# Patient Record
Sex: MALE
[De-identification: names, ages, dates, MRNs, and addresses within clinical notes are randomized per-mention and may not be internally consistent; named-entity substitution may affect disease eponyms.]

---

## 2023-03-01 ENCOUNTER — NURSE TRIAGE (OUTPATIENT)
Dept: OTHER | Facility: CLINIC | Age: 58
End: 2023-03-01

## 2023-03-01 NOTE — TELEPHONE ENCOUNTER
Location of patient:[place]    Subjective: Caller states \" that the last day in and a half. Took a whole course of antiobiotcs. Has had redness and swelling a day and a half ago. And it is creeping up his leg\"     Current Symptoms: leg swelling    Onset: 1 day ago; worsening    Associated Symptoms:  redness and pain     Pain Severity: 7/10; burning; constant    Temperature: 100.0 by ear thermometer    What has been tried: unknown     Recommended disposition: Go to ED Now    Care advice provided, patient verbalizes understanding; denies any other questions or concerns; instructed to call back for any new or worsening symptoms. Patient/caller agrees to proceed to nearest Emergency Department    This triage is a result of a call to [address] [place] [address]. Please do not respond to the triage nurse through this encounter. Any subsequent communication should be directly with the patient.     Reason for Disposition   [1] Red area or streak AND [2] fever    Protocols used: Leg Swelling and Edema-ADULT-

## 2023-03-04 ENCOUNTER — TELEPHONE (OUTPATIENT)
Dept: OTHER | Facility: CLINIC | Age: 58
End: 2023-03-04

## 2023-03-07 ENCOUNTER — PATIENT OUTREACH (OUTPATIENT)
Dept: CARE COORDINATION | Facility: CLINIC | Age: 58
End: 2023-03-07
Payer: COMMERCIAL

## 2023-03-17 LAB
ALANINE AMINOTRANSFERASE (SGPT) (U/L) IN SER/PLAS: 22 U/L (ref 10–52)
ALBUMIN (G/DL) IN SER/PLAS: 4.2 G/DL (ref 3.4–5)
ALKALINE PHOSPHATASE (U/L) IN SER/PLAS: 66 U/L (ref 33–120)
ANION GAP IN SER/PLAS: 17 MMOL/L (ref 10–20)
ASPARTATE AMINOTRANSFERASE (SGOT) (U/L) IN SER/PLAS: 14 U/L (ref 9–39)
BILIRUBIN TOTAL (MG/DL) IN SER/PLAS: 0.7 MG/DL (ref 0–1.2)
CALCIUM (MG/DL) IN SER/PLAS: 10.1 MG/DL (ref 8.6–10.6)
CARBON DIOXIDE, TOTAL (MMOL/L) IN SER/PLAS: 28 MMOL/L (ref 21–32)
CHLORIDE (MMOL/L) IN SER/PLAS: 104 MMOL/L (ref 98–107)
CHOLESTEROL (MG/DL) IN SER/PLAS: 137 MG/DL (ref 0–199)
CHOLESTEROL IN HDL (MG/DL) IN SER/PLAS: 52.2 MG/DL
CHOLESTEROL/HDL RATIO: 2.6
CORTISOL (UG/DL) IN SERUM - AM: 18.4 UG/DL (ref 5–20)
CREATININE (MG/DL) IN SER/PLAS: 0.93 MG/DL (ref 0.5–1.3)
DEHYDROEPIANDROSTERONE SULFATE (DHEA-S) (UG/DL) IN SER/: 161 UG/DL (ref 70–310)
ESTIMATED AVERAGE GLUCOSE FOR HBA1C: 192 MG/DL
FOLLITROPIN (IU/L) IN SER/PLAS: 7.1 IU/L
GFR MALE: >90 ML/MIN/1.73M2
GLUCOSE (MG/DL) IN SER/PLAS: 192 MG/DL (ref 74–99)
HEMOGLOBIN A1C/HEMOGLOBIN TOTAL IN BLOOD: 8.3 %
LDL: 60 MG/DL (ref 0–99)
LUTEINIZING HORMONE (IU/ML) IN SER/PLAS: 2.7 IU/L
POTASSIUM (MMOL/L) IN SER/PLAS: 4.7 MMOL/L (ref 3.5–5.3)
PROLACTIN (UG/L) IN SER/PLAS: 4.8 UG/L (ref 2–18)
PROTEIN TOTAL: 7.9 G/DL (ref 6.4–8.2)
SODIUM (MMOL/L) IN SER/PLAS: 144 MMOL/L (ref 136–145)
THYROTROPIN (MIU/L) IN SER/PLAS BY DETECTION LIMIT <= 0.05 MIU/L: 5.04 MIU/L (ref 0.44–3.98)
THYROXINE (T4) FREE (NG/DL) IN SER/PLAS: 1.23 NG/DL (ref 0.78–1.48)
TRIGLYCERIDE (MG/DL) IN SER/PLAS: 123 MG/DL (ref 0–149)
TRIIODOTHYRONINE (T3) (NG/DL) IN SER/PLAS: 125 NG/DL (ref 60–200)
UREA NITROGEN (MG/DL) IN SER/PLAS: 22 MG/DL (ref 6–23)
VLDL: 25 MG/DL (ref 0–40)

## 2023-03-20 LAB — ADRENOCORTICOTROPIC HORMONE: 37.3 PG/ML (ref 7.2–63.3)

## 2023-03-23 LAB
TESTOSTERONE FREE (CHAN): 48.2 PG/ML (ref 35–155)
TESTOSTERONE,TOTAL,LC-MS/MS: 215 NG/DL (ref 250–1100)

## 2023-07-21 LAB
ESTRADIOL (PG/ML) IN SER/PLAS: 88 PG/ML
HEMATOCRIT (%) IN BLOOD BY AUTOMATED COUNT: 53.8 % (ref 41–52)
LUTEINIZING HORMONE (IU/ML) IN SER/PLAS: <0.1 IU/L
PROSTATE SPECIFIC AG (NG/ML) IN SER/PLAS: 0.79 NG/ML (ref 0–4)
TESTOSTERONE (NG/DL) IN SER/PLAS: 733 NG/DL (ref 240–1000)

## 2023-07-22 ENCOUNTER — NURSE TRIAGE (OUTPATIENT)
Dept: OTHER | Facility: CLINIC | Age: 58
End: 2023-07-22

## 2023-07-31 ENCOUNTER — PATIENT OUTREACH (OUTPATIENT)
Dept: CARE COORDINATION | Facility: CLINIC | Age: 58
End: 2023-07-31
Payer: COMMERCIAL

## 2023-07-31 NOTE — PROGRESS NOTES
Admitted to [facility] 7/23/23 to 7/28/23 with osteomyelitis eft 2nd digit; status post amputation of such.   Discharged home on Keflex.     PMH T2DM ( 8.3 3/20/23)  HTN HLD.  Existing appt with endocrinology 9/19.   Follow up with podiatry in 1 week     Outreach to patient for transition of care completion; left voice mail message.  Enrolled in [employer] chat to monitor for 30 day transition period and will outreach if issues arise.    Gricelda VARGAS RN CCM  RN Care Coordinator  [facility]  Phone [phone]

## 2023-08-07 ENCOUNTER — PATIENT OUTREACH (OUTPATIENT)
Dept: CARE COORDINATION | Facility: CLINIC | Age: 58
End: 2023-08-07
Payer: COMMERCIAL

## 2023-08-07 NOTE — PROGRESS NOTES
Hospital provider appointment follow up.   No appointment or post hospital progress notes imaged in chart.   Attempted outreach to patient; left voice mail message. Will continue to monitor for 30 day transition period and outreach if issues arise.    Gricelda VARGAS RN CCM  RN Care Coordinator  [facility]  Phone [phone]

## 2023-09-28 PROBLEM — D22.39 MELANOCYTIC NEVI OF OTHER PARTS OF FACE: Status: ACTIVE | Noted: 2022-08-24

## 2023-09-28 PROBLEM — L72.9 INFECTED CYST OF SKIN: Status: ACTIVE | Noted: 2023-09-28

## 2023-09-28 PROBLEM — R53.83 MALAISE AND FATIGUE: Status: ACTIVE | Noted: 2023-09-28

## 2023-09-28 PROBLEM — D48.5 NEOPLASM OF UNCERTAIN BEHAVIOR OF SKIN: Status: ACTIVE | Noted: 2022-08-24

## 2023-09-28 PROBLEM — M79.89 SWELLING OF TOE OF BOTH FEET: Status: ACTIVE | Noted: 2023-09-28

## 2023-09-28 PROBLEM — E11.9 DIABETES MELLITUS (MULTI): Status: ACTIVE | Noted: 2023-09-28

## 2023-09-28 PROBLEM — D22.30 MELANOCYTIC NEVI OF UNSPECIFIED PART OF FACE: Status: ACTIVE | Noted: 2022-08-24

## 2023-09-28 PROBLEM — D22.5 MELANOCYTIC NEVI OF TRUNK: Status: ACTIVE | Noted: 2022-08-24

## 2023-09-28 PROBLEM — F90.9 ATTENTION-DEFICIT/HYPERACTIVITY DISORDER: Status: ACTIVE | Noted: 2023-09-28

## 2023-09-28 PROBLEM — D22.60 MELANOCYTIC NEVI OF UNSPECIFIED UPPER LIMB, INCLUDING SHOULDER: Status: ACTIVE | Noted: 2022-08-24

## 2023-09-28 PROBLEM — L90.5 SCAR CONDITION AND FIBROSIS OF SKIN: Status: ACTIVE | Noted: 2022-08-24

## 2023-09-28 PROBLEM — R91.8 ABNORMAL CT SCAN, LUNG: Status: ACTIVE | Noted: 2023-09-28

## 2023-09-28 PROBLEM — S91.109A WOUND, OPEN, TOE: Status: ACTIVE | Noted: 2023-09-28

## 2023-09-28 PROBLEM — E11.40 DIABETIC NEUROPATHY (MULTI): Status: ACTIVE | Noted: 2023-09-28

## 2023-09-28 PROBLEM — R07.89 CHEST DISCOMFORT: Status: ACTIVE | Noted: 2023-09-28

## 2023-09-28 PROBLEM — I10 BENIGN ESSENTIAL HYPERTENSION: Status: ACTIVE | Noted: 2023-09-28

## 2023-09-28 PROBLEM — M79.675 PAIN AROUND TOENAIL, LEFT FOOT: Status: ACTIVE | Noted: 2023-09-28

## 2023-09-28 PROBLEM — Z04.9 CONDITION NOT FOUND: Status: ACTIVE | Noted: 2023-09-28

## 2023-09-28 PROBLEM — R63.4 WEIGHT LOSS: Status: ACTIVE | Noted: 2023-09-28

## 2023-09-28 PROBLEM — N28.9 RENAL INSUFFICIENCY: Status: ACTIVE | Noted: 2023-09-28

## 2023-09-28 PROBLEM — L03.116 CELLULITIS OF LEFT LEG: Status: ACTIVE | Noted: 2023-09-28

## 2023-09-28 PROBLEM — D22.71 MELANOCYTIC NEVI OF RIGHT LOWER LIMB, INCLUDING HIP: Status: ACTIVE | Noted: 2022-08-24

## 2023-09-28 PROBLEM — R80.9 PROTEINURIA: Status: ACTIVE | Noted: 2023-09-28

## 2023-09-28 PROBLEM — M17.11 TRICOMPARTMENT OSTEOARTHRITIS OF RIGHT KNEE: Status: ACTIVE | Noted: 2023-09-28

## 2023-09-28 PROBLEM — N20.0 KIDNEY STONE ON RIGHT SIDE: Status: ACTIVE | Noted: 2023-09-28

## 2023-09-28 PROBLEM — R79.9 ABNORMAL BLOOD CHEMISTRY: Status: ACTIVE | Noted: 2023-09-28

## 2023-09-28 PROBLEM — D51.0 ANEMIA, PERNICIOUS: Status: ACTIVE | Noted: 2023-09-28

## 2023-09-28 PROBLEM — L03.90 CELLULITIS: Status: ACTIVE | Noted: 2023-09-28

## 2023-09-28 PROBLEM — D22.4 MELANOCYTIC NEVI OF SCALP AND NECK: Status: ACTIVE | Noted: 2022-08-24

## 2023-09-28 PROBLEM — M79.10 MYALGIA: Status: ACTIVE | Noted: 2023-09-28

## 2023-09-28 PROBLEM — R91.8 PULMONARY NODULES: Status: ACTIVE | Noted: 2023-09-28

## 2023-09-28 PROBLEM — D18.01 HEMANGIOMA OF SKIN AND SUBCUTANEOUS TISSUE: Status: ACTIVE | Noted: 2022-08-24

## 2023-09-28 PROBLEM — K76.9 LIVER LESION: Status: ACTIVE | Noted: 2023-09-28

## 2023-09-28 PROBLEM — E55.9 VITAMIN D DEFICIENCY: Status: ACTIVE | Noted: 2023-09-28

## 2023-09-28 PROBLEM — M54.12 RIGHT CERVICAL RADICULOPATHY: Status: ACTIVE | Noted: 2023-09-28

## 2023-09-28 PROBLEM — N52.9 ERECTILE DYSFUNCTION: Status: ACTIVE | Noted: 2023-09-28

## 2023-09-28 PROBLEM — E78.6 LOW HDL (UNDER 40): Status: ACTIVE | Noted: 2023-09-28

## 2023-09-28 PROBLEM — D64.9 ANEMIA: Status: ACTIVE | Noted: 2023-09-28

## 2023-09-28 PROBLEM — R43.0 ANOSMIA: Status: ACTIVE | Noted: 2023-09-28

## 2023-09-28 PROBLEM — B35.1 ONYCHOMYCOSIS: Status: ACTIVE | Noted: 2023-09-28

## 2023-09-28 PROBLEM — L08.9 INFECTED CYST OF SKIN: Status: ACTIVE | Noted: 2023-09-28

## 2023-09-28 PROBLEM — L08.9 TOE INFECTION: Status: ACTIVE | Noted: 2023-09-28

## 2023-09-28 PROBLEM — R53.81 MALAISE AND FATIGUE: Status: ACTIVE | Noted: 2023-09-28

## 2023-09-28 RX ORDER — METFORMIN HYDROCHLORIDE 1000 MG/1
1000 TABLET ORAL 2 TIMES DAILY
COMMUNITY
End: 2023-10-18 | Stop reason: ALTCHOICE

## 2023-09-28 RX ORDER — SULFAMETHOXAZOLE AND TRIMETHOPRIM 800; 160 MG/1; MG/1
1 TABLET ORAL EVERY 12 HOURS
COMMUNITY
Start: 2022-08-30 | End: 2024-02-16 | Stop reason: ALTCHOICE

## 2023-09-28 RX ORDER — FLASH GLUCOSE SENSOR
KIT MISCELLANEOUS
COMMUNITY
Start: 2022-12-27 | End: 2024-01-16 | Stop reason: SDUPTHER

## 2023-09-28 RX ORDER — PEN NEEDLE, DIABETIC 32GX 5/32"
NEEDLE, DISPOSABLE MISCELLANEOUS 4 TIMES DAILY
COMMUNITY
Start: 2022-12-10 | End: 2024-01-16 | Stop reason: SDUPTHER

## 2023-09-28 RX ORDER — DULAGLUTIDE 4.5 MG/.5ML
4.5 INJECTION, SOLUTION SUBCUTANEOUS
COMMUNITY
Start: 2019-02-21 | End: 2023-10-18 | Stop reason: ALTCHOICE

## 2023-09-28 RX ORDER — EMPAGLIFLOZIN 25 MG/1
TABLET, FILM COATED ORAL
COMMUNITY
Start: 2023-07-25 | End: 2024-01-16 | Stop reason: SDUPTHER

## 2023-09-28 RX ORDER — FENOFIBRATE 48 MG/1
1 TABLET, FILM COATED ORAL DAILY
COMMUNITY
Start: 2019-06-27 | End: 2023-10-17

## 2023-09-28 RX ORDER — CHLORTHALIDONE 25 MG/1
25 TABLET ORAL DAILY
COMMUNITY
Start: 2023-07-28 | End: 2024-02-16 | Stop reason: ALTCHOICE

## 2023-09-28 RX ORDER — FUROSEMIDE 40 MG/1
40 TABLET ORAL DAILY
COMMUNITY
Start: 2023-05-17

## 2023-09-28 RX ORDER — DOXYCYCLINE 100 MG/1
1 CAPSULE ORAL 2 TIMES DAILY
COMMUNITY
Start: 2023-03-03 | End: 2024-02-16 | Stop reason: ALTCHOICE

## 2023-09-28 RX ORDER — SILDENAFIL 50 MG/1
50 TABLET, FILM COATED ORAL AS NEEDED
COMMUNITY
Start: 2019-07-08 | End: 2023-10-03

## 2023-09-28 RX ORDER — INSULIN LISPRO 100 [IU]/ML
INJECTION, SOLUTION INTRAVENOUS; SUBCUTANEOUS
COMMUNITY
Start: 2021-01-15 | End: 2024-01-16 | Stop reason: SDUPTHER

## 2023-09-28 RX ORDER — BLOOD-GLUCOSE METER
EACH MISCELLANEOUS 3 TIMES DAILY
COMMUNITY
End: 2024-05-02 | Stop reason: WASHOUT

## 2023-09-28 RX ORDER — ERGOCALCIFEROL 1.25 MG/1
1 CAPSULE ORAL
COMMUNITY
End: 2023-11-17

## 2023-09-28 RX ORDER — FUROSEMIDE 20 MG/1
20 TABLET ORAL DAILY PRN
COMMUNITY
Start: 2023-03-09

## 2023-09-28 RX ORDER — INSULIN GLARGINE 300 U/ML
12 INJECTION, SOLUTION SUBCUTANEOUS NIGHTLY PRN
COMMUNITY
Start: 2022-07-15 | End: 2023-10-03

## 2023-09-28 RX ORDER — PRAVASTATIN SODIUM 40 MG/1
1 TABLET ORAL DAILY
COMMUNITY
End: 2024-01-16 | Stop reason: WASHOUT

## 2023-09-28 RX ORDER — LOSARTAN POTASSIUM 100 MG/1
1 TABLET ORAL DAILY
COMMUNITY
Start: 2019-06-27 | End: 2023-10-17

## 2023-09-28 RX ORDER — POTASSIUM CHLORIDE 1500 MG/1
20 TABLET, EXTENDED RELEASE ORAL DAILY
COMMUNITY
Start: 2023-03-09

## 2023-09-28 RX ORDER — CYANOCOBALAMIN 1000 UG/ML
2 INJECTION, SOLUTION INTRAMUSCULAR; SUBCUTANEOUS
COMMUNITY
Start: 2022-01-19

## 2023-09-28 RX ORDER — INSULIN ASPART 100 [IU]/ML
INJECTION, SOLUTION INTRAVENOUS; SUBCUTANEOUS
COMMUNITY
Start: 2023-06-04 | End: 2023-11-30

## 2023-09-28 RX ORDER — TESTOSTERONE CYPIONATE 200 MG/ML
0.4 INJECTION, SOLUTION INTRAMUSCULAR
COMMUNITY
Start: 2023-06-28 | End: 2024-02-19

## 2023-09-28 RX ORDER — AMLODIPINE BESYLATE 10 MG/1
1 TABLET ORAL DAILY
COMMUNITY
Start: 2018-10-15 | End: 2023-12-26

## 2023-09-28 RX ORDER — ACETAMINOPHEN 325 MG/1
2 TABLET ORAL EVERY 4 HOURS PRN
COMMUNITY
Start: 2022-11-10

## 2023-09-28 RX ORDER — PRAVASTATIN SODIUM 20 MG/1
1 TABLET ORAL NIGHTLY
COMMUNITY
Start: 2019-06-27 | End: 2023-10-13

## 2023-09-28 RX ORDER — NIRMATRELVIR AND RITONAVIR 300-100 MG
KIT ORAL
COMMUNITY
Start: 2023-04-11 | End: 2024-02-16 | Stop reason: ALTCHOICE

## 2023-09-28 RX ORDER — OXYCODONE HYDROCHLORIDE 5 MG/1
5 TABLET ORAL EVERY 8 HOURS PRN
COMMUNITY
Start: 2023-07-28 | End: 2024-02-16 | Stop reason: ALTCHOICE

## 2023-09-28 RX ORDER — GLIPIZIDE 5 MG/1
10 TABLET, FILM COATED, EXTENDED RELEASE ORAL 2 TIMES DAILY
COMMUNITY
Start: 2021-01-04 | End: 2023-10-18 | Stop reason: ALTCHOICE

## 2023-09-28 RX ORDER — CHOLECALCIFEROL (VITAMIN D3) 25 MCG
1 TABLET ORAL DAILY
COMMUNITY
Start: 2022-07-09

## 2023-10-02 DIAGNOSIS — N52.9 ERECTILE DISORDER: ICD-10-CM

## 2023-10-02 DIAGNOSIS — E11.59 TYPE 2 DIABETES MELLITUS WITH OTHER CIRCULATORY COMPLICATION, UNSPECIFIED WHETHER LONG TERM INSULIN USE (MULTI): Primary | ICD-10-CM

## 2023-10-02 DIAGNOSIS — E11.9 TYPE 2 DIABETES MELLITUS WITHOUT COMPLICATION, WITH LONG-TERM CURRENT USE OF INSULIN (MULTI): Primary | ICD-10-CM

## 2023-10-02 DIAGNOSIS — Z79.4 TYPE 2 DIABETES MELLITUS WITHOUT COMPLICATION, WITH LONG-TERM CURRENT USE OF INSULIN (MULTI): Primary | ICD-10-CM

## 2023-10-03 RX ORDER — SILDENAFIL 50 MG/1
TABLET, FILM COATED ORAL
Qty: 30 TABLET | Refills: 0 | Status: SHIPPED | OUTPATIENT
Start: 2023-10-03 | End: 2023-12-26

## 2023-10-03 RX ORDER — INSULIN GLARGINE 300 U/ML
INJECTION, SOLUTION SUBCUTANEOUS
Qty: 13.5 ML | Refills: 2 | Status: SHIPPED | OUTPATIENT
Start: 2023-10-03 | End: 2024-01-16 | Stop reason: SDUPTHER

## 2023-10-13 RX ORDER — PRAVASTATIN SODIUM 20 MG/1
20 TABLET ORAL NIGHTLY
Qty: 90 TABLET | Refills: 2 | Status: SHIPPED | OUTPATIENT
Start: 2023-10-13 | End: 2024-01-16 | Stop reason: SDUPTHER

## 2023-10-16 ENCOUNTER — TELEPHONE (OUTPATIENT)
Dept: ENDOCRINOLOGY | Facility: CLINIC | Age: 58
End: 2023-10-16
Payer: COMMERCIAL

## 2023-10-16 NOTE — TELEPHONE ENCOUNTER
[facility] faxed a request for renewal of 5mg of moujaro. Records show patient should be titrating to 7.5mg. I spoke with Franck and confirmed he did well with the 5mg. He will be starting the 7.5 this Sunday and already has it on hand. He will call us if there are any issues tolerating the new dosage.     Form returnred to [facility] to let know that he was titrated and 5mg is no longer needed.

## 2023-10-17 DIAGNOSIS — I10 BENIGN ESSENTIAL HYPERTENSION: Primary | ICD-10-CM

## 2023-10-17 RX ORDER — LOSARTAN POTASSIUM 100 MG/1
100 TABLET ORAL DAILY
Qty: 90 TABLET | Refills: 0 | Status: SHIPPED | OUTPATIENT
Start: 2023-10-17 | End: 2024-01-09

## 2023-10-17 RX ORDER — FENOFIBRATE 48 MG/1
48 TABLET, FILM COATED ORAL DAILY
Qty: 90 TABLET | Refills: 0 | Status: SHIPPED | OUTPATIENT
Start: 2023-10-17 | End: 2024-01-09

## 2023-10-18 ENCOUNTER — OFFICE VISIT (OUTPATIENT)
Dept: PRIMARY CARE | Facility: CLINIC | Age: 58
End: 2023-10-18
Payer: COMMERCIAL

## 2023-10-18 VITALS — DIASTOLIC BLOOD PRESSURE: 66 MMHG | BODY MASS INDEX: 38.95 KG/M2 | WEIGHT: 315 LBS | SYSTOLIC BLOOD PRESSURE: 120 MMHG

## 2023-10-18 DIAGNOSIS — I10 HYPERTENSION, UNSPECIFIED TYPE: ICD-10-CM

## 2023-10-18 DIAGNOSIS — E11.69 TYPE 2 DIABETES MELLITUS WITH OTHER SPECIFIED COMPLICATION, WITH LONG-TERM CURRENT USE OF INSULIN (MULTI): Primary | ICD-10-CM

## 2023-10-18 DIAGNOSIS — D51.0 PERNICIOUS ANEMIA: ICD-10-CM

## 2023-10-18 DIAGNOSIS — Z79.4 TYPE 2 DIABETES MELLITUS WITH OTHER SPECIFIED COMPLICATION, WITH LONG-TERM CURRENT USE OF INSULIN (MULTI): Primary | ICD-10-CM

## 2023-10-18 DIAGNOSIS — Z23 FLU VACCINE NEED: ICD-10-CM

## 2023-10-18 DIAGNOSIS — R06.02 SHORTNESS OF BREATH: ICD-10-CM

## 2023-10-18 PROCEDURE — 3078F DIAST BP <80 MM HG: CPT | Performed by: INTERNAL MEDICINE

## 2023-10-18 PROCEDURE — 4010F ACE/ARB THERAPY RXD/TAKEN: CPT | Performed by: INTERNAL MEDICINE

## 2023-10-18 PROCEDURE — 90471 IMMUNIZATION ADMIN: CPT | Performed by: INTERNAL MEDICINE

## 2023-10-18 PROCEDURE — 99214 OFFICE O/P EST MOD 30 MIN: CPT | Performed by: INTERNAL MEDICINE

## 2023-10-18 PROCEDURE — 3074F SYST BP LT 130 MM HG: CPT | Performed by: INTERNAL MEDICINE

## 2023-10-18 PROCEDURE — 90682 RIV4 VACC RECOMBINANT DNA IM: CPT | Performed by: INTERNAL MEDICINE

## 2023-10-18 PROCEDURE — 3052F HG A1C>EQUAL 8.0%<EQUAL 9.0%: CPT | Performed by: INTERNAL MEDICINE

## 2023-10-18 PROCEDURE — 3008F BODY MASS INDEX DOCD: CPT | Performed by: INTERNAL MEDICINE

## 2023-10-18 RX ORDER — CYANOCOBALAMIN 1000 UG/ML
1000 INJECTION, SOLUTION INTRAMUSCULAR; SUBCUTANEOUS ONCE
Status: DISCONTINUED | OUTPATIENT
Start: 2023-10-18 | End: 2024-05-28

## 2023-10-18 RX ORDER — OMEGA-3-ACID ETHYL ESTERS 1 G/1
1000 CAPSULE, LIQUID FILLED ORAL 2 TIMES DAILY
COMMUNITY

## 2023-10-18 RX ORDER — REGADENOSON 0.08 MG/ML
0.4 INJECTION, SOLUTION INTRAVENOUS
Status: CANCELLED | OUTPATIENT
Start: 2023-10-18

## 2023-10-18 ASSESSMENT — PATIENT HEALTH QUESTIONNAIRE - PHQ9
1. LITTLE INTEREST OR PLEASURE IN DOING THINGS: NOT AT ALL
2. FEELING DOWN, DEPRESSED OR HOPELESS: NOT AT ALL
SUM OF ALL RESPONSES TO PHQ9 QUESTIONS 1 AND 2: 0

## 2023-10-18 NOTE — PROGRESS NOTES
Subjective   Patient ID: Franck Mckinley is a 57 y.o. male who presents for FUV and Flu Vaccine.    HPI  Patient in for a visit  S/p left 2nd toe amputation to the first MTP Dr Barksdale , had ffup last one this week but can always call to see had a regular podiatrist but did not call back  Review of Systems  General: Denies fever, chills, night sweats,  Eyes: Negative for recent visual changes  Ears, Nose, Throat :  Negative for hearing changes, sinus discomfort  Dermatologic: Negative for new skin conditions, rash  Respiratory: Negative for wheezing, shortness of breath, cough  Cardiovascular: Negative for chest pain, palpitations, or leg swelling  Gastrointestinal: Negative for nausea/vomiting, abdominal pain, changes in bowel habits  Genitourinary NEGATIVE FOR URINARY INCONTINENCE urgency , frequency, discomfort   Musculoskeletal: see hpi  did mess up his exercise routine just released to restart   Neurological: Negative for headaches, dizziness    Previous history  Past Medical History:   Diagnosis Date    Abnormal finding of blood chemistry, unspecified 06/21/2017    Abnormal blood chemistry    Acute pharyngitis, unspecified 11/11/2019    Pharyngitis    Acute pharyngitis, unspecified 08/18/2020    Sore throat    Anosmia 04/19/2021    Anosmia    Calculus of kidney 01/04/2021    Kidney stone on right side    Cellulitis, unspecified 12/21/2022    Cellulitis    Chronic sinusitis, unspecified 04/26/2018    Sinusitis    Cough, unspecified 04/19/2021    Cough    Disorder of kidney and ureter, unspecified 11/14/2022    Renal insufficiency    Dorsalgia, unspecified 12/22/2020    Back pain    Edema, unspecified 11/16/2022    Edema    Encounter for screening for malignant neoplasm of colon 02/11/2020    Colon cancer screening    Encounter for screening for malignant neoplasm of prostate 01/19/2022    Screening for prostate cancer    Encounter for screening for malignant neoplasm of prostate 05/19/2020    Encounter for  prostate cancer screening    Essential (primary) hypertension 11/16/2022    Benign essential hypertension    Fever, unspecified 04/19/2021    Fever    Follicular cyst of the skin and subcutaneous tissue, unspecified 11/11/2019    Infected cyst of skin    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    Low HDL (under 40)    Liver disease, unspecified 01/27/2021    Liver lesion    Male erectile dysfunction, unspecified 02/07/2020    Erectile dysfunction    Myalgia, unspecified site 08/18/2020    Myalgia    Other chest pain 01/19/2022    Chest discomfort    Other conditions influencing health status 01/19/2022    Skin tear    Other fatigue 03/21/2019    Fatigue    Other malaise 08/18/2020    Malaise and fatigue    Other nonspecific abnormal finding of lung field 02/10/2021    Abnormal CT scan, lung    Pain in right knee 06/26/2020    Right knee pain    Pain in right shoulder 03/30/2022    Right shoulder pain    Proteinuria, unspecified 11/14/2022    Proteinuria    Proteinuria, unspecified 11/13/2015    Proteinuria    Type 2 diabetes mellitus without complications (CMS/HCC) 12/22/2022    Diabetes mellitus    Unspecified abdominal pain 05/26/2022    Abdominal pain    Unspecified open wound of unspecified toe(s) without damage to nail, initial encounter 03/16/2020    Wound, open, toe     Past Surgical History:   Procedure Laterality Date    HAND SURGERY  04/04/2014    Hand Surgery                                                                                                                                                          KNEE ARTHROPLASTY  04/04/2014    Knee Arthroplasty    OTHER SURGICAL HISTORY  06/17/2020    Colonoscopy     Social History     Tobacco Use    Smoking status: Former     Types: Cigarettes    Smokeless tobacco: Never   Substance Use Topics    Alcohol use: Not Currently     Family History   Problem Relation Name Age of Onset    Kidney cancer Mother       No Known Allergies  Current Outpatient Medications  "  Medication Instructions    acetaminophen (Tylenol) 325 mg tablet 2 tablets, oral, Every 4 hours PRN    amLODIPine (Norvasc) 10 mg tablet 1 tablet, oral, Daily    BD Romana 2nd Gen Pen Needle 32 gauge x 5/32\" needle 4 times daily    chlorthalidone (HYGROTON) 25 mg, oral, Daily    cholecalciferol (Vitamin D-3) 25 MCG (1000 UT) tablet 1 tablet, oral, Daily    cyanocobalamin (Vitamin B-12) 1,000 mcg/mL injection 2 mL, intramuscular, per AQ.    doxycycline (Vibramycin) 100 mg capsule 1 capsule, oral, 2 times daily    ergocalciferol (Vitamin D-2) 1.25 MG (46759 UT) capsule 1 capsule, oral, Weekly    fenofibrate (TRICOR) 48 mg, oral, Daily, as directed    FreeStyle Roverto 2 Sensor kit Every 14 days    furosemide (LASIX) 20 mg, oral, Daily PRN    furosemide (LASIX) 40 mg, oral, Daily, 3 DAYS FOR EDEMA THEN HOLD AND IF NEEDED    glipiZIDE XL (GLUCOTROL XL) 10 mg, oral, 2 times daily    insulin lispro (HumaLOG KwikPen Insulin) 100 unit/mL injection subcutaneous, 3 times daily with meals, INJECT up to 6 UNITS 3 TIMES A DAY WITH MEALS AND PER SLIDING SCALE 55 UNITS MAX DAILY<BR>    Jardiance 25 mg     losartan (COZAAR) 100 mg, oral, Daily    metFORMIN (GLUCOPHAGE) 1,000 mg, oral, 2 times daily    NovoLOG FlexPen U-100 Insulin 100 unit/mL (3 mL) pen     omega-3 acid ethyl esters (LOVAZA) 1,000 g, oral, 2 times daily    OneTouch Verio test strips strip 3 times daily    oxyCODONE (ROXICODONE) 5 mg, oral, Every 8 hours PRN    Paxlovid 300 mg (150 mg x 2)-100 mg tablet therapy pack PLEASE SEE ATTACHED FOR DETAILED DIRECTIONS    potassium chloride CR 20 mEq ER tablet 20 mEq, oral, Daily, WHILE ON FUROSEMIDE ONLY<BR>    pravastatin (Pravachol) 40 mg tablet 1 tablet, oral, Daily    pravastatin (PRAVACHOL) 20 mg, oral, Nightly    sildenafil (Viagra) 50 mg tablet TAKE 1 TABLET DAILY 1 HOUR BEFORE NEEDED    sulfamethoxazole-trimethoprim (Bactrim DS) 800-160 mg tablet 1 tablet, oral, Every 12 hours    testosterone cypionate " (Depo-Testosterone) 200 mg/mL injection 0.4 mL, intramuscular, Weekly    Toujeo SoloStar U-300 Insulin 300 unit/mL (1.5 mL) injection INJECT 28 UNITS SUBCUTANEOUSLY AT BEDTIME, IF BLOOD GLUCOSE IS GREATER THAN 220 INCREASE TO 32 UNITS AT BEDTIME    Trulicity 4.5 mg, subcutaneous, Weekly, on sunday       Objective       Physical Exam  Vital Signs: as recorded above  General: Well groomed, well nourished   Orientation:  Alert , oriented to time, place , and person   Mood and Affect:  Cooperative , no apparent distress normal affect  Skin: Good color, good turgor  2nd toe clean nontender   Eyes: Extra ocular muscle movements intact, anicteric sclerae  Neck: Supple, full range of movement  Chest: Normal breath sounds, normal chest wall exam, symmetric, good air entry, clear to auscultation  Heart: Regular rate and rhythm, without murmur, gallop, or rubs  BACK:  no CTLS spine tenderness, no flank tenderness  Extremities: full range of movement  bilateral UE and bilateral LE,  no lower extremity edema  Neurological: Alert, oriented, cranial nerves II-XII intact except for visual acuity  Sensation:  Intact   Gait: normal steady      Assessment/Plan   Franck Mckinley is a 57 y.o. male who presents for the concerns below:    Problem List Items Addressed This Visit    None    Pernicious anemia [id number] today for his height and weight adjusted 2 shots     FLU SHOT VO4.8  No allergies, no previous reaction or severe reaction within 6 weeks of receiving flu shot in the past , no fever, myalgia.  Receiving Flu shot today    HYPERTENSION PLAN:  , taking blood pressure medication  Follow low salt diet, exercise as discussed, weight control. Continue current medications  Eye exam in December     DIABETES MELLITUS PLAN:Stable seeing Dr Domínguez  with current medication adjustment regimen now on Mounjaro going up next week last Hba1c 8.1  Follow Diabetic diet, will consult nutrition if needed,  exercise as discussed, weight control.,  before our next visit will obtain HbA1c, BMP, urine microalbumin, ophthalmology exam.  Need Podiatry checks periodically.     SHORTNESS OF BREATH still with intermittent , he will restart stationary biking but we had planned to do a nuclear stress, cannot walk s/p aputation toe due to diabetes.  Order is in    Discussed with:   Return in :    Portions of this note were generated using digital voice recognition software, and may contain grammatical errors       Piper Alvarado MD  10/18/23  9:37 AM

## 2023-11-03 ENCOUNTER — HOSPITAL ENCOUNTER (OUTPATIENT)
Dept: RADIOLOGY | Facility: HOSPITAL | Age: 58
Discharge: HOME | End: 2023-11-03
Payer: COMMERCIAL

## 2023-11-03 ENCOUNTER — HOSPITAL ENCOUNTER (OUTPATIENT)
Dept: CARDIOLOGY | Facility: HOSPITAL | Age: 58
Discharge: HOME | End: 2023-11-03
Payer: COMMERCIAL

## 2023-11-03 DIAGNOSIS — Z79.4 TYPE 2 DIABETES MELLITUS WITH OTHER SPECIFIED COMPLICATION, WITH LONG-TERM CURRENT USE OF INSULIN (MULTI): ICD-10-CM

## 2023-11-03 DIAGNOSIS — R06.02 SHORTNESS OF BREATH: ICD-10-CM

## 2023-11-03 DIAGNOSIS — I10 HYPERTENSION, UNSPECIFIED TYPE: ICD-10-CM

## 2023-11-03 DIAGNOSIS — E11.69 TYPE 2 DIABETES MELLITUS WITH OTHER SPECIFIED COMPLICATION, WITH LONG-TERM CURRENT USE OF INSULIN (MULTI): ICD-10-CM

## 2023-11-03 PROCEDURE — 93016 CV STRESS TEST SUPVJ ONLY: CPT | Performed by: INTERNAL MEDICINE

## 2023-11-03 PROCEDURE — 3430000001 HC RX 343 DIAGNOSTIC RADIOPHARMACEUTICALS: Performed by: INTERNAL MEDICINE

## 2023-11-03 PROCEDURE — 2500000004 HC RX 250 GENERAL PHARMACY W/ HCPCS (ALT 636 FOR OP/ED): Performed by: INTERNAL MEDICINE

## 2023-11-03 PROCEDURE — 93018 CV STRESS TEST I&R ONLY: CPT | Performed by: NUCLEAR MEDICINE

## 2023-11-03 PROCEDURE — A9502 TC99M TETROFOSMIN: HCPCS | Performed by: INTERNAL MEDICINE

## 2023-11-03 PROCEDURE — 93017 CV STRESS TEST TRACING ONLY: CPT

## 2023-11-03 PROCEDURE — 78452 HT MUSCLE IMAGE SPECT MULT: CPT | Performed by: NUCLEAR MEDICINE

## 2023-11-03 PROCEDURE — 78452 HT MUSCLE IMAGE SPECT MULT: CPT

## 2023-11-03 PROCEDURE — 93016 CV STRESS TEST SUPVJ ONLY: CPT | Performed by: NUCLEAR MEDICINE

## 2023-11-03 RX ORDER — REGADENOSON 0.08 MG/ML
0.4 INJECTION, SOLUTION INTRAVENOUS ONCE
Status: COMPLETED | OUTPATIENT
Start: 2023-11-03 | End: 2023-11-03

## 2023-11-03 RX ADMIN — REGADENOSON 0.4 MG: 0.08 INJECTION, SOLUTION INTRAVENOUS at 10:27

## 2023-11-03 RX ADMIN — TETROFOSMIN 10.4 MILLICURIE: 0.23 INJECTION, POWDER, LYOPHILIZED, FOR SOLUTION INTRAVENOUS at 08:40

## 2023-11-03 RX ADMIN — TETROFOSMIN 32.5 MILLICURIE: 0.23 INJECTION, POWDER, LYOPHILIZED, FOR SOLUTION INTRAVENOUS at 10:40

## 2023-11-13 ENCOUNTER — APPOINTMENT (OUTPATIENT)
Dept: UROLOGY | Facility: HOSPITAL | Age: 58
End: 2023-11-13
Payer: COMMERCIAL

## 2023-11-16 DIAGNOSIS — E55.9 VITAMIN D DEFICIENCY: Primary | ICD-10-CM

## 2023-11-17 DIAGNOSIS — Z79.4 TYPE 2 DIABETES MELLITUS WITH HYPERGLYCEMIA, WITH LONG-TERM CURRENT USE OF INSULIN (MULTI): ICD-10-CM

## 2023-11-17 DIAGNOSIS — E55.9 VITAMIN D DEFICIENCY: Primary | ICD-10-CM

## 2023-11-17 DIAGNOSIS — E11.65 TYPE 2 DIABETES MELLITUS WITH HYPERGLYCEMIA, WITH LONG-TERM CURRENT USE OF INSULIN (MULTI): ICD-10-CM

## 2023-11-17 RX ORDER — ERGOCALCIFEROL 1.25 MG/1
1 CAPSULE ORAL
Qty: 4 CAPSULE | Refills: 2 | Status: SHIPPED | OUTPATIENT
Start: 2023-11-17 | End: 2024-02-05

## 2023-11-28 DIAGNOSIS — E11.9 TYPE 2 DIABETES MELLITUS WITHOUT COMPLICATION, WITHOUT LONG-TERM CURRENT USE OF INSULIN (MULTI): Primary | ICD-10-CM

## 2023-11-29 DIAGNOSIS — Z79.4 TYPE 2 DIABETES MELLITUS WITH HYPERGLYCEMIA, WITH LONG-TERM CURRENT USE OF INSULIN (MULTI): Primary | ICD-10-CM

## 2023-11-29 DIAGNOSIS — E11.65 TYPE 2 DIABETES MELLITUS WITH HYPERGLYCEMIA, WITH LONG-TERM CURRENT USE OF INSULIN (MULTI): Primary | ICD-10-CM

## 2023-11-29 RX ORDER — TIRZEPATIDE 7.5 MG/.5ML
7.5 INJECTION, SOLUTION SUBCUTANEOUS
Qty: 6 ML | Refills: 3 | Status: SHIPPED | OUTPATIENT
Start: 2023-11-29 | End: 2024-01-16 | Stop reason: ALTCHOICE

## 2023-11-29 NOTE — TELEPHONE ENCOUNTER
LOV: 3/15/2023- [place]  NOV: 1/16/2024  Received form from [facility] asking for 90 day refill for Mounjaro 7.5mg  Spoke to patient   Patient does need refill  Patient stated that Dr. Domínguez wrote last script.  Please see script pended below

## 2023-11-30 RX ORDER — INSULIN ASPART 100 [IU]/ML
INJECTION, SOLUTION INTRAVENOUS; SUBCUTANEOUS
Qty: 30 ML | Refills: 0 | Status: SHIPPED | OUTPATIENT
Start: 2023-11-30 | End: 2024-01-16 | Stop reason: WASHOUT

## 2023-12-01 ENCOUNTER — TELEPHONE (OUTPATIENT)
Dept: ENDOCRINOLOGY | Facility: CLINIC | Age: 58
End: 2023-12-01
Payer: COMMERCIAL

## 2023-12-12 ENCOUNTER — LAB (OUTPATIENT)
Dept: LAB | Facility: LAB | Age: 58
End: 2023-12-12
Payer: COMMERCIAL

## 2023-12-12 DIAGNOSIS — E11.65 TYPE 2 DIABETES MELLITUS WITH HYPERGLYCEMIA, WITH LONG-TERM CURRENT USE OF INSULIN (MULTI): ICD-10-CM

## 2023-12-12 DIAGNOSIS — E55.9 VITAMIN D DEFICIENCY: ICD-10-CM

## 2023-12-12 DIAGNOSIS — Z79.4 TYPE 2 DIABETES MELLITUS WITH HYPERGLYCEMIA, WITH LONG-TERM CURRENT USE OF INSULIN (MULTI): ICD-10-CM

## 2023-12-12 DIAGNOSIS — E21.1 SECONDARY HYPERPARATHYROIDISM, NOT ELSEWHERE CLASSIFIED (MULTI): Primary | ICD-10-CM

## 2023-12-12 LAB
25(OH)D3 SERPL-MCNC: 29 NG/ML (ref 30–100)
CHOLEST SERPL-MCNC: 130 MG/DL (ref 0–199)
CHOLESTEROL/HDL RATIO: 3.3
CREAT UR-MCNC: 66.3 MG/DL (ref 20–370)
EST. AVERAGE GLUCOSE BLD GHB EST-MCNC: 186 MG/DL
ESTRADIOL SERPL-MCNC: 43 PG/ML
HBA1C MFR BLD: 8.1 %
HCT VFR BLD AUTO: 59 % (ref 41–52)
HDLC SERPL-MCNC: 39.2 MG/DL
LDLC SERPL CALC-MCNC: 54 MG/DL
LH SERPL-ACNC: <0.1 IU/L
MICROALBUMIN UR-MCNC: 220.7 MG/L
MICROALBUMIN/CREAT UR: 332.9 UG/MG CREAT
NON HDL CHOLESTEROL: 91 MG/DL (ref 0–149)
PSA SERPL-MCNC: 0.78 NG/ML
TESTOST SERPL-MCNC: 368 NG/DL (ref 240–1000)
TRIGL SERPL-MCNC: 185 MG/DL (ref 0–149)
VLDL: 37 MG/DL (ref 0–40)

## 2023-12-12 PROCEDURE — 82306 VITAMIN D 25 HYDROXY: CPT

## 2023-12-12 PROCEDURE — 83002 ASSAY OF GONADOTROPIN (LH): CPT

## 2023-12-12 PROCEDURE — 80061 LIPID PANEL: CPT

## 2023-12-12 PROCEDURE — 82670 ASSAY OF TOTAL ESTRADIOL: CPT

## 2023-12-12 PROCEDURE — 84403 ASSAY OF TOTAL TESTOSTERONE: CPT

## 2023-12-12 PROCEDURE — 83036 HEMOGLOBIN GLYCOSYLATED A1C: CPT

## 2023-12-12 PROCEDURE — 82043 UR ALBUMIN QUANTITATIVE: CPT

## 2023-12-12 PROCEDURE — 82570 ASSAY OF URINE CREATININE: CPT

## 2023-12-12 PROCEDURE — 36415 COLL VENOUS BLD VENIPUNCTURE: CPT

## 2023-12-12 PROCEDURE — 84153 ASSAY OF PSA TOTAL: CPT

## 2023-12-12 PROCEDURE — 85014 HEMATOCRIT: CPT

## 2023-12-12 NOTE — PROGRESS NOTES
Patient had a visit with me on 9/19/2023.  Visit note not transferred into [facility].    Chief Complaint    follow up for diabetes      History of Present IllnessCGM INTERPRETATION:1  Average blood glucose: 199 mg/dl1 .    0 % of time worn spend below 70mg/dL1 .    41 % of time worn spent at target 70-180mg/dL1 .    59 % of time worn spent above 180mg/dL1 .   CGM data was used to influence glucose control treatment plan1 .    Minimum of 72 hours of data were reviewed1 .   FUV for type 2 diabetes. LV with Dr. Massey 03/2023. He states he was doing Toujeo 28 units BID and a dietician at [place] told him to do 56 units once daily in the morning and he sometimes takes 50 units in the morning depending on what his blood sugar is.     58 yo man with hx of type 2 diabetes, obesity class II, HTN, erectile dysfunction.    Dx: 2015  HbA1c: 8.3 (03/2023), 8.3% (12/2022)  Current regimen: Jardiance 25 mg QDAY, Toujeo 56 units QAM, Novolog 2:50 TIDAC (starting at 180)-only taking at bedtime, Trulicity 4.5 mg/week   Past medications: metformin, glipizide  Complications: diabetic foot ulcer, nephropathy  Comorbidities: HTN, obesity    SMBG: Roverto 2    Hypoglycemia: no    Diet:  Breakfast (7:30 am)-Wasa crackers with peanut butter and jelly, roasted salmon and veggies, black coffee, hard boiled eggs, olives  Lunch-typically doesn't eat lunch  Dinner (6-8 pm)-salad, roasted chicken or pork, beef or fish, roasted or steamed vegetables, occasional rice or noodles  Snacks-popcorn, fruit (cantaloup)  Drinks: water with Crystal Light packets, black coffee, one can diet pepsi per week    Exercise: currently not active given osteomyelitis in July, was previously trying to walk 1 mile per day but foot was bothering him        1 Amended By: Janene Domínguez; Sep 19 2023 2:43 PM EST    Review of Systems  General: no fever or chills  CV: no chest pain   Respiratory: no shortness of breath  MSK: no lower extremity edema  Neuro: no headache or  dizziness  See HPI for Endocrine ROS           Active Problems  Problems    · Abnormal CT scan, lung (793.19) (R91.8)   · Anemia (285.9) (D64.9)   · Anemia, pernicious (281.0) (D51.0)   · Attention-deficit/hyperactivity disorder (314.01) (F90.9)   · Back pain (724.5) (M54.9)   · Benign essential hypertension (401.1) (I10)   · Class 2 severe obesity with serious comorbidity and body mass index (BMI) of 37.0 to  37.9 in adult (278.01,V85.37) (E66.01,Z68.37)   · Colon cancer screening (V76.51) (Z12.11)   · Diabetes mellitus (250.00) (E11.9)   · Elevated hematocrit (282.7) (R71.8)   · Encounter for immunization (V03.89) (Z23)   · Encounter for prostate cancer screening (V76.44) (Z12.5)   · Erectile dysfunction (607.84) (N52.9)   · Hypogonadism male (257.2) (E29.1)   · Influenza vaccination given (V04.81) (Z23)   · Low HDL (under 40) (272.5) (E78.6)   · Onychomycosis (110.1) (B35.1)   · Pain around toenail, left foot (729.5) (M79.675)   · Proteinuria (791.0) (R80.9)   · Pulmonary nodules (793.19) (R91.8)   · Renal insufficiency (593.9) (N28.9)   · Screening for prostate cancer (V76.44) (Z12.5)   · Swelling of toe of both feet (729.81) (M79.89)   · Toe infection (686.9) (L08.9)   · Ulcer of foot due to diabetes (250.80,707.15) (E11.621,L97.509)   · Vitamin D deficiency (268.9) (E55.9)   · Weight loss (783.21) (R63.4)    Past Medical History  Problems    · Abnormal CT scan, lung (793.19) (R91.8)   · Back pain (724.5) (M54.9)   · Benign essential hypertension (401.1) (I10)   · Colon cancer screening (V76.51) (Z12.11)   · Diabetes mellitus (250.00) (E11.9)   · Encounter for prostate cancer screening (V76.44) (Z12.5)   · Erectile dysfunction (607.84) (N52.9)   · Low HDL (under 40) (272.5) (E78.6)   · Proteinuria (791.0) (R80.9)   · Renal insufficiency (593.9) (N28.9)   · Screening for prostate cancer (V76.44) (Z12.5)    Surgical History  Problems    · History of Colonoscopy   · June 4 , 2020 DR Celeste Fowler   · History of  "Hand Surgery   · History of Knee Arthroplasty    Family History  Mother    · Family history of    · just  89 yo   · Family history of renal failure (V18.69) (Z84.1)   · Family history of Kidney cancer, primary, with metastasis from kidney to other site  Father    · Family history of    · just  89 yo   · Family history of blood dyscrasia (V18.3) (Z83.2)   · now in his late 80s  Child    · Family history of No known health problems   · 15  5'9\",  13  ADHD  Daughter    · Family history of No known health problems   · 15  5'9\",  13  ADHD  Sister    · Family history of Overweight  Brother    · Family history of Overweight  Grandmother    · Family history of diabetes mellitus (V18.0) (Z83.3)  Maternal Aunt    · Family history of    · just  89 yo    Social History  Problems    · Exercise habits   · biking , walking   · Full-time employment   · [employer]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   ·    · Never smoker   · Rarely consumes alcohol (V49.89) (Z78.9)    Allergies  Medication    · No Known Drug Allergies  Recorded By: Lula Browne; 2014 8:27:29 AM    Current Meds    Medication Name Instruction   amLODIPine Besylate 10 MG Oral Tablet Take 1 tablet daily   BD Disp Needles 18G X 1-1/2\" For medication draw only   BD Pen Needle Romana 2nd Gen 32G X 4 MM uses 4 times a day   BD Pen Needle Romana U/F 32G X 4 MM Use 4 a day with insulin   Cephalexin 500 MG Oral Capsule TAKE 2 CAPSULE 3 times daily   Cyanocobalamin 1000 MCG/ML Injection Solution INJECT 1 ML INTRAMUSCULARLY ONCE A MONTH   Cyanocobalamin 1000 MCG/ML Injection Solution INJECT 1 ML INTRAMUSCULARLY ONCE A MONTH   Cyanocobalamin 1000 MCG/ML Injection Solution Inject 2mL Once Monthly   Fenofibrate 48 MG Oral Tablet take one tablet by mouth once a day as directed   Fish Oil 1000 MG Oral Capsule PATIENT TAKING 1 CAPSULE TWO TIMES DAILY   FreeStyle Roverto 2 Sensor Change sensor every 14 days as directed   Furosemide 40 MG Oral Tablet TAKE " "1 TABLET Daily for 3 days for edema then hold and prn   Gvoke HypoPen 2-Pack 1 MG/0.2ML Subcutaneous Solution Auto-injector Use for severe hypoglycemia as needed.   Jardiance 25 MG Oral Tablet TAKE 1 TABLET BY MOUTH ONCE DAILY   Losartan Potassium 100 MG Oral Tablet TAKE ONE TABLET BY MOUTH DAILY   Luer Lock Safety Syringes 22G X 1\" 3 ML use for IM injection   NovoLOG FlexPen 100 UNIT/ML Subcutaneous Solution Pen-injector INJECT PER SLIDING SCALE 30 UNITS MAX DAILY DOSAGE   OneTouch Delica Plus Worxya19D TEST 3 TIMES DAILY   OneTouch Verio In Vitro Strip TEST THREE TIMES DAILY   Potassium Chloride ER 20 MEQ Oral Tablet Extended Release Take 1 tablet daily po while on furosemide only   Pravastatin Sodium 20 MG Oral Tablet TAKE ONE TABLET BY MOUTH AT BEDTIME   Sildenafil Citrate 50 MG Oral Tablet TAKE 1 TABLET DAILY 1 HOUR BEFORE NEEDED   Testosterone Cypionate 200 MG/ML Intramuscular Solution INJECT 0.4 ML Weekly   Toujeo SoloStar 300 UNIT/ML Subcutaneous Solution Pen-injector INJECT 28 UNITS UNDER THE SKIN AT BEDTIME, IF BLOOD GLUCOSE IS >220 INCREASE TO 32 UNITS AT BEDTIME   Trulicity 4.5 MG/0.5ML Subcutaneous Solution Pen-injector Inject 4.5 mg, once a week   Vitamin D (Ergocalciferol) 1.25 MG (00382 UT) Oral Capsule TAKE 1 CAPSULE BY MOUTH ONE TIME PER WEEK   Vitamin D (Ergocalciferol) 1.25 MG (11199 UT) Oral Capsule TAKE 1 CAPSULE BY MOUTH ONE TIME PER WEEK     Vitals  Vital Signs    Recorded: [id number] 12:11PM   Temperature 97.2 F   Heart Rate 79   Respiration 16   Systolic 148   Diastolic 95   Weight 367 lb 1 oz   BMI Calculated 38.85 kg/m2   BSA Calculated 3.02   Tobacco Use b) No   Falls Screening (Age 18+) a) No falls within the last year   Pain Scale 0     Physical Exam  General: not in acute distress  Eyes: JOVAN, EOMI, no thyroid eye signs  Neck: no goiter  Neuro: alert and oriented x 3    Provider Impressions    58 yo man with hx of type 2 diabetes, obesity class II, HTN, erectile dysfunction, here for " management of diabetes.    1. Type 2 diabetes mellitus  HbA1c: 8.3 (03/2023), 8.3% (12/2022)  Current regimen: Jardiance 25 mg QDAY, Toujeo 56 units QAM, Novolog 2:50 TIDAC (starting at 180)-onsly taking at bedtime, Trulicity 4.5 mg/week   Eye exam: due, goes to a [facility]  Urine microalbumin: 52 ug/mg (01/2023) on SGLT2i since 03/2023  Podiatry: [facility], Dr. Kirill Barksdale, last seen 9/18/23  Lipids: HDL 52, LDL 60,  (03/2023) on fenofibrate    A1c: 8.1%  Roverto download reviewed. TIR 41%, no hypoglcyemia. Post-prandial hyperglycemia.   Not taking novolog before meals on a sliding scale. Only taking at bedtime.  Takes Toujeo 50 to 56 units in the morning based on blood sugars.    Discussed appropriate way to take Novolog. Will start him on scheduled prandial doses with sliding scale.  Reviewed his Roverto download with him and discussed how Novolog helps to improve post-prandial hyperglycemia by taking it before meals. He eats breakfast and dinner so will start with BIDAC novolog.    Also discussed switching Trulicity to Mounjaro. He is interested and agreeable to this.  Advised patient to notify me if he has BGs < 80.    PLAN:  -Stop Trulicity  -Start Mounjaro 5mg/week, increase to 7.5 mg/week after 4 weeks  -Continue Toujeo 56 units QAM   -Start Novolog 8 units BIDAC + SS 2:50  -continue Jardiance  -check blood sugars before every meal and bedtime  -check A1c, lipids, and urine microalbumin before next visit    Follow-up in 3 months (labs prior)      Patient Discussion/Summary    1. Start Mounjaro 5 mg, once a week. After 4 weeks, if tolerating, increase to 7.5 mg, once a week    2. Stop Trulicity    3. Continue Toujeo 56 units every morning    4. Take Novolog 8 units before breakfast and dinner (ideally about 10-15 mins before the meal). Use the sliding scale below if your pre-meal blood sugar is > 150.    <Correctional or Sliding Scale>  Only use with your meal  time insulin.    Blood Sugar Additional insulin to take  < 150 0 units   150-200 2 units   201-250 4 units   251-300 6 units   301-350 8 units   351-400 10 units   401 or greater 12 units       5. Continue Jardiance as you are       Time    Prep time on date of the patient encounter: 5 minutes.   Time spent directly with patient/family/caregiver: 30 minutes.   Documentation time: 5 minutes.   Total time on date of patient encounter: 40 minutes.      Signatures  Electronically signed by : Janene Domínguez MD; Sep 19 2023  2:43PM EST (Author)

## 2023-12-18 ENCOUNTER — OFFICE VISIT (OUTPATIENT)
Dept: UROLOGY | Facility: HOSPITAL | Age: 58
End: 2023-12-18
Payer: COMMERCIAL

## 2023-12-18 DIAGNOSIS — D75.1 POLYCYTHEMIA: ICD-10-CM

## 2023-12-18 DIAGNOSIS — N52.9 ERECTILE DYSFUNCTION, UNSPECIFIED ERECTILE DYSFUNCTION TYPE: Primary | ICD-10-CM

## 2023-12-18 DIAGNOSIS — E29.1 HYPOGONADISM IN MALE: ICD-10-CM

## 2023-12-18 DIAGNOSIS — Z12.5 PROSTATE CANCER SCREENING: ICD-10-CM

## 2023-12-18 DIAGNOSIS — R79.89 LOW TESTOSTERONE IN MALE: ICD-10-CM

## 2023-12-18 PROCEDURE — 1036F TOBACCO NON-USER: CPT | Performed by: UROLOGY

## 2023-12-18 PROCEDURE — 3060F POS MICROALBUMINURIA REV: CPT | Performed by: UROLOGY

## 2023-12-18 PROCEDURE — 4010F ACE/ARB THERAPY RXD/TAKEN: CPT | Performed by: UROLOGY

## 2023-12-18 PROCEDURE — 99214 OFFICE O/P EST MOD 30 MIN: CPT | Performed by: UROLOGY

## 2023-12-18 PROCEDURE — 3008F BODY MASS INDEX DOCD: CPT | Performed by: UROLOGY

## 2023-12-18 PROCEDURE — 3052F HG A1C>EQUAL 8.0%<EQUAL 9.0%: CPT | Performed by: UROLOGY

## 2023-12-18 PROCEDURE — 3048F LDL-C <100 MG/DL: CPT | Performed by: UROLOGY

## 2023-12-18 NOTE — PROGRESS NOTES
"HPI  58 y.o.  male patient here for concern for hypogonadism.      Last visit 08/08/23:   -Testeosterone TC.4 cc weekly, refilled today.   -injection teaching done today.   -Continue sildenafil for now.   -Patient will continue to monitor erections while on testosterone.   -PSA.   -Not interested in fertility preservation, done having children.   -fu in 3 months with low T labs.      Today's visit:   #Hypogonadism   -Currently using TC .4 cc weekly --> working well while on, has drive and energy  -noticed decline in energy when off      -Interested in fertility preservation: No.     HISTORY:   -Previous gynecomastia: none   -Personal or family history of prostate cancer: none   -Previous use of testosterone or anabolic steroids: none  -Mother had kidney cancer, grandmother had diabetes.   -No history of heart attack or stroke.     #Erectile Dysfunction  -Sildenafil 50mg --> working well  -will continue      in the 1990's, now works as  at [employer].       Lab Results   Component Value Date    TESTOSTERONE 368 12/12/2023    TESTOSTERONE 733 07/21/2023     Lab Results   Component Value Date    LH <0.1 12/12/2023     Lab Results   Component Value Date    ESTRADIOL 43 12/12/2023     Lab Results   Component Value Date    PSA 0.78 12/12/2023     Lab Results   Component Value Date    HGBA1C 8.1 (H) 12/12/2023     Lab Results   Component Value Date    HCT 59.0 (H) 12/12/2023       Current Medications:  Current Outpatient Medications   Medication Sig Dispense Refill    acetaminophen (Tylenol) 325 mg tablet Take 2 tablets (650 mg) by mouth every 4 hours if needed (pain).      amLODIPine (Norvasc) 10 mg tablet Take 1 tablet (10 mg) by mouth once daily.      BD Romana 2nd Gen Pen Needle 32 gauge x 5/32\" needle 4 times a day.      chlorthalidone (Hygroton) 25 mg tablet Take 1 tablet (25 mg) by mouth once daily.      cholecalciferol (Vitamin D-3) 25 MCG (1000 UT) tablet Take 1 tablet (25 mcg) by " mouth once daily.      cyanocobalamin (Vitamin B-12) 1,000 mcg/mL injection Inject 2 mL (2,000 mcg) into the muscle. per AQ.      doxycycline (Vibramycin) 100 mg capsule Take 1 capsule (100 mg) by mouth 2 times a day.      ergocalciferol (Vitamin D-2) 1.25 MG (88871 UT) capsule TAKE 1 CAPSULE BY MOUTH ONE TIME PER WEEK 4 capsule 2    fenofibrate (Tricor) 48 mg tablet TAKE 1 TABLET DAILY AS     DIRECTED 90 tablet 0    FreeStyle Roverto 2 Sensor kit every 14 (fourteen) days.      furosemide (Lasix) 20 mg tablet Take 1 tablet (20 mg) by mouth once daily as needed (leg edema).      furosemide (Lasix) 40 mg tablet Take 1 tablet (40 mg) by mouth once daily. 3 DAYS FOR EDEMA THEN HOLD AND IF NEEDED      insulin aspart (NovoLOG Flexpen U-100 Insulin) 100 unit/mL (3 mL) pen INJECT PER SLIDING SCALE 30 UNITS MAXIMUM DAILY DOSAGE (REPLACING HUMALOG NOT COVERED BY INSURANCE) 30 mL 0    insulin lispro (HumaLOG KwikPen Insulin) 100 unit/mL injection Inject under the skin 3 times a day with meals. INJECT up to 6 UNITS 3 TIMES A DAY WITH MEALS AND PER SLIDING SCALE 55 UNITS MAX DAILY      Jardiance 25 mg       losartan (Cozaar) 100 mg tablet TAKE 1 TABLET DAILY 90 tablet 0    omega-3 acid ethyl esters (Lovaza) 1 gram capsule Take 1,000 capsules (1,000 g) by mouth 2 times a day.      OneTouch Verio test strips strip 3 times a day.      oxyCODONE (Roxicodone) 5 mg immediate release tablet Take 1 tablet (5 mg) by mouth every 8 hours if needed for severe pain (7 - 10).      Paxlovid 300 mg (150 mg x 2)-100 mg tablet therapy pack PLEASE SEE ATTACHED FOR DETAILED DIRECTIONS      potassium chloride CR 20 mEq ER tablet Take 1 tablet (20 mEq) by mouth once daily. WHILE ON FUROSEMIDE ONLY      pravastatin (Pravachol) 20 mg tablet TAKE 1 TABLET AT BEDTIME 90 tablet 2    pravastatin (Pravachol) 40 mg tablet Take 1 tablet (40 mg) by mouth once daily.      sildenafil (Viagra) 50 mg tablet TAKE 1 TABLET DAILY 1 HOUR BEFORE NEEDED 30 tablet 0     sulfamethoxazole-trimethoprim (Bactrim DS) 800-160 mg tablet Take 1 tablet by mouth every 12 hours.      testosterone cypionate (Depo-Testosterone) 200 mg/mL injection Inject 0.4 mL (80 mg) into the muscle 1 (one) time per week.      tirzepatide (Mounjaro) 7.5 mg/0.5 mL pen injector Inject 7.5 mg under the skin every 7 days. 6 mL 3    Toujeo SoloStar U-300 Insulin 300 unit/mL (1.5 mL) injection INJECT 28 UNITS SUBCUTANEOUSLY AT BEDTIME, IF BLOOD GLUCOSE IS GREATER THAN 220 INCREASE TO 32 UNITS AT BEDTIME 13.5 mL 2     Current Facility-Administered Medications   Medication Dose Route Frequency Provider Last Rate Last Admin    cyanocobalamin (Vitamin B-12) injection 1,000 mcg  1,000 mcg intramuscular Once Piper Alvarado MD        cyanocobalamin (Vitamin B-12) injection 1,000 mcg  1,000 mcg intramuscular Once Piper Alvarado MD            PMH:  Past Medical History:   Diagnosis Date    Abnormal finding of blood chemistry, unspecified 06/21/2017    Abnormal blood chemistry    Acute pharyngitis, unspecified 11/11/2019    Pharyngitis    Acute pharyngitis, unspecified 08/18/2020    Sore throat    Anosmia 04/19/2021    Anosmia    Calculus of kidney 01/04/2021    Kidney stone on right side    Cellulitis, unspecified 12/21/2022    Cellulitis    Chronic sinusitis, unspecified 04/26/2018    Sinusitis    Cough, unspecified 04/19/2021    Cough    Disorder of kidney and ureter, unspecified 11/14/2022    Renal insufficiency    Dorsalgia, unspecified 12/22/2020    Back pain    Edema, unspecified 11/16/2022    Edema    Encounter for screening for malignant neoplasm of colon 02/11/2020    Colon cancer screening    Encounter for screening for malignant neoplasm of prostate 01/19/2022    Screening for prostate cancer    Encounter for screening for malignant neoplasm of prostate 05/19/2020    Encounter for prostate cancer screening    Essential (primary) hypertension 11/16/2022    Benign essential  hypertension    Fever, unspecified 04/19/2021    Fever    Follicular cyst of the skin and subcutaneous tissue, unspecified 11/11/2019    Infected cyst of skin    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    Low HDL (under 40)    Liver disease, unspecified 01/27/2021    Liver lesion    Male erectile dysfunction, unspecified 02/07/2020    Erectile dysfunction    Myalgia, unspecified site 08/18/2020    Myalgia    Other chest pain 01/19/2022    Chest discomfort    Other conditions influencing health status 01/19/2022    Skin tear    Other fatigue 03/21/2019    Fatigue    Other malaise 08/18/2020    Malaise and fatigue    Other nonspecific abnormal finding of lung field 02/10/2021    Abnormal CT scan, lung    Pain in right knee 06/26/2020    Right knee pain    Pain in right shoulder 03/30/2022    Right shoulder pain    Proteinuria, unspecified 11/14/2022    Proteinuria    Proteinuria, unspecified 11/13/2015    Proteinuria    Type 2 diabetes mellitus without complications (CMS/HCC) 12/22/2022    Diabetes mellitus    Unspecified abdominal pain 05/26/2022    Abdominal pain    Unspecified open wound of unspecified toe(s) without damage to nail, initial encounter 03/16/2020    Wound, open, toe       PSH:  Past Surgical History:   Procedure Laterality Date    HAND SURGERY  04/04/2014    Hand Surgery                                                                                                                                                          KNEE ARTHROPLASTY  04/04/2014    Knee Arthroplasty    OTHER SURGICAL HISTORY  06/17/2020    Colonoscopy       FMH:  Family History   Problem Relation Name Age of Onset    Kidney cancer Mother         SHx:  Social History     Tobacco Use    Smoking status: Former     Types: Cigarettes    Smokeless tobacco: Never   Substance Use Topics    Alcohol use: Not Currently       Allergies:  No Known Allergies    Physical Exam   CONSTITUTIONAL:        No acute distress    HEAD:        Normocephalic  and atraumatic    CHEST / RESPIRATORY      no excess work of breathing, no respiratory distress,    ABDOMEN / GASTROINTESTINAL:        Abdomen nondistended      Assessment/Plan  #Hypogonadism   -Testosterone TC.4 cc weekly, hold for now due to polycythemia.      #Erectile Dysfunction  -Continue sildenafil for now.   -Patient will continue to monitor erections while on testosterone.      #polycythemia       -Patient will go donate blood.      #Screening for hyperestrogenemia   -E2   -arimidex if E is elevated      #Prostate cancer screening   -PSA.      #Fertility preservation   -Not interested, done having children.      fu in 2 months with low T labs. (Repeat hct    Scribe Attestation  By signing my name below, IDanika-Demarco , Scribe   attest that this documentation has been prepared under the direction and in the presence of Paris Rosales MD.

## 2023-12-22 DIAGNOSIS — I10 BENIGN ESSENTIAL HYPERTENSION: ICD-10-CM

## 2023-12-22 DIAGNOSIS — N52.9 ERECTILE DISORDER: ICD-10-CM

## 2023-12-26 RX ORDER — SILDENAFIL 50 MG/1
TABLET, FILM COATED ORAL
Qty: 30 TABLET | Refills: 0 | Status: SHIPPED | OUTPATIENT
Start: 2023-12-26 | End: 2024-02-19

## 2023-12-26 RX ORDER — AMLODIPINE BESYLATE 10 MG/1
10 TABLET ORAL DAILY
Qty: 90 TABLET | Refills: 0 | Status: SHIPPED | OUTPATIENT
Start: 2023-12-26 | End: 2024-03-18

## 2024-01-07 DIAGNOSIS — I10 BENIGN ESSENTIAL HYPERTENSION: ICD-10-CM

## 2024-01-09 RX ORDER — FENOFIBRATE 48 MG/1
48 TABLET, FILM COATED ORAL DAILY
Qty: 90 TABLET | Refills: 2 | Status: SHIPPED | OUTPATIENT
Start: 2024-01-09

## 2024-01-09 RX ORDER — LOSARTAN POTASSIUM 100 MG/1
100 TABLET ORAL DAILY
Qty: 90 TABLET | Refills: 2 | Status: SHIPPED | OUTPATIENT
Start: 2024-01-09

## 2024-01-12 NOTE — PROGRESS NOTES
Follow up for diabetes. LV with me 09/2023 ([facility] notes did not transfer into [facility])     History of Present Illness    58 y.o. male with hx of type 2 diabetes, obesity class II, HTN, erectile dysfunction.    Dx: 2015  HbA1c: 8.1% (1/16/2024), 8.3 (03/2023), 8.3% (12/2022)  Current regimen: Jardiance 25 mg QDAY, Toujeo 56 units QAM, Novolog 8 units BIDAC + SS#2, Mounjaro 7.5 mg/week  Past medications: metformin, glipizide, Trulicity  Complications: diabetic foot ulcer, nephropathy  Comorbidities: HTN, obesity    SMBG: Roverto 2    Hypoglycemia: no    Diet:  Breakfast (7:30 am)-Wasa crackers with peanut butter and jelly, roasted salmon and veggies, black coffee, hard boiled eggs, olives  Lunch-typically doesn't eat lunch  Dinner (6-8 pm)-salad, roasted chicken or pork, beef or fish, roasted or steamed vegetables, occasional rice or noodles  Snacks-popcorn, fruit (cantaloup)  Drinks: water with Crystal Light packets, black coffee, one can diet pepsi per week    Exercise: doing band exercises     ROS  General: no fever or chills  CV: no chest pain   Respiratory: no shortness of breath  MSK: no lower extremity edema  Neuro: no headache or dizziness  See HPI for Endocrine ROS    Past Medical History:   Diagnosis Date    Abnormal finding of blood chemistry, unspecified 06/21/2017    Abnormal blood chemistry    Acute pharyngitis, unspecified 11/11/2019    Pharyngitis    Acute pharyngitis, unspecified 08/18/2020    Sore throat    Anosmia 04/19/2021    Anosmia    Calculus of kidney 01/04/2021    Kidney stone on right side    Cellulitis, unspecified 12/21/2022    Cellulitis    Chronic sinusitis, unspecified 04/26/2018    Sinusitis    Cough, unspecified 04/19/2021    Cough    Disorder of kidney and ureter, unspecified 11/14/2022    Renal insufficiency    Dorsalgia, unspecified 12/22/2020    Back pain    Edema, unspecified 11/16/2022    Edema    Encounter for screening for malignant neoplasm of colon 02/11/2020    Colon cancer  screening    Encounter for screening for malignant neoplasm of prostate 01/19/2022    Screening for prostate cancer    Encounter for screening for malignant neoplasm of prostate 05/19/2020    Encounter for prostate cancer screening    Essential (primary) hypertension 11/16/2022    Benign essential hypertension    Fever, unspecified 04/19/2021    Fever    Follicular cyst of the skin and subcutaneous tissue, unspecified 11/11/2019    Infected cyst of skin    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    Low HDL (under 40)    Liver disease, unspecified 01/27/2021    Liver lesion    Male erectile dysfunction, unspecified 02/07/2020    Erectile dysfunction    Myalgia, unspecified site 08/18/2020    Myalgia    Other chest pain 01/19/2022    Chest discomfort    Other conditions influencing health status 01/19/2022    Skin tear    Other fatigue 03/21/2019    Fatigue    Other malaise 08/18/2020    Malaise and fatigue    Other nonspecific abnormal finding of lung field 02/10/2021    Abnormal CT scan, lung    Pain in right knee 06/26/2020    Right knee pain    Pain in right shoulder 03/30/2022    Right shoulder pain    Proteinuria, unspecified 11/14/2022    Proteinuria    Proteinuria, unspecified 11/13/2015    Proteinuria    Type 2 diabetes mellitus without complications (CMS/HCC) 12/22/2022    Diabetes mellitus    Unspecified abdominal pain 05/26/2022    Abdominal pain    Unspecified open wound of unspecified toe(s) without damage to nail, initial encounter 03/16/2020    Wound, open, toe       Past Surgical History:   Procedure Laterality Date    HAND SURGERY  04/04/2014    Hand Surgery                                                                                                                                                          KNEE ARTHROPLASTY  04/04/2014    Knee Arthroplasty    OTHER SURGICAL HISTORY  06/17/2020    Colonoscopy       Social History     Socioeconomic History    Marital status:      Spouse name: Not on  "file    Number of children: Not on file    Years of education: Not on file    Highest education level: Not on file   Occupational History    Not on file   Tobacco Use    Smoking status: Former     Types: Cigarettes    Smokeless tobacco: Never   Substance and Sexual Activity    Alcohol use: Not Currently    Drug use: Not on file    Sexual activity: Not on file   Other Topics Concern    Not on file   Social History Narrative    Not on file     Social Determinants of Health     Financial Resource Strain: Not on file   Food Insecurity: Not on file   Transportation Needs: Not on file   Physical Activity: Not on file   Stress: Not on file   Social Connections: Not on file   Intimate Partner Violence: Not on file   Housing Stability: Not on file       Physical Exam   vitals were not taken for this visit.   General: not in acute distress  HEENT: JOVAN, EOMI  Thyroid: no goiter  Neuro: alert and oriented x 3    Current Outpatient Medications   Medication Sig Dispense Refill    acetaminophen (Tylenol) 325 mg tablet Take 2 tablets (650 mg) by mouth every 4 hours if needed (pain).      amLODIPine (Norvasc) 10 mg tablet TAKE 1 TABLET DAILY 90 tablet 0    BD Romana 2nd Gen Pen Needle 32 gauge x 5/32\" needle 4 times a day.      chlorthalidone (Hygroton) 25 mg tablet Take 1 tablet (25 mg) by mouth once daily.      cholecalciferol (Vitamin D-3) 25 MCG (1000 UT) tablet Take 1 tablet (25 mcg) by mouth once daily.      cyanocobalamin (Vitamin B-12) 1,000 mcg/mL injection Inject 2 mL (2,000 mcg) into the muscle. per AQ.      doxycycline (Vibramycin) 100 mg capsule Take 1 capsule (100 mg) by mouth 2 times a day.      ergocalciferol (Vitamin D-2) 1.25 MG (59371 UT) capsule TAKE 1 CAPSULE BY MOUTH ONE TIME PER WEEK 4 capsule 2    fenofibrate (Tricor) 48 mg tablet TAKE 1 TABLET DAILY AS     DIRECTED 90 tablet 2    FreeStyle Roverto 2 Sensor kit every 14 (fourteen) days.      furosemide (Lasix) 20 mg tablet Take 1 tablet (20 mg) by mouth once " daily as needed (leg edema).      furosemide (Lasix) 40 mg tablet Take 1 tablet (40 mg) by mouth once daily. 3 DAYS FOR EDEMA THEN HOLD AND IF NEEDED      insulin aspart (NovoLOG Flexpen U-100 Insulin) 100 unit/mL (3 mL) pen INJECT PER SLIDING SCALE 30 UNITS MAXIMUM DAILY DOSAGE (REPLACING HUMALOG NOT COVERED BY INSURANCE) 30 mL 0    insulin lispro (HumaLOG KwikPen Insulin) 100 unit/mL injection Inject under the skin 3 times a day with meals. INJECT up to 6 UNITS 3 TIMES A DAY WITH MEALS AND PER SLIDING SCALE 55 UNITS MAX DAILY      Jardiance 25 mg       losartan (Cozaar) 100 mg tablet TAKE 1 TABLET DAILY 90 tablet 2    omega-3 acid ethyl esters (Lovaza) 1 gram capsule Take 1,000 capsules (1,000 g) by mouth 2 times a day.      OneTouch Verio test strips strip 3 times a day.      oxyCODONE (Roxicodone) 5 mg immediate release tablet Take 1 tablet (5 mg) by mouth every 8 hours if needed for severe pain (7 - 10).      Paxlovid 300 mg (150 mg x 2)-100 mg tablet therapy pack PLEASE SEE ATTACHED FOR DETAILED DIRECTIONS      potassium chloride CR 20 mEq ER tablet Take 1 tablet (20 mEq) by mouth once daily. WHILE ON FUROSEMIDE ONLY      pravastatin (Pravachol) 20 mg tablet TAKE 1 TABLET AT BEDTIME 90 tablet 2    pravastatin (Pravachol) 40 mg tablet Take 1 tablet (40 mg) by mouth once daily.      sildenafil (Viagra) 50 mg tablet TAKE 1 TABLET DAILY 1 HOUR BEFORE NEEDED 30 tablet 0    sulfamethoxazole-trimethoprim (Bactrim DS) 800-160 mg tablet Take 1 tablet by mouth every 12 hours.      testosterone cypionate (Depo-Testosterone) 200 mg/mL injection Inject 0.4 mL (80 mg) into the muscle 1 (one) time per week.      tirzepatide (Mounjaro) 7.5 mg/0.5 mL pen injector Inject 7.5 mg under the skin every 7 days. 6 mL 3    Toujeo SoloStar U-300 Insulin 300 unit/mL (1.5 mL) injection INJECT 28 UNITS SUBCUTANEOUSLY AT BEDTIME, IF BLOOD GLUCOSE IS GREATER THAN 220 INCREASE TO 32 UNITS AT BEDTIME 13.5 mL 2     Current  Facility-Administered Medications   Medication Dose Route Frequency Provider Last Rate Last Admin    cyanocobalamin (Vitamin B-12) injection 1,000 mcg  1,000 mcg intramuscular Once Piper Alvarado MD        cyanocobalamin (Vitamin B-12) injection 1,000 mcg  1,000 mcg intramuscular Once Piper Alvarado MD               Assessment and Plan  58 y.o. male with hx of type 2 diabetes, obesity class II, HTN, erectile dysfunction, here for management of diabetes.    1. Type 2 diabetes mellitus  HbA1c: 8.1% (12/2023), 8.3 (03/2023), 8.3% (12/2022)  Current regimen: Jardiance 25 mg QDAY, Toujeo 56 units QAM, Humalog 8 units BIDAC + SS#2, Mounjaro 7.5 mg/week  Eye exam: no DR (12/2023) [facility] [place]  Urine microalbumin: 332 ug/mg (12/2023) on SGLT2i since 03/2023  Podiatry: [facility], Dr. Kirill Barksdale, last seen 9/18/23  Lipids: HDL 39, LDL 54,  (12/2023) on fenofibrate    A1c: 8.1% in December. Slowly improving.  Roverto download reviewed.   Still with post-prandial hyperglycemia to the high 200s to low 300s.  Tolerating Mounjaro. Has appetite suppression.  Taking Novolog BEFORE breakfast and dinner but only using the sliding scale. Did not know to take the 8 units.  Discussed that he should take 8 units plus the sliding scale. Will have him start this.  Refill all meds including statin [facility].    PLAN:  -increase Mounjaro to 10 mg/week  -Toujeo 56 units QAM   -start Humalog 8 units BIDAC + SS #2  -continue Jardiance  -check blood sugars before every meal and bedtime    Follow-up in 3 months

## 2024-01-16 ENCOUNTER — OFFICE VISIT (OUTPATIENT)
Dept: ENDOCRINOLOGY | Facility: CLINIC | Age: 59
End: 2024-01-16
Payer: COMMERCIAL

## 2024-01-16 VITALS
HEART RATE: 80 BPM | HEIGHT: 78 IN | BODY MASS INDEX: 36.45 KG/M2 | WEIGHT: 315 LBS | SYSTOLIC BLOOD PRESSURE: 133 MMHG | DIASTOLIC BLOOD PRESSURE: 81 MMHG

## 2024-01-16 DIAGNOSIS — E11.59 TYPE 2 DIABETES MELLITUS WITH OTHER CIRCULATORY COMPLICATION, UNSPECIFIED WHETHER LONG TERM INSULIN USE (MULTI): ICD-10-CM

## 2024-01-16 DIAGNOSIS — Z79.4 TYPE 2 DIABETES MELLITUS WITHOUT COMPLICATION, WITH LONG-TERM CURRENT USE OF INSULIN (MULTI): ICD-10-CM

## 2024-01-16 DIAGNOSIS — E11.65 TYPE 2 DIABETES MELLITUS WITH HYPERGLYCEMIA, WITH LONG-TERM CURRENT USE OF INSULIN (MULTI): Primary | ICD-10-CM

## 2024-01-16 DIAGNOSIS — Z79.4 TYPE 2 DIABETES MELLITUS WITH HYPERGLYCEMIA, WITH LONG-TERM CURRENT USE OF INSULIN (MULTI): Primary | ICD-10-CM

## 2024-01-16 DIAGNOSIS — E11.9 TYPE 2 DIABETES MELLITUS WITHOUT COMPLICATION, WITH LONG-TERM CURRENT USE OF INSULIN (MULTI): ICD-10-CM

## 2024-01-16 PROCEDURE — 99215 OFFICE O/P EST HI 40 MIN: CPT | Performed by: STUDENT IN AN ORGANIZED HEALTH CARE EDUCATION/TRAINING PROGRAM

## 2024-01-16 PROCEDURE — 1036F TOBACCO NON-USER: CPT | Performed by: STUDENT IN AN ORGANIZED HEALTH CARE EDUCATION/TRAINING PROGRAM

## 2024-01-16 PROCEDURE — 4010F ACE/ARB THERAPY RXD/TAKEN: CPT | Performed by: STUDENT IN AN ORGANIZED HEALTH CARE EDUCATION/TRAINING PROGRAM

## 2024-01-16 PROCEDURE — 95251 CONT GLUC MNTR ANALYSIS I&R: CPT | Performed by: STUDENT IN AN ORGANIZED HEALTH CARE EDUCATION/TRAINING PROGRAM

## 2024-01-16 PROCEDURE — 3079F DIAST BP 80-89 MM HG: CPT | Performed by: STUDENT IN AN ORGANIZED HEALTH CARE EDUCATION/TRAINING PROGRAM

## 2024-01-16 PROCEDURE — 3075F SYST BP GE 130 - 139MM HG: CPT | Performed by: STUDENT IN AN ORGANIZED HEALTH CARE EDUCATION/TRAINING PROGRAM

## 2024-01-16 PROCEDURE — 3008F BODY MASS INDEX DOCD: CPT | Performed by: STUDENT IN AN ORGANIZED HEALTH CARE EDUCATION/TRAINING PROGRAM

## 2024-01-16 RX ORDER — TIRZEPATIDE 10 MG/.5ML
10 INJECTION, SOLUTION SUBCUTANEOUS
Qty: 6 ML | Refills: 1 | Status: SHIPPED | OUTPATIENT
Start: 2024-01-16

## 2024-01-16 RX ORDER — INSULIN LISPRO 100 [IU]/ML
INJECTION, SOLUTION INTRAVENOUS; SUBCUTANEOUS
Qty: 45 ML | Refills: 1 | Status: SHIPPED | OUTPATIENT
Start: 2024-01-16 | End: 2024-01-18 | Stop reason: WASHOUT

## 2024-01-16 RX ORDER — FLASH GLUCOSE SENSOR
KIT MISCELLANEOUS
Qty: 6 EACH | Refills: 3 | Status: SHIPPED | OUTPATIENT
Start: 2024-01-16

## 2024-01-16 RX ORDER — INSULIN GLARGINE 300 U/ML
INJECTION, SOLUTION SUBCUTANEOUS
Qty: 18 ML | Refills: 1 | Status: SHIPPED | OUTPATIENT
Start: 2024-01-16 | End: 2024-05-02 | Stop reason: SDUPTHER

## 2024-01-16 RX ORDER — EMPAGLIFLOZIN 25 MG/1
25 TABLET, FILM COATED ORAL DAILY
Qty: 90 TABLET | Refills: 3 | Status: SHIPPED | OUTPATIENT
Start: 2024-01-16

## 2024-01-16 RX ORDER — PEN NEEDLE, DIABETIC 32GX 5/32"
NEEDLE, DISPOSABLE MISCELLANEOUS
Qty: 400 EACH | Refills: 3 | Status: SHIPPED | OUTPATIENT
Start: 2024-01-16 | End: 2024-05-02 | Stop reason: ALTCHOICE

## 2024-01-16 RX ORDER — PRAVASTATIN SODIUM 20 MG/1
20 TABLET ORAL NIGHTLY
Qty: 90 TABLET | Refills: 3 | Status: SHIPPED | OUTPATIENT
Start: 2024-01-16

## 2024-01-18 ENCOUNTER — OFFICE VISIT (OUTPATIENT)
Dept: PRIMARY CARE | Facility: CLINIC | Age: 59
End: 2024-01-18
Payer: COMMERCIAL

## 2024-01-18 ENCOUNTER — TELEPHONE (OUTPATIENT)
Dept: ENDOCRINOLOGY | Facility: CLINIC | Age: 59
End: 2024-01-18

## 2024-01-18 VITALS — DIASTOLIC BLOOD PRESSURE: 84 MMHG | SYSTOLIC BLOOD PRESSURE: 130 MMHG | WEIGHT: 315 LBS | BODY MASS INDEX: 37.56 KG/M2

## 2024-01-18 DIAGNOSIS — M54.9 BACK PAIN, UNSPECIFIED BACK LOCATION, UNSPECIFIED BACK PAIN LATERALITY, UNSPECIFIED CHRONICITY: Primary | ICD-10-CM

## 2024-01-18 DIAGNOSIS — E11.65 TYPE 2 DIABETES MELLITUS WITH HYPERGLYCEMIA, WITH LONG-TERM CURRENT USE OF INSULIN (MULTI): Primary | ICD-10-CM

## 2024-01-18 DIAGNOSIS — Z79.4 TYPE 2 DIABETES MELLITUS WITH HYPERGLYCEMIA, WITH LONG-TERM CURRENT USE OF INSULIN (MULTI): Primary | ICD-10-CM

## 2024-01-18 PROCEDURE — 3008F BODY MASS INDEX DOCD: CPT | Performed by: INTERNAL MEDICINE

## 2024-01-18 PROCEDURE — 99213 OFFICE O/P EST LOW 20 MIN: CPT | Performed by: INTERNAL MEDICINE

## 2024-01-18 PROCEDURE — 3075F SYST BP GE 130 - 139MM HG: CPT | Performed by: INTERNAL MEDICINE

## 2024-01-18 PROCEDURE — 3079F DIAST BP 80-89 MM HG: CPT | Performed by: INTERNAL MEDICINE

## 2024-01-18 PROCEDURE — 4010F ACE/ARB THERAPY RXD/TAKEN: CPT | Performed by: INTERNAL MEDICINE

## 2024-01-18 PROCEDURE — 1036F TOBACCO NON-USER: CPT | Performed by: INTERNAL MEDICINE

## 2024-01-18 RX ORDER — INSULIN ASPART 100 [IU]/ML
INJECTION, SOLUTION INTRAVENOUS; SUBCUTANEOUS
Qty: 45 ML | Refills: 1 | Status: SHIPPED | OUTPATIENT
Start: 2024-01-18

## 2024-01-18 RX ORDER — ETODOLAC 500 MG/1
500 TABLET, FILM COATED ORAL 2 TIMES DAILY
Qty: 20 TABLET | Refills: 0 | Status: SHIPPED | OUTPATIENT
Start: 2024-01-18 | End: 2024-01-28

## 2024-01-18 RX ORDER — METAXALONE 800 MG/1
800 TABLET ORAL ONCE AS NEEDED
Qty: 20 TABLET | Refills: 0 | Status: SHIPPED | OUTPATIENT
Start: 2024-01-18

## 2024-01-18 ASSESSMENT — ENCOUNTER SYMPTOMS
OCCASIONAL FEELINGS OF UNSTEADINESS: 0
DEPRESSION: 0
LOSS OF SENSATION IN FEET: 0

## 2024-01-18 ASSESSMENT — COLUMBIA-SUICIDE SEVERITY RATING SCALE - C-SSRS
6. HAVE YOU EVER DONE ANYTHING, STARTED TO DO ANYTHING, OR PREPARED TO DO ANYTHING TO END YOUR LIFE?: NO
2. HAVE YOU ACTUALLY HAD ANY THOUGHTS OF KILLING YOURSELF?: NO
1. IN THE PAST MONTH, HAVE YOU WISHED YOU WERE DEAD OR WISHED YOU COULD GO TO SLEEP AND NOT WAKE UP?: NO

## 2024-01-18 ASSESSMENT — PATIENT HEALTH QUESTIONNAIRE - PHQ9
1. LITTLE INTEREST OR PLEASURE IN DOING THINGS: NOT AT ALL
SUM OF ALL RESPONSES TO PHQ9 QUESTIONS 1 AND 2: 0
2. FEELING DOWN, DEPRESSED OR HOPELESS: NOT AT ALL

## 2024-01-18 NOTE — TELEPHONE ENCOUNTER
Notification from [employer] that insurance prefers novolog or fiasp. Please resend for a 90 day supply if you agree.

## 2024-01-18 NOTE — PROGRESS NOTES
Subjective   Patient ID: Franck Mckinley is a 58 y.o. male who presents for Back Pain (Lower left side.  Burning sensation down left leg.) and EPV.    HPI  Patient in for a visit  Two days ago tingle in right buttock then burning sensation going down the left leg   Took aleve and 1 tylenol helped a little not enough to help him sleep  No bowel or bladder control problems    Review of Systems  General: Denies fever, chills, night sweats,  Eyes: Negative for recent visual changes  Ears, Nose, Throat :  Negative for hearing changes, sinus discomfort  Dermatologic: Negative for new skin conditions, rash  Respiratory: Negative for wheezing, shortness of breath, cough  Cardiovascular: Negative for chest pain, palpitations, or leg swelling  Gastrointestinal: Negative for nausea/vomiting, abdominal pain, changes in bowel habits  Genitourinary NEGATIVE FOR URINARY INCONTINENCE urgency , frequency, discomfort   Musculoskeletal: see hpi  Neurological: Negative for headaches, dizziness    Previous history  Past Medical History:   Diagnosis Date   • Abnormal finding of blood chemistry, unspecified 06/21/2017    Abnormal blood chemistry   • Acute pharyngitis, unspecified 11/11/2019    Pharyngitis   • Acute pharyngitis, unspecified 08/18/2020    Sore throat   • Anosmia 04/19/2021    Anosmia   • Calculus of kidney 01/04/2021    Kidney stone on right side   • Cellulitis, unspecified 12/21/2022    Cellulitis   • Chronic sinusitis, unspecified 04/26/2018    Sinusitis   • Cough, unspecified 04/19/2021    Cough   • Disorder of kidney and ureter, unspecified 11/14/2022    Renal insufficiency   • Dorsalgia, unspecified 12/22/2020    Back pain   • Edema, unspecified 11/16/2022    Edema   • Encounter for screening for malignant neoplasm of colon 02/11/2020    Colon cancer screening   • Encounter for screening for malignant neoplasm of prostate 01/19/2022    Screening for prostate cancer   • Encounter for screening for malignant neoplasm of  prostate 05/19/2020    Encounter for prostate cancer screening   • Essential (primary) hypertension 11/16/2022    Benign essential hypertension   • Fever, unspecified 04/19/2021    Fever   • Follicular cyst of the skin and subcutaneous tissue, unspecified 11/11/2019    Infected cyst of skin   • 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    Low HDL (under 40)   • Liver disease, unspecified 01/27/2021    Liver lesion   • Male erectile dysfunction, unspecified 02/07/2020    Erectile dysfunction   • Myalgia, unspecified site 08/18/2020    Myalgia   • Other chest pain 01/19/2022    Chest discomfort   • Other conditions influencing health status 01/19/2022    Skin tear   • Other fatigue 03/21/2019    Fatigue   • Other malaise 08/18/2020    Malaise and fatigue   • Other nonspecific abnormal finding of lung field 02/10/2021    Abnormal CT scan, lung   • Pain in right knee 06/26/2020    Right knee pain   • Pain in right shoulder 03/30/2022    Right shoulder pain   • Proteinuria, unspecified 11/14/2022    Proteinuria   • Proteinuria, unspecified 11/13/2015    Proteinuria   • Type 2 diabetes mellitus without complications (CMS/HCC) 12/22/2022    Diabetes mellitus   • Unspecified abdominal pain 05/26/2022    Abdominal pain   • Unspecified open wound of unspecified toe(s) without damage to nail, initial encounter 03/16/2020    Wound, open, toe     Past Surgical History:   Procedure Laterality Date   • HAND SURGERY  04/04/2014    Hand Surgery                                                                                                                                                         • KNEE ARTHROPLASTY  04/04/2014    Knee Arthroplasty   • OTHER SURGICAL HISTORY  06/17/2020    Colonoscopy     Social History     Tobacco Use   • Smoking status: Former     Types: Cigarettes   • Smokeless tobacco: Never   Substance Use Topics   • Alcohol use: Not Currently   • Drug use: Never     Family History   Problem Relation Name Age of Onset   •  "Kidney cancer Mother       No Known Allergies  Current Outpatient Medications   Medication Instructions   • acetaminophen (Tylenol) 325 mg tablet 2 tablets, oral, Every 4 hours PRN   • amLODIPine (NORVASC) 10 mg, oral, Daily   • BD Romana 2nd Gen Pen Needle 32 gauge x 5/32\" needle Use to inject insulin 4 times per day.   • chlorthalidone (HYGROTON) 25 mg, oral, Daily   • cholecalciferol (Vitamin D-3) 25 MCG (1000 UT) tablet 1 tablet, oral, Daily   • cyanocobalamin (Vitamin B-12) 1,000 mcg/mL injection 2 mL, intramuscular, per AQ.   • doxycycline (Vibramycin) 100 mg capsule 1 capsule, oral, 2 times daily   • ergocalciferol (VITAMIN D-2) 1,250 mcg, oral, Weekly   • fenofibrate (TRICOR) 48 mg, oral, Daily, as directed   • FreeStyle Roverto 2 Sensor kit Change sensor every 14 days   • furosemide (LASIX) 20 mg, oral, Daily PRN   • furosemide (LASIX) 40 mg, oral, Daily, 3 DAYS FOR EDEMA THEN HOLD AND IF NEEDED   • insulin glargine (Toujeo SoloStar U-300 Insulin) 300 unit/mL (1.5 mL) injection Inject 56 units every morning.   • insulin lispro (HumaLOG KwikPen Insulin) 100 unit/mL injection Inject 8 units before every meal. Use sliding scale as directed. MDD 40 units.   • Jardiance 25 mg, oral, Daily   • losartan (COZAAR) 100 mg, oral, Daily   • Mounjaro 10 mg, subcutaneous, Weekly   • omega-3 acid ethyl esters (LOVAZA) 1,000 g, oral, 2 times daily   • OneTouch Verio test strips strip 3 times daily   • oxyCODONE (ROXICODONE) 5 mg, oral, Every 8 hours PRN   • Paxlovid 300 mg (150 mg x 2)-100 mg tablet therapy pack PLEASE SEE ATTACHED FOR DETAILED DIRECTIONS   • potassium chloride CR 20 mEq ER tablet 20 mEq, oral, Daily, WHILE ON FUROSEMIDE ONLY<BR>   • pravastatin (PRAVACHOL) 20 mg, oral, Nightly   • sildenafil (Viagra) 50 mg tablet TAKE 1 TABLET DAILY 1 HOUR BEFORE NEEDED   • sulfamethoxazole-trimethoprim (Bactrim DS) 800-160 mg tablet 1 tablet, oral, Every 12 hours   • testosterone cypionate (Depo-Testosterone) 200 mg/mL " injection 0.4 mL, intramuscular, Weekly       Objective       Physical Exam  Vital Signs: as recorded above  General: Well groomed, well nourished   Orientation:  Alert , oriented to time, place , and person   Mood and Affect:  Cooperative , no apparent distress normal affect  Skin: Good color, good turgor  Eyes: Extra ocular muscle movements intact, anicteric sclerae  Neck: Supple, full range of movement  Chest: Normal breath sounds, normal chest wall exam, symmetric, good air entry, clear to auscultation  Heart: Regular rate and rhythm, without murmur, gallop, or rubs  BACK:  no CTLS spine tenderness, no flank tenderness  Extremities: full range of movement  bilateral UE and bilateral LE,  no lower extremity edema  Neurological: Alert, oriented, cranial nerves II-XII grossly intact except for visual acuity  Sensation:  Intact   Gait: normal steady      Assessment/Plan   Franck Mckinley is a 58 y.o. male who presents for the concerns below:    Problem List Items Addressed This Visit    None    DIABETES MELLITUS PLAN:better hbaic down to 8 with current medication regimen  Follow Diabetic diet, seeing Dr Sang terry     Back Pain Plan : Rest no strenuous activity , heavy lifting    Stretching exercises    Ice compress initially followed by alternating with heat compress     NSAIDs with meals   Muscle relaxant, cautioned on sedation if with sedation just take one tablet at bedtime   If not improved will order physical therapy and order x-ray of the spine/back.    If persistent will consult with spine specialist   If with any bowel or bladder dysfunction or falling  due to weakness advised emergent evaluation.    Follow-up as planned to make sure problem has resolved.          Discussed with:   Return in :    Portions of this note were generated using digital voice recognition software, and may contain grammatical errors       Piper Alvarado MD  01/18/24  1:57 PM

## 2024-02-02 ENCOUNTER — APPOINTMENT (OUTPATIENT)
Dept: PRIMARY CARE | Facility: CLINIC | Age: 59
End: 2024-02-02
Payer: COMMERCIAL

## 2024-02-04 DIAGNOSIS — E55.9 VITAMIN D DEFICIENCY: ICD-10-CM

## 2024-02-05 RX ORDER — ERGOCALCIFEROL 1.25 MG/1
1 CAPSULE ORAL
Qty: 4 CAPSULE | Refills: 2 | Status: SHIPPED | OUTPATIENT
Start: 2024-02-05 | End: 2024-04-23

## 2024-02-16 ENCOUNTER — LAB (OUTPATIENT)
Dept: LAB | Facility: LAB | Age: 59
End: 2024-02-16
Payer: COMMERCIAL

## 2024-02-16 ENCOUNTER — OFFICE VISIT (OUTPATIENT)
Dept: PRIMARY CARE | Facility: CLINIC | Age: 59
End: 2024-02-16
Payer: COMMERCIAL

## 2024-02-16 VITALS
WEIGHT: 315 LBS | HEIGHT: 78 IN | BODY MASS INDEX: 36.45 KG/M2 | SYSTOLIC BLOOD PRESSURE: 135 MMHG | DIASTOLIC BLOOD PRESSURE: 79 MMHG

## 2024-02-16 DIAGNOSIS — Z00.00 ROUTINE GENERAL MEDICAL EXAMINATION AT A HEALTH CARE FACILITY: Primary | ICD-10-CM

## 2024-02-16 DIAGNOSIS — Z00.00 ROUTINE GENERAL MEDICAL EXAMINATION AT A HEALTH CARE FACILITY: ICD-10-CM

## 2024-02-16 DIAGNOSIS — E29.1 HYPOGONADISM IN MALE: ICD-10-CM

## 2024-02-16 DIAGNOSIS — Z12.5 PROSTATE CANCER SCREENING: ICD-10-CM

## 2024-02-16 DIAGNOSIS — E53.8 B12 DEFICIENCY: ICD-10-CM

## 2024-02-16 LAB
ERYTHROCYTE [DISTWIDTH] IN BLOOD BY AUTOMATED COUNT: 13.9 % (ref 11.5–14.5)
ESTRADIOL SERPL-MCNC: 20 PG/ML
HCT VFR BLD AUTO: 49.9 % (ref 41–52)
HGB BLD-MCNC: 16.3 G/DL (ref 13.5–17.5)
LH SERPL-ACNC: 3 IU/L
MCH RBC QN AUTO: 27.2 PG (ref 26–34)
MCHC RBC AUTO-ENTMCNC: 32.7 G/DL (ref 32–36)
MCV RBC AUTO: 83 FL (ref 80–100)
NRBC BLD-RTO: 0 /100 WBCS (ref 0–0)
PLATELET # BLD AUTO: 173 X10*3/UL (ref 150–450)
PSA SERPL-MCNC: 0.71 NG/ML
RBC # BLD AUTO: 5.99 X10*6/UL (ref 4.5–5.9)
TESTOST SERPL-MCNC: 110 NG/DL (ref 240–1000)
TSH SERPL-ACNC: 2.87 MIU/L (ref 0.44–3.98)
WBC # BLD AUTO: 9.8 X10*3/UL (ref 4.4–11.3)

## 2024-02-16 PROCEDURE — 84403 ASSAY OF TOTAL TESTOSTERONE: CPT

## 2024-02-16 PROCEDURE — 85027 COMPLETE CBC AUTOMATED: CPT

## 2024-02-16 PROCEDURE — 3078F DIAST BP <80 MM HG: CPT | Performed by: INTERNAL MEDICINE

## 2024-02-16 PROCEDURE — 82670 ASSAY OF TOTAL ESTRADIOL: CPT

## 2024-02-16 PROCEDURE — 84443 ASSAY THYROID STIM HORMONE: CPT

## 2024-02-16 PROCEDURE — 99396 PREV VISIT EST AGE 40-64: CPT | Performed by: INTERNAL MEDICINE

## 2024-02-16 PROCEDURE — 3008F BODY MASS INDEX DOCD: CPT | Performed by: INTERNAL MEDICINE

## 2024-02-16 PROCEDURE — 84153 ASSAY OF PSA TOTAL: CPT

## 2024-02-16 PROCEDURE — 36415 COLL VENOUS BLD VENIPUNCTURE: CPT

## 2024-02-16 PROCEDURE — 83002 ASSAY OF GONADOTROPIN (LH): CPT

## 2024-02-16 PROCEDURE — 4010F ACE/ARB THERAPY RXD/TAKEN: CPT | Performed by: INTERNAL MEDICINE

## 2024-02-16 PROCEDURE — 1036F TOBACCO NON-USER: CPT | Performed by: INTERNAL MEDICINE

## 2024-02-16 PROCEDURE — 3075F SYST BP GE 130 - 139MM HG: CPT | Performed by: INTERNAL MEDICINE

## 2024-02-16 ASSESSMENT — ENCOUNTER SYMPTOMS
OCCASIONAL FEELINGS OF UNSTEADINESS: 0
DEPRESSION: 0
LOSS OF SENSATION IN FEET: 0

## 2024-02-16 NOTE — PROGRESS NOTES
Subjective   Patient ID: Franck Mckinley is a 58 y.o. male who presents for Annual Exam.    HPI  Patient comes in for a physical exam last one done over a year ago , doing well over-all with no particular complaints. Also is in for laboratory review and health maintenance update.  Updating family history as well.  Interval event - past medical history, surgical, social, and family history reviewed and updated.  Interval care -  Patient is    up to date with dental care.  Patient does     receive routine vision care.    Seeing Dr Mcneal  on Monday so he is getting his blood work   Seeing endo DR Domínguez , he has freestyle christiane cgm on last hba1c 8.0   Also seeing Dr Castillo  Still on once weekly vit d    Review of Systems  General: Denies fever, chills, night sweats, changes in appetite or weight  ENT: Negative for ear pain, hearing loss, headache, difficulty swallowing, up to date with dental checks   Eyes: Negative for recent visual changes, up to date with eye exams  Dermatologic: Negative for new skin conditions, rash  Respiratory: Negative for paroxysmal nocturnal dyspnea, wheezing,shortness of breath, cough  Cardiovascular: Negative for chest pain, palpitations, or leg swelling  Gastrointestinal: Negative for nausea/vomiting, abdominal pain, changes in bowel habits  Genitourinary: Negative for dysuria, urgency, frequency  URINARY INCONTINENCE   Neurological: Negative for headaches, tremors, dizziness, memory loss, confusion, weakness, paresthesias  Psychiatric: Negative for sleep problems, anxiety, depression, conditions are stable  Endocrine: Negative for heat or cold intolerance, polyuria, polydipsia  Other:All systems have been reviewed and are negative except as previously noted.    Previous history  Past Medical History:   Diagnosis Date    Abnormal finding of blood chemistry, unspecified 06/21/2017    Abnormal blood chemistry    Acute pharyngitis, unspecified 11/11/2019    Pharyngitis    Acute  pharyngitis, unspecified 08/18/2020    Sore throat    Anosmia 04/19/2021    Anosmia    Calculus of kidney 01/04/2021    Kidney stone on right side    Cellulitis, unspecified 12/21/2022    Cellulitis    Chronic sinusitis, unspecified 04/26/2018    Sinusitis    Cough, unspecified 04/19/2021    Cough    Disorder of kidney and ureter, unspecified 11/14/2022    Renal insufficiency    Dorsalgia, unspecified 12/22/2020    Back pain    Edema, unspecified 11/16/2022    Edema    Encounter for screening for malignant neoplasm of colon 02/11/2020    Colon cancer screening    Encounter for screening for malignant neoplasm of prostate 01/19/2022    Screening for prostate cancer    Encounter for screening for malignant neoplasm of prostate 05/19/2020    Encounter for prostate cancer screening    Essential (primary) hypertension 11/16/2022    Benign essential hypertension    Fever, unspecified 04/19/2021    Fever    Follicular cyst of the skin and subcutaneous tissue, unspecified 11/11/2019    Infected cyst of skin    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    Low HDL (under 40)    Liver disease, unspecified 01/27/2021    Liver lesion    Male erectile dysfunction, unspecified 02/07/2020    Erectile dysfunction    Myalgia, unspecified site 08/18/2020    Myalgia    Other chest pain 01/19/2022    Chest discomfort    Other conditions influencing health status 01/19/2022    Skin tear    Other fatigue 03/21/2019    Fatigue    Other malaise 08/18/2020    Malaise and fatigue    Other nonspecific abnormal finding of lung field 02/10/2021    Abnormal CT scan, lung    Pain in right knee 06/26/2020    Right knee pain    Pain in right shoulder 03/30/2022    Right shoulder pain    Proteinuria, unspecified 11/14/2022    Proteinuria    Proteinuria, unspecified 11/13/2015    Proteinuria    Type 2 diabetes mellitus without complications (CMS/HCC) 12/22/2022    Diabetes mellitus    Unspecified abdominal pain 05/26/2022    Abdominal pain    Unspecified open  "wound of unspecified toe(s) without damage to nail, initial encounter 03/16/2020    Wound, open, toe     Past Surgical History:   Procedure Laterality Date    HAND SURGERY  04/04/2014    Hand Surgery                                                                                                                                                          KNEE ARTHROPLASTY  04/04/2014    Knee Arthroplasty    OTHER SURGICAL HISTORY  06/17/2020    Colonoscopy     Social History     Tobacco Use    Smoking status: Former     Types: Cigarettes    Smokeless tobacco: Never   Vaping Use    Vaping Use: Never used   Substance Use Topics    Alcohol use: Not Currently    Drug use: Never     Family History   Problem Relation Name Age of Onset    Kidney cancer Mother       No Known Allergies  Current Outpatient Medications   Medication Instructions    acetaminophen (Tylenol) 325 mg tablet 2 tablets, oral, Every 4 hours PRN    amLODIPine (NORVASC) 10 mg, oral, Daily    BD Romana 2nd Gen Pen Needle 32 gauge x 5/32\" needle Use to inject insulin 4 times per day.    cholecalciferol (Vitamin D-3) 25 MCG (1000 UT) tablet 1 tablet, oral, Daily    cyanocobalamin (Vitamin B-12) 1,000 mcg/mL injection 2 mL, intramuscular, per AQ.    ergocalciferol (VITAMIN D-2) 1,250 mcg, oral, Weekly    fenofibrate (TRICOR) 48 mg, oral, Daily, as directed    FreeStyle Roverto 2 Sensor kit Change sensor every 14 days    furosemide (LASIX) 20 mg, oral, Daily PRN    furosemide (LASIX) 40 mg, oral, Daily, 3 DAYS FOR EDEMA THEN HOLD AND IF NEEDED    insulin aspart (NovoLOG Flexpen U-100 Insulin) 100 unit/mL (3 mL) pen Inject 8 units before every meal. Use sliding scale as directed. MDD 40 units.    insulin glargine (Toujeo SoloStar U-300 Insulin) 300 unit/mL (1.5 mL) injection Inject 56 units every morning.    Jardiance 25 mg, oral, Daily    losartan (COZAAR) 100 mg, oral, Daily    metaxalone (SKELAXIN) 800 mg, oral, Once as needed    Mounjaro 10 mg, subcutaneous, " Weekly    omega-3 acid ethyl esters (LOVAZA) 1,000 g, oral, 2 times daily    OneTouch Verio test strips strip 3 times daily    potassium chloride CR 20 mEq ER tablet 20 mEq, oral, Daily, WHILE ON FUROSEMIDE ONLY<BR>    pravastatin (PRAVACHOL) 20 mg, oral, Nightly    sildenafil (Viagra) 50 mg tablet TAKE 1 TABLET DAILY 1 HOUR BEFORE NEEDED    testosterone cypionate (Depo-Testosterone) 200 mg/mL injection 0.4 mL, intramuscular, Weekly       Objective       Physical Exam  Vital Signs: as recorded above  General: Well groomed, well nourished   Orientation:  Alert , oriented to time, place , and person   Mood and Affect:  Cooperative , no apparent distress normal affect  Skin: Good color, good turgor  Eyes: Extra ocular muscle movements intact, anicteric sclerae slightly injected conjunctivae   Neck: Supple, full range of movement  Chest: Normal breath sounds, normal chest wall exam, symmetric, good air entry, clear to auscultation  Heart: Regular rate and rhythm, without murmur, gallop, or rubs  Abdomen soft nontender no masses felt no hepatosplenomegaly, no rebound or guarding  BACK:  no CTLS spine tenderness, no flank tenderness  Extremities: full range of movement  bilateral UE and bilateral LE,  no lower extremity edema  Neurological: Alert, oriented, cranial nerves II-XII intact except for visual acuity  Sensation:  Intact   Gait: normal steady      Assessment/Plan   Franck Mckinley is a 58 y.o. male who presents for the concerns below:    Problem List Items Addressed This Visit    None   BMI  improving waist is down to 42, exercising to keep muscle tone   DIABETES MELLITUS PLAN: improving with current medication regimen  Follow Diabetic diet, will consult nutrition if needed,  exercise as discussed, weight control., before our next visit will obtain HbA1c, BMP, urine microalbumin, ophthalmology exam.  Podiatry checks periodically.     HYPERTENSION PLAN:  , taking blood pressure medication  Follow low salt diet,  exercise as discussed, weight control. Continue current medications    HYPOGONADISM  he is not taking the testosterone shots for a month since his blood counts were up , he has been donating blood to help      ASSESSMENT AND PLAN: Patient on examination is in good health, will do screening blood tests to screen for high cholesterol, diabetes, thyroid, Ekg if above 50 years old or earlier if with cardiac history. Patient should be taking enough calcium in a balanced diet For Male Patients Only:  Prostate cancer screening PSA .Preventive Medicine: colon cancer screening by age 50 if no family history, balanced diet, and exercise as discussed. Seat belt use for injury prevention, living will. Substance use and /or tobacco use counseled when applicable. Alcohol use discussed, use designated . Immunizations TD age 50 and every 10 years. Pneumovax and shingles vaccine counseled. Yearly flu vaccine unless contraindicated. More than 50% of office visit time spent counseling the patient, questions were answered. If problems arise, patient is to call or come back in. It is understood that the responsibility of healthcare is shared by the patient by following a healthy lifestyle and following the plan above as discussed. Complete physical examination in a year.     Discussed with:   Return in : August     Portions of this note were generated using digital voice recognition software, and may contain grammatical errors       Piper Alvarado MD  02/16/24  10:58 AM

## 2024-02-19 ENCOUNTER — OFFICE VISIT (OUTPATIENT)
Dept: UROLOGY | Facility: HOSPITAL | Age: 59
End: 2024-02-19
Payer: COMMERCIAL

## 2024-02-19 DIAGNOSIS — N52.9 ERECTILE DYSFUNCTION, UNSPECIFIED ERECTILE DYSFUNCTION TYPE: ICD-10-CM

## 2024-02-19 DIAGNOSIS — E29.1 HYPOGONADISM IN MALE: ICD-10-CM

## 2024-02-19 DIAGNOSIS — Z71.2 ENCOUNTER TO DISCUSS TEST RESULTS: ICD-10-CM

## 2024-02-19 DIAGNOSIS — Z09 ENCOUNTER FOR FOLLOW-UP: ICD-10-CM

## 2024-02-19 DIAGNOSIS — Z12.5 PROSTATE CANCER SCREENING: ICD-10-CM

## 2024-02-19 DIAGNOSIS — R79.89 LOW TESTOSTERONE IN MALE: Primary | ICD-10-CM

## 2024-02-19 PROCEDURE — 1036F TOBACCO NON-USER: CPT | Performed by: UROLOGY

## 2024-02-19 PROCEDURE — 99214 OFFICE O/P EST MOD 30 MIN: CPT | Performed by: UROLOGY

## 2024-02-19 PROCEDURE — 3008F BODY MASS INDEX DOCD: CPT | Performed by: UROLOGY

## 2024-02-19 PROCEDURE — 4010F ACE/ARB THERAPY RXD/TAKEN: CPT | Performed by: UROLOGY

## 2024-02-19 RX ORDER — TESTOSTERONE CYPIONATE 200 MG/ML
80 INJECTION, SOLUTION INTRAMUSCULAR
Qty: 4 ML | Refills: 3 | Status: SHIPPED | OUTPATIENT
Start: 2024-02-19 | End: 2024-02-19 | Stop reason: SDUPTHER

## 2024-02-19 RX ORDER — SILDENAFIL 100 MG/1
100 TABLET, FILM COATED ORAL AS NEEDED
Qty: 30 TABLET | Refills: 6 | Status: SHIPPED | OUTPATIENT
Start: 2024-02-19 | End: 2024-03-20

## 2024-02-19 RX ORDER — TESTOSTERONE CYPIONATE 200 MG/ML
80 INJECTION, SOLUTION INTRAMUSCULAR
Qty: 4 ML | Refills: 3 | Status: SHIPPED | OUTPATIENT
Start: 2024-02-19 | End: 2024-06-10

## 2024-02-19 NOTE — PROGRESS NOTES
"HPI  58 y.o.  male patient here for concern for hypogonadism.      Last visit 12/18/23:   -Testosterone TC.4 cc weekly, hold for now due to polycythemia.   -Continue sildenafil for now.   -Patient will continue to monitor erections while on testosterone.      Today's visit:   #Hypogonadism   -Currently using TC .4 cc weekly - held due to polycythemia   -feels like he has brain fog, low libido, low energy  -went to donate blood, level now wnl  -felt good on .4cc weekly     -Interested in fertility preservation: No.     HISTORY:   -Previous gynecomastia: none   -Personal or family history of prostate cancer: none   -Previous use of testosterone or anabolic steroids: none  -Mother had kidney cancer, grandmother had diabetes.   -No history of heart attack or stroke.     #Erectile Dysfunction  -Sildenafil 50mg --> working well when taken  -has not not been interested recently   -no s/e         in the 1990's, now works as  at [employer].       Lab Results   Component Value Date    TESTOSTERONE 110 (L) 02/16/2024    TESTOSTERONE 368 12/12/2023    TESTOSTERONE 733 07/21/2023     Lab Results   Component Value Date    LH 3.0 02/16/2024     Lab Results   Component Value Date    ESTRADIOL 20 02/16/2024     Lab Results   Component Value Date    PSA 0.71 02/16/2024     Lab Results   Component Value Date    HCT 49.9 02/16/2024       Current Medications:  Current Outpatient Medications   Medication Sig Dispense Refill    acetaminophen (Tylenol) 325 mg tablet Take 2 tablets (650 mg) by mouth every 4 hours if needed (pain).      amLODIPine (Norvasc) 10 mg tablet TAKE 1 TABLET DAILY 90 tablet 0    BD Romana 2nd Gen Pen Needle 32 gauge x 5/32\" needle Use to inject insulin 4 times per day. 400 each 3    cholecalciferol (Vitamin D-3) 25 MCG (1000 UT) tablet Take 1 tablet (25 mcg) by mouth once daily.      cyanocobalamin (Vitamin B-12) 1,000 mcg/mL injection Inject 2 mL (2,000 mcg) into the muscle. per " AQ.      ergocalciferol (Vitamin D-2) 1.25 MG (41277 UT) capsule TAKE 1 CAPSULE BY MOUTH ONE TIME PER WEEK 4 capsule 2    fenofibrate (Tricor) 48 mg tablet TAKE 1 TABLET DAILY AS     DIRECTED 90 tablet 2    FreeStyle Roverto 2 Sensor kit Change sensor every 14 days 6 each 3    furosemide (Lasix) 20 mg tablet Take 1 tablet (20 mg) by mouth once daily as needed (leg edema).      furosemide (Lasix) 40 mg tablet Take 1 tablet (40 mg) by mouth once daily. 3 DAYS FOR EDEMA THEN HOLD AND IF NEEDED      insulin aspart (NovoLOG Flexpen U-100 Insulin) 100 unit/mL (3 mL) pen Inject 8 units before every meal. Use sliding scale as directed. MDD 40 units. 45 mL 1    insulin glargine (Toujeo SoloStar U-300 Insulin) 300 unit/mL (1.5 mL) injection Inject 56 units every morning. 18 mL 1    Jardiance 25 mg Take 1 tablet (25 mg) by mouth once daily. 90 tablet 3    losartan (Cozaar) 100 mg tablet TAKE 1 TABLET DAILY 90 tablet 2    metaxalone (Skelaxin) 800 mg tablet Take 1 tablet (800 mg) by mouth 1 time if needed for muscle spasms (at bedtime) for up to 20 doses. (Patient not taking: Reported on 2/16/2024) 20 tablet 0    omega-3 acid ethyl esters (Lovaza) 1 gram capsule Take 1,000 capsules (1,000 g) by mouth 2 times a day.      OneTouch Verio test strips strip 3 times a day.      potassium chloride CR 20 mEq ER tablet Take 1 tablet (20 mEq) by mouth once daily. WHILE ON FUROSEMIDE ONLY      pravastatin (Pravachol) 20 mg tablet Take 1 tablet (20 mg) by mouth once daily at bedtime. 90 tablet 3    sildenafil (Viagra) 50 mg tablet TAKE 1 TABLET DAILY 1 HOUR BEFORE NEEDED 30 tablet 0    testosterone cypionate (Depo-Testosterone) 200 mg/mL injection Inject 0.4 mL (80 mg) into the muscle 1 (one) time per week.      tirzepatide (Mounjaro) 10 mg/0.5 mL pen injector Inject 10 mg under the skin 1 (one) time per week. 6 mL 1     Current Facility-Administered Medications   Medication Dose Route Frequency Provider Last Rate Last Admin     cyanocobalamin (Vitamin B-12) injection 1,000 mcg  1,000 mcg intramuscular Once Piper Alvarado MD        cyanocobalamin (Vitamin B-12) injection 1,000 mcg  1,000 mcg intramuscular Once Piper Alvarado MD            PMH:  Past Medical History:   Diagnosis Date    Abnormal finding of blood chemistry, unspecified 06/21/2017    Abnormal blood chemistry    Acute pharyngitis, unspecified 11/11/2019    Pharyngitis    Acute pharyngitis, unspecified 08/18/2020    Sore throat    Anosmia 04/19/2021    Anosmia    Calculus of kidney 01/04/2021    Kidney stone on right side    Cellulitis, unspecified 12/21/2022    Cellulitis    Chronic sinusitis, unspecified 04/26/2018    Sinusitis    Cough, unspecified 04/19/2021    Cough    Disorder of kidney and ureter, unspecified 11/14/2022    Renal insufficiency    Dorsalgia, unspecified 12/22/2020    Back pain    Edema, unspecified 11/16/2022    Edema    Encounter for screening for malignant neoplasm of colon 02/11/2020    Colon cancer screening    Encounter for screening for malignant neoplasm of prostate 01/19/2022    Screening for prostate cancer    Encounter for screening for malignant neoplasm of prostate 05/19/2020    Encounter for prostate cancer screening    Essential (primary) hypertension 11/16/2022    Benign essential hypertension    Fever, unspecified 04/19/2021    Fever    Follicular cyst of the skin and subcutaneous tissue, unspecified 11/11/2019    Infected cyst of skin    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    Low HDL (under 40)    Liver disease, unspecified 01/27/2021    Liver lesion    Male erectile dysfunction, unspecified 02/07/2020    Erectile dysfunction    Myalgia, unspecified site 08/18/2020    Myalgia    Other chest pain 01/19/2022    Chest discomfort    Other conditions influencing health status 01/19/2022    Skin tear    Other fatigue 03/21/2019    Fatigue    Other malaise 08/18/2020    Malaise and fatigue    Other nonspecific  abnormal finding of lung field 02/10/2021    Abnormal CT scan, lung    Pain in right knee 06/26/2020    Right knee pain    Pain in right shoulder 03/30/2022    Right shoulder pain    Proteinuria, unspecified 11/14/2022    Proteinuria    Proteinuria, unspecified 11/13/2015    Proteinuria    Type 2 diabetes mellitus without complications (CMS/[facility]) 12/22/2022    Diabetes mellitus    Unspecified abdominal pain 05/26/2022    Abdominal pain    Unspecified open wound of unspecified toe(s) without damage to nail, initial encounter 03/16/2020    Wound, open, toe       PSH:  Past Surgical History:   Procedure Laterality Date    AMPUTATION Left     left 2nd toe    HAND SURGERY  04/04/2014    Hand Surgery                                                                                                                                                          KNEE ARTHROPLASTY  04/04/2014    Knee Arthroplasty    OTHER SURGICAL HISTORY  06/17/2020    Colonoscopy       FMH:  Family History   Problem Relation Name Age of Onset    Kidney cancer Mother      No Known Problems Daughter      No Known Problems Daughter         SHx:  Social History     Tobacco Use    Smoking status: Former     Types: Cigarettes    Smokeless tobacco: Never   Vaping Use    Vaping Use: Never used   Substance Use Topics    Alcohol use: Not Currently    Drug use: Never       Allergies:  No Known Allergies    Physical Exam   CONSTITUTIONAL:        No acute distress    HEAD:        Normocephalic and atraumatic    CHEST / RESPIRATORY      no excess work of breathing, no respiratory distress,    ABDOMEN / GASTROINTESTINAL:        Abdomen nondistended      Assessment/Plan  #Hypogonadism   -Re-start Testosterone TC.4 cc weekly     #Erectile Dysfunction  -Continue sildenafil, refilled today.      #polycythemia       -Patient will go donate blood once a month.     #Screening for hyperestrogenemia   -E2   -arimidex if E is elevated      #Prostate cancer screening   -PSA.       #Fertility preservation   -Not interested, done having children.      fu in 3 months with low T labs.     Scribe Attestation  By signing my name below, I, Danika Serna-Zaida Pal   attest that this documentation has been prepared under the direction and in the presence of Paris Rosales MD.

## 2024-02-21 RX ORDER — CYANOCOBALAMIN 1000 UG/ML
1000 INJECTION, SOLUTION INTRAMUSCULAR; SUBCUTANEOUS ONCE
Status: DISCONTINUED | OUTPATIENT
Start: 2024-02-21 | End: 2024-05-28

## 2024-02-28 ENCOUNTER — APPOINTMENT (OUTPATIENT)
Dept: DERMATOLOGY | Facility: CLINIC | Age: 59
End: 2024-02-28
Payer: COMMERCIAL

## 2024-03-17 DIAGNOSIS — I10 BENIGN ESSENTIAL HYPERTENSION: ICD-10-CM

## 2024-03-18 RX ORDER — AMLODIPINE BESYLATE 10 MG/1
10 TABLET ORAL DAILY
Qty: 90 TABLET | Refills: 1 | Status: SHIPPED | OUTPATIENT
Start: 2024-03-18

## 2024-03-22 ENCOUNTER — CLINICAL SUPPORT (OUTPATIENT)
Dept: PRIMARY CARE | Facility: CLINIC | Age: 59
End: 2024-03-22
Payer: COMMERCIAL

## 2024-03-22 DIAGNOSIS — E55.9 VITAMIN D DEFICIENCY: ICD-10-CM

## 2024-03-22 DIAGNOSIS — E53.8 VITAMIN B12 DEFICIENCY: Primary | ICD-10-CM

## 2024-03-22 PROCEDURE — 96372 THER/PROPH/DIAG INJ SC/IM: CPT | Performed by: INTERNAL MEDICINE

## 2024-03-22 RX ORDER — CYANOCOBALAMIN 1000 UG/ML
1000 INJECTION, SOLUTION INTRAMUSCULAR; SUBCUTANEOUS ONCE
Status: COMPLETED | OUTPATIENT
Start: 2024-03-22 | End: 2024-03-22

## 2024-03-22 RX ORDER — CYANOCOBALAMIN 1000 UG/ML
1000 INJECTION, SOLUTION INTRAMUSCULAR; SUBCUTANEOUS ONCE
Status: CANCELLED | OUTPATIENT
Start: 2024-03-22 | End: 2024-03-22

## 2024-03-22 RX ADMIN — CYANOCOBALAMIN 1000 MCG: 1000 INJECTION, SOLUTION INTRAMUSCULAR; SUBCUTANEOUS at 10:11

## 2024-04-02 ENCOUNTER — TELEPHONE (OUTPATIENT)
Dept: ENDOCRINOLOGY | Facility: CLINIC | Age: 59
End: 2024-04-02
Payer: COMMERCIAL

## 2024-04-02 DIAGNOSIS — Z79.4 TYPE 2 DIABETES MELLITUS WITH HYPERGLYCEMIA, WITH LONG-TERM CURRENT USE OF INSULIN (MULTI): Primary | ICD-10-CM

## 2024-04-02 DIAGNOSIS — E11.65 TYPE 2 DIABETES MELLITUS WITH HYPERGLYCEMIA, WITH LONG-TERM CURRENT USE OF INSULIN (MULTI): Primary | ICD-10-CM

## 2024-04-02 RX ORDER — SEMAGLUTIDE 1.34 MG/ML
1 INJECTION, SOLUTION SUBCUTANEOUS
Qty: 3 ML | Refills: 2 | Status: SHIPPED | OUTPATIENT
Start: 2024-04-02

## 2024-04-02 NOTE — TELEPHONE ENCOUNTER
Notice from [facility] that they do not have Mounjaro 10mg, 12.5mg or 15mg in stock. They are asking for an alternative to be Rxd

## 2024-04-23 DIAGNOSIS — E55.9 VITAMIN D DEFICIENCY: ICD-10-CM

## 2024-04-23 RX ORDER — ERGOCALCIFEROL 1.25 MG/1
1 CAPSULE ORAL
Qty: 4 CAPSULE | Refills: 2 | Status: SHIPPED | OUTPATIENT
Start: 2024-04-23

## 2024-04-26 ENCOUNTER — CLINICAL SUPPORT (OUTPATIENT)
Dept: PRIMARY CARE | Facility: CLINIC | Age: 59
End: 2024-04-26
Payer: COMMERCIAL

## 2024-04-26 ENCOUNTER — TELEPHONE (OUTPATIENT)
Dept: ENDOCRINOLOGY | Facility: CLINIC | Age: 59
End: 2024-04-26

## 2024-04-26 DIAGNOSIS — R43.0 ANOSMIA: ICD-10-CM

## 2024-04-26 DIAGNOSIS — D51.9 ANEMIA DUE TO VITAMIN B12 DEFICIENCY, UNSPECIFIED B12 DEFICIENCY TYPE: Primary | ICD-10-CM

## 2024-04-26 PROCEDURE — 96372 THER/PROPH/DIAG INJ SC/IM: CPT | Performed by: INTERNAL MEDICINE

## 2024-04-26 RX ORDER — CYANOCOBALAMIN 1000 UG/ML
1000 INJECTION, SOLUTION INTRAMUSCULAR; SUBCUTANEOUS ONCE
Status: COMPLETED | OUTPATIENT
Start: 2024-04-26 | End: 2024-04-26

## 2024-04-26 RX ADMIN — CYANOCOBALAMIN 1000 MCG: 1000 INJECTION, SOLUTION INTRAMUSCULAR; SUBCUTANEOUS at 10:07

## 2024-04-26 RX ADMIN — CYANOCOBALAMIN 1000 MCG: 1000 INJECTION, SOLUTION INTRAMUSCULAR; SUBCUTANEOUS at 10:09

## 2024-04-26 NOTE — PROGRESS NOTES
Follow up for diabetes. LV with me 01/2024 ([facility] notes did not transfer into [facility])     History of Present Illness    58 y.o. male with hx of type 2 diabetes, obesity class II, HTN, erectile dysfunction.    Dx: 2015  HbA1c: 7.5% (5/2/2024), 8.1% (1/16/2024), 8.3 (03/2023), 8.3% (12/2022)  Current regimen: Jardiance 25 mg QDAY, Toujeo 56 units QAM, Novolog 8 units BIDAC + SS#2, Mounjaro 10 mg/week (Ozempic 1 mg/week)  Past medications: metformin, glipizide, Trulicity  Complications: diabetic foot ulcer, nephropathy  Comorbidities: HTN, obesity    SMBG: Roverto 2    Hypoglycemia: no    Diet:  Breakfast (7:30 am)-Wasa crackers with peanut butter and jelly, roasted salmon and veggies, black coffee, hard boiled eggs, olives  Lunch-typically doesn't eat lunch  Dinner (6-8 pm)-salad, roasted chicken or pork, beef or fish, roasted or steamed vegetables, occasional rice or noodles  Snacks-popcorn, fruit (cantaloup)  Drinks: water with Crystal Light packets, black coffee, one can diet pepsi per week    Exercise: doing band exercises     ROS  General: no fever or chills  CV: no chest pain   Respiratory: no shortness of breath  MSK: no lower extremity edema  Neuro: no headache or dizziness  See HPI for Endocrine ROS    Past Medical History:   Diagnosis Date    Abnormal finding of blood chemistry, unspecified 06/21/2017    Abnormal blood chemistry    Acute pharyngitis, unspecified 11/11/2019    Pharyngitis    Acute pharyngitis, unspecified 08/18/2020    Sore throat    Anosmia 04/19/2021    Anosmia    Calculus of kidney 01/04/2021    Kidney stone on right side    Cellulitis, unspecified 12/21/2022    Cellulitis    Chronic sinusitis, unspecified 04/26/2018    Sinusitis    Cough, unspecified 04/19/2021    Cough    Disorder of kidney and ureter, unspecified 11/14/2022    Renal insufficiency    Dorsalgia, unspecified 12/22/2020    Back pain    Edema, unspecified 11/16/2022    Edema    Encounter for screening for malignant neoplasm of  colon 02/11/2020    Colon cancer screening    Encounter for screening for malignant neoplasm of prostate 01/19/2022    Screening for prostate cancer    Encounter for screening for malignant neoplasm of prostate 05/19/2020    Encounter for prostate cancer screening    Essential (primary) hypertension 11/16/2022    Benign essential hypertension    Fever, unspecified 04/19/2021    Fever    Follicular cyst of the skin and subcutaneous tissue, unspecified 11/11/2019    Infected cyst of skin    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    Low HDL (under 40)    Liver disease, unspecified 01/27/2021    Liver lesion    Male erectile dysfunction, unspecified 02/07/2020    Erectile dysfunction    Myalgia, unspecified site 08/18/2020    Myalgia    Other chest pain 01/19/2022    Chest discomfort    Other conditions influencing health status 01/19/2022    Skin tear    Other fatigue 03/21/2019    Fatigue    Other malaise 08/18/2020    Malaise and fatigue    Other nonspecific abnormal finding of lung field 02/10/2021    Abnormal CT scan, lung    Pain in right knee 06/26/2020    Right knee pain    Pain in right shoulder 03/30/2022    Right shoulder pain    Proteinuria, unspecified 11/14/2022    Proteinuria    Proteinuria, unspecified 11/13/2015    Proteinuria    Type 2 diabetes mellitus without complications ([facility]) 12/22/2022    Diabetes mellitus    Unspecified abdominal pain 05/26/2022    Abdominal pain    Unspecified open wound of unspecified toe(s) without damage to nail, initial encounter 03/16/2020    Wound, open, toe       Past Surgical History:   Procedure Laterality Date    AMPUTATION Left     left 2nd toe    HAND SURGERY  04/04/2014    Hand Surgery                                                                                                                                                          KNEE ARTHROPLASTY  04/04/2014    Knee Arthroplasty    OTHER SURGICAL HISTORY  06/17/2020    Colonoscopy       Social History  "    Socioeconomic History    Marital status:      Spouse name: Not on file    Number of children: Not on file    Years of education: Not on file    Highest education level: Not on file   Occupational History    Not on file   Tobacco Use    Smoking status: Former     Types: Cigarettes    Smokeless tobacco: Never   Vaping Use    Vaping status: Never Used   Substance and Sexual Activity    Alcohol use: Not Currently    Drug use: Never    Sexual activity: Not on file   Other Topics Concern    Not on file   Social History Narrative    Not on file     Social Determinants of Health     Financial Resource Strain: Not on file   Food Insecurity: Not on file   Transportation Needs: Not on file   Physical Activity: Not on file   Stress: Not on file   Social Connections: Not on file   Intimate Partner Violence: Not on file   Housing Stability: Not on file       Physical Exam   vitals were not taken for this visit.   General: not in acute distress  HEENT: DON ALDANA  Thyroid: no goiter  Neuro: alert and oriented x 3    Current Outpatient Medications   Medication Sig Dispense Refill    acetaminophen (Tylenol) 325 mg tablet Take 2 tablets (650 mg) by mouth every 4 hours if needed (pain).      amLODIPine (Norvasc) 10 mg tablet TAKE 1 TABLET DAILY 90 tablet 1    BD Romana 2nd Gen Pen Needle 32 gauge x 5/32\" needle Use to inject insulin 4 times per day. 400 each 3    cholecalciferol (Vitamin D-3) 25 MCG (1000 UT) tablet Take 1 tablet (25 mcg) by mouth once daily.      cyanocobalamin (Vitamin B-12) 1,000 mcg/mL injection Inject 2 mL (2,000 mcg) into the muscle. per AQ.      ergocalciferol (Vitamin D-2) 1.25 MG (18504 UT) capsule TAKE 1 CAPSULE BY MOUTH ONE TIME PER WEEK 4 capsule 2    fenofibrate (Tricor) 48 mg tablet TAKE 1 TABLET DAILY AS     DIRECTED 90 tablet 2    FreeStyle Roverto 2 Sensor kit Change sensor every 14 days 6 each 3    furosemide (Lasix) 20 mg tablet Take 1 tablet (20 mg) by mouth once daily as needed (leg edema). "      furosemide (Lasix) 40 mg tablet Take 1 tablet (40 mg) by mouth once daily. 3 DAYS FOR EDEMA THEN HOLD AND IF NEEDED      insulin aspart (NovoLOG Flexpen U-100 Insulin) 100 unit/mL (3 mL) pen Inject 8 units before every meal. Use sliding scale as directed. MDD 40 units. 45 mL 1    insulin glargine (Toujeo SoloStar U-300 Insulin) 300 unit/mL (1.5 mL) injection Inject 56 units every morning. 18 mL 1    Jardiance 25 mg Take 1 tablet (25 mg) by mouth once daily. 90 tablet 3    losartan (Cozaar) 100 mg tablet TAKE 1 TABLET DAILY 90 tablet 2    metaxalone (Skelaxin) 800 mg tablet Take 1 tablet (800 mg) by mouth 1 time if needed for muscle spasms (at bedtime) for up to 20 doses. (Patient not taking: Reported on 2/16/2024) 20 tablet 0    omega-3 acid ethyl esters (Lovaza) 1 gram capsule Take 1,000 capsules (1,000 g) by mouth 2 times a day.      OneTouch Verio test strips strip 3 times a day.      potassium chloride CR 20 mEq ER tablet Take 1 tablet (20 mEq) by mouth once daily. WHILE ON FUROSEMIDE ONLY      pravastatin (Pravachol) 20 mg tablet Take 1 tablet (20 mg) by mouth once daily at bedtime. 90 tablet 3    semaglutide (Ozempic) 1 mg/dose (4 mg/3 mL) pen injector Inject 1 mg under the skin 1 (one) time per week. 3 mL 2    sildenafil (Viagra) 100 mg tablet Take 1 tablet (100 mg) by mouth if needed for erectile dysfunction (Start with half tab. Take 1 hour prior to intercourse on an empty stomach. If you have an erection for over 4 hours, please go to the emergency room.). 30 tablet 6    testosterone cypionate (Depo-Testosterone) 200 mg/mL injection Inject 0.4 mL (80 mg) into the muscle 1 (one) time per week. 4 mL 3    tirzepatide (Mounjaro) 10 mg/0.5 mL pen injector Inject 10 mg under the skin 1 (one) time per week. 6 mL 1     Current Facility-Administered Medications   Medication Dose Route Frequency Provider Last Rate Last Admin    cyanocobalamin (Vitamin B-12) injection 1,000 mcg  1,000 mcg intramuscular Once Ma  Kasie Alvarado MD        cyanocobalamin (Vitamin B-12) injection 1,000 mcg  1,000 mcg intramuscular Once Ma Kasie Alvarado MD        cyanocobalamin (Vitamin B-12) injection 1,000 mcg  1,000 mcg intramuscular Once Ma Kasie Alvarado MD        cyanocobalamin (Vitamin B-12) injection 1,000 mcg  1,000 mcg intramuscular Once Ma Kasie Alvarado MD               *after he started taking Humalog 8 units + SS#2 TIDAC    Assessment and Plan  58 y.o. male with hx of type 2 diabetes, obesity class II, HTN, erectile dysfunction, here for management of diabetes.    1. Type 2 diabetes mellitus  HbA1c: 7.5% (5/2/2024), 8.1% (12/2023), 8.3 (03/2023), 8.3% (12/2022)  Current regimen: Jardiance 25 mg QDAY, Toujeo 56 units QAM, Humalog 8 units BIDAC + SS#2, Mounjaro 10 mg/week (Ozempic 1 mg/week)  Eye exam: no DR (12/2023) goes to [facility] in [place]  Urine microalbumin: 332 ug/mg (12/2023) on SGLT2i since 03/2023  Podiatry: [facility], Dr. Kirill Barksdale, last seen 9/18/23  Lipids: HDL 39, LDL 54,  (12/2023) on fenofibrate    A1c: 7.5% today.  Has some heart burn with Ozempic and starts feeling hungry on day 4 but tolerable.  Taking scheduled doses of Humalog before meals now.  He has not had Roverto sensors for the past 2 months or so. He received notification that he would have to pay $700 for 7 sensors (previously covered by insurance).  Does not have a working glucose meter currently.    Will increase Humalog today.  Will send supplies for glucose meter and will reach out to [facility] regarding his sensors.  There may have been a change in his insurance regarding medical vs pharmacy benefits for his sensors.  Provided patient with Roverto 3 sensor today. He will download the Roverto 3 art and would like to use the 3's if possible going forward.    PLAN:  -continue Mounjaro 10 mg/week (on Ozempic now due to shortage; increase to 2 mg/week)  -continue  Toujeo 56 units QAM   -increase Humalog to 12 units BIDAC + SS #2  -continue Jardiance  -check blood sugars before every meal and bedtime  -prefers 5 mm pen needles  -Roverto 2 through [place]    Follow-up in 4 months

## 2024-05-02 ENCOUNTER — OFFICE VISIT (OUTPATIENT)
Dept: ENDOCRINOLOGY | Facility: CLINIC | Age: 59
End: 2024-05-02
Payer: COMMERCIAL

## 2024-05-02 VITALS
DIASTOLIC BLOOD PRESSURE: 76 MMHG | BODY MASS INDEX: 36.45 KG/M2 | SYSTOLIC BLOOD PRESSURE: 130 MMHG | HEIGHT: 78 IN | RESPIRATION RATE: 22 BRPM | HEART RATE: 74 BPM | TEMPERATURE: 97.8 F | WEIGHT: 315 LBS

## 2024-05-02 DIAGNOSIS — E11.65 TYPE 2 DIABETES MELLITUS WITH HYPERGLYCEMIA, WITH LONG-TERM CURRENT USE OF INSULIN (MULTI): ICD-10-CM

## 2024-05-02 DIAGNOSIS — Z79.4 TYPE 2 DIABETES MELLITUS WITH HYPERGLYCEMIA, WITH LONG-TERM CURRENT USE OF INSULIN (MULTI): ICD-10-CM

## 2024-05-02 LAB — POC HEMOGLOBIN A1C: 7.5 % (ref 4.2–6.5)

## 2024-05-02 PROCEDURE — 3075F SYST BP GE 130 - 139MM HG: CPT | Performed by: STUDENT IN AN ORGANIZED HEALTH CARE EDUCATION/TRAINING PROGRAM

## 2024-05-02 PROCEDURE — 3078F DIAST BP <80 MM HG: CPT | Performed by: STUDENT IN AN ORGANIZED HEALTH CARE EDUCATION/TRAINING PROGRAM

## 2024-05-02 PROCEDURE — 83036 HEMOGLOBIN GLYCOSYLATED A1C: CPT | Performed by: STUDENT IN AN ORGANIZED HEALTH CARE EDUCATION/TRAINING PROGRAM

## 2024-05-02 PROCEDURE — 4010F ACE/ARB THERAPY RXD/TAKEN: CPT | Performed by: STUDENT IN AN ORGANIZED HEALTH CARE EDUCATION/TRAINING PROGRAM

## 2024-05-02 PROCEDURE — 3008F BODY MASS INDEX DOCD: CPT | Performed by: STUDENT IN AN ORGANIZED HEALTH CARE EDUCATION/TRAINING PROGRAM

## 2024-05-02 PROCEDURE — 99215 OFFICE O/P EST HI 40 MIN: CPT | Performed by: STUDENT IN AN ORGANIZED HEALTH CARE EDUCATION/TRAINING PROGRAM

## 2024-05-02 RX ORDER — INSULIN GLARGINE 300 [IU]/ML
INJECTION, SOLUTION SUBCUTANEOUS
Qty: 18 ML | Refills: 1 | Status: SHIPPED | OUTPATIENT
Start: 2024-05-02

## 2024-05-02 RX ORDER — BLOOD SUGAR DIAGNOSTIC
STRIP MISCELLANEOUS
Qty: 400 EACH | Refills: 3 | Status: SHIPPED | OUTPATIENT
Start: 2024-05-02 | End: 2024-05-06

## 2024-05-02 NOTE — TELEPHONE ENCOUNTER
Inquiry sent to [employer] as to why sensors are now being charged at a cost of $100 per sensor.

## 2024-05-06 ENCOUNTER — TELEPHONE (OUTPATIENT)
Dept: ENDOCRINOLOGY | Facility: CLINIC | Age: 59
End: 2024-05-06
Payer: COMMERCIAL

## 2024-05-06 DIAGNOSIS — Z79.4 TYPE 2 DIABETES MELLITUS WITH HYPERGLYCEMIA, WITH LONG-TERM CURRENT USE OF INSULIN (MULTI): Primary | ICD-10-CM

## 2024-05-06 DIAGNOSIS — E11.65 TYPE 2 DIABETES MELLITUS WITH HYPERGLYCEMIA, WITH LONG-TERM CURRENT USE OF INSULIN (MULTI): Primary | ICD-10-CM

## 2024-05-06 RX ORDER — PEN NEEDLE, DIABETIC 30 GX3/16"
NEEDLE, DISPOSABLE MISCELLANEOUS
Qty: 400 EACH | Refills: 3 | Status: SHIPPED | OUTPATIENT
Start: 2024-05-06

## 2024-05-06 NOTE — TELEPHONE ENCOUNTER
[facility] left a Vm on refill line asking for Rx for pen needles to be resent. They state the patient prefers 31gauge needle. Please resend at [facility] Pen Needle [facility] uf [id number]

## 2024-05-17 ENCOUNTER — LAB (OUTPATIENT)
Dept: LAB | Facility: LAB | Age: 59
End: 2024-05-17
Payer: COMMERCIAL

## 2024-05-17 DIAGNOSIS — E29.1 HYPOGONADISM IN MALE: ICD-10-CM

## 2024-05-17 DIAGNOSIS — Z12.5 PROSTATE CANCER SCREENING: ICD-10-CM

## 2024-05-17 LAB
ESTRADIOL SERPL-MCNC: 70 PG/ML
HCT VFR BLD AUTO: 55.4 % (ref 41–52)
LH SERPL-ACNC: <0.1 IU/L
PSA SERPL-MCNC: 0.84 NG/ML
TESTOST SERPL-MCNC: 543 NG/DL (ref 240–1000)

## 2024-05-17 PROCEDURE — 83002 ASSAY OF GONADOTROPIN (LH): CPT

## 2024-05-17 PROCEDURE — 36415 COLL VENOUS BLD VENIPUNCTURE: CPT

## 2024-05-17 PROCEDURE — 85014 HEMATOCRIT: CPT

## 2024-05-17 PROCEDURE — 82670 ASSAY OF TOTAL ESTRADIOL: CPT

## 2024-05-17 PROCEDURE — 84403 ASSAY OF TOTAL TESTOSTERONE: CPT

## 2024-05-17 PROCEDURE — 84153 ASSAY OF PSA TOTAL: CPT

## 2024-05-20 ENCOUNTER — OFFICE VISIT (OUTPATIENT)
Dept: UROLOGY | Facility: HOSPITAL | Age: 59
End: 2024-05-20
Payer: COMMERCIAL

## 2024-05-20 DIAGNOSIS — E29.1 HYPOGONADISM IN MALE: Primary | ICD-10-CM

## 2024-05-20 DIAGNOSIS — Z71.2 ENCOUNTER TO DISCUSS TEST RESULTS: ICD-10-CM

## 2024-05-20 DIAGNOSIS — Z12.5 PROSTATE CANCER SCREENING: ICD-10-CM

## 2024-05-20 DIAGNOSIS — D75.1 POLYCYTHEMIA: ICD-10-CM

## 2024-05-20 DIAGNOSIS — Z09 ENCOUNTER FOR FOLLOW-UP: ICD-10-CM

## 2024-05-20 DIAGNOSIS — N52.9 ERECTILE DYSFUNCTION, UNSPECIFIED ERECTILE DYSFUNCTION TYPE: ICD-10-CM

## 2024-05-20 PROCEDURE — 4010F ACE/ARB THERAPY RXD/TAKEN: CPT | Performed by: UROLOGY

## 2024-05-20 PROCEDURE — 99214 OFFICE O/P EST MOD 30 MIN: CPT | Performed by: UROLOGY

## 2024-05-20 PROCEDURE — 3008F BODY MASS INDEX DOCD: CPT | Performed by: UROLOGY

## 2024-05-20 NOTE — PROGRESS NOTES
HPI  58 y.o.  male patient here for concern for hypogonadism.      Last visit 2/19/24:       -Re-start Testosterone TC.4 cc weekly      -Continue sildenafil, refilled today.   -Patient will go donate blood once a month.     Today's visit:   #Hypogonadism   -Currently using TC .4 cc weekly   -sex drive energy ok    #polycythemia  -donated blood 2 weeks prior to bloodwork  -has been donating blood every 6 weeks  -    -Interested in fertility preservation: No.     HISTORY:   -Previous gynecomastia: none   -Personal or family history of prostate cancer: none   -Previous use of testosterone or anabolic steroids: none  -Mother had kidney cancer, grandmother had diabetes.   -No history of heart attack or stroke.     #Erectile Dysfunction  -Sildenafil 50mg --> not working sufficiently   -  -  -        in the 1990's, now works as  at [employer].       Lab Results   Component Value Date    TESTOSTERONE 543 05/17/2024    TESTOSTERONE 110 (L) 02/16/2024    TESTOSTERONE 368 12/12/2023     Lab Results   Component Value Date    LH <0.1 05/17/2024     Lab Results   Component Value Date    ESTRADIOL 70 05/17/2024     Lab Results   Component Value Date    PSA 0.84 05/17/2024     Lab Results   Component Value Date    HCT 55.4 (H) 05/17/2024       Current Medications:  Current Outpatient Medications   Medication Sig Dispense Refill    acetaminophen (Tylenol) 325 mg tablet Take 2 tablets (650 mg) by mouth every 4 hours if needed (pain).      amLODIPine (Norvasc) 10 mg tablet TAKE 1 TABLET DAILY 90 tablet 1    cholecalciferol (Vitamin D-3) 25 MCG (1000 UT) tablet Take 1 tablet (25 mcg) by mouth once daily.      cyanocobalamin (Vitamin B-12) 1,000 mcg/mL injection Inject 2 mL (2,000 mcg) into the muscle. per AQ.      ergocalciferol (Vitamin D-2) 1.25 MG (23634 UT) capsule TAKE 1 CAPSULE BY MOUTH ONE TIME PER WEEK 4 capsule 2    fenofibrate (Tricor) 48 mg tablet TAKE 1 TABLET DAILY AS     DIRECTED 90  "tablet 2    FreeStyle Roverto 2 Sensor kit Change sensor every 14 days 6 each 3    furosemide (Lasix) 20 mg tablet Take 1 tablet (20 mg) by mouth once daily as needed (leg edema).      furosemide (Lasix) 40 mg tablet Take 1 tablet (40 mg) by mouth once daily. 3 DAYS FOR EDEMA THEN HOLD AND IF NEEDED      insulin aspart (NovoLOG Flexpen U-100 Insulin) 100 unit/mL (3 mL) pen Inject 8 units before every meal. Use sliding scale as directed. MDD 40 units. 45 mL 1    insulin glargine (Toujeo SoloStar U-300 Insulin) 300 unit/mL (1.5 mL) injection Inject 56 units every morning. 18 mL 1    Jardiance 25 mg Take 1 tablet (25 mg) by mouth once daily. 90 tablet 3    losartan (Cozaar) 100 mg tablet TAKE 1 TABLET DAILY 90 tablet 2    metaxalone (Skelaxin) 800 mg tablet Take 1 tablet (800 mg) by mouth 1 time if needed for muscle spasms (at bedtime) for up to 20 doses. (Patient not taking: Reported on 2/16/2024) 20 tablet 0    omega-3 acid ethyl esters (Lovaza) 1 gram capsule Take 1,000 capsules (1,000 g) by mouth 2 times a day.      pen needle, diabetic (BD Ultra-Fine Mini Pen Needle) 31 gauge x 3/16\" needle Use to inject insulin 4 times daily 400 each 3    potassium chloride CR 20 mEq ER tablet Take 1 tablet (20 mEq) by mouth once daily. WHILE ON FUROSEMIDE ONLY      pravastatin (Pravachol) 20 mg tablet Take 1 tablet (20 mg) by mouth once daily at bedtime. 90 tablet 3    semaglutide (Ozempic) 1 mg/dose (4 mg/3 mL) pen injector Inject 1 mg under the skin 1 (one) time per week. 3 mL 2    sildenafil (Viagra) 100 mg tablet Take 1 tablet (100 mg) by mouth if needed for erectile dysfunction (Start with half tab. Take 1 hour prior to intercourse on an empty stomach. If you have an erection for over 4 hours, please go to the emergency room.). 30 tablet 6    testosterone cypionate (Depo-Testosterone) 200 mg/mL injection Inject 0.4 mL (80 mg) into the muscle 1 (one) time per week. 4 mL 3    tirzepatide (Mounjaro) 10 mg/0.5 mL pen injector " Inject 10 mg under the skin 1 (one) time per week. 6 mL 1     Current Facility-Administered Medications   Medication Dose Route Frequency Provider Last Rate Last Admin    cyanocobalamin (Vitamin B-12) injection 1,000 mcg  1,000 mcg intramuscular Once Piper Alvarado MD        cyanocobalamin (Vitamin B-12) injection 1,000 mcg  1,000 mcg intramuscular Once Piper Alvarado MD        cyanocobalamin (Vitamin B-12) injection 1,000 mcg  1,000 mcg intramuscular Once Piper Alvarado MD        cyanocobalamin (Vitamin B-12) injection 1,000 mcg  1,000 mcg intramuscular Once Piper Alvarado MD            PMH:  Past Medical History:   Diagnosis Date    Abnormal finding of blood chemistry, unspecified 06/21/2017    Abnormal blood chemistry    Acute pharyngitis, unspecified 11/11/2019    Pharyngitis    Acute pharyngitis, unspecified 08/18/2020    Sore throat    Anosmia 04/19/2021    Anosmia    Calculus of kidney 01/04/2021    Kidney stone on right side    Cellulitis, unspecified 12/21/2022    Cellulitis    Chronic sinusitis, unspecified 04/26/2018    Sinusitis    Cough, unspecified 04/19/2021    Cough    Disorder of kidney and ureter, unspecified 11/14/2022    Renal insufficiency    Dorsalgia, unspecified 12/22/2020    Back pain    Edema, unspecified 11/16/2022    Edema    Encounter for screening for malignant neoplasm of colon 02/11/2020    Colon cancer screening    Encounter for screening for malignant neoplasm of prostate 01/19/2022    Screening for prostate cancer    Encounter for screening for malignant neoplasm of prostate 05/19/2020    Encounter for prostate cancer screening    Essential (primary) hypertension 11/16/2022    Benign essential hypertension    Fever, unspecified 04/19/2021    Fever    Follicular cyst of the skin and subcutaneous tissue, unspecified 11/11/2019    Infected cyst of skin    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    Low HDL (under 40)    Liver  disease, unspecified 01/27/2021    Liver lesion    Male erectile dysfunction, unspecified 02/07/2020    Erectile dysfunction    Myalgia, unspecified site 08/18/2020    Myalgia    Other chest pain 01/19/2022    Chest discomfort    Other conditions influencing health status 01/19/2022    Skin tear    Other fatigue 03/21/2019    Fatigue    Other malaise 08/18/2020    Malaise and fatigue    Other nonspecific abnormal finding of lung field 02/10/2021    Abnormal CT scan, lung    Pain in right knee 06/26/2020    Right knee pain    Pain in right shoulder 03/30/2022    Right shoulder pain    Proteinuria, unspecified 11/14/2022    Proteinuria    Proteinuria, unspecified 11/13/2015    Proteinuria    Type 2 diabetes mellitus without complications (Multi) 12/22/2022    Diabetes mellitus    Unspecified abdominal pain 05/26/2022    Abdominal pain    Unspecified open wound of unspecified toe(s) without damage to nail, initial encounter 03/16/2020    Wound, open, toe       PSH:  Past Surgical History:   Procedure Laterality Date    AMPUTATION Left     left 2nd toe    HAND SURGERY  04/04/2014    Hand Surgery                                                                                                                                                          KNEE ARTHROPLASTY  04/04/2014    Knee Arthroplasty    OTHER SURGICAL HISTORY  06/17/2020    Colonoscopy       FMH:  Family History   Problem Relation Name Age of Onset    Kidney cancer Mother      No Known Problems Daughter      No Known Problems Daughter         SHx:  Social History     Tobacco Use    Smoking status: Former     Types: Cigarettes    Smokeless tobacco: Never   Vaping Use    Vaping status: Never Used   Substance Use Topics    Alcohol use: Not Currently    Drug use: Never       Allergies:  No Known Allergies    Physical Exam   CONSTITUTIONAL:        No acute distress    HEAD:        Normocephalic and atraumatic    CHEST / RESPIRATORY      no excess work of  breathing, no respiratory distress,    ABDOMEN / GASTROINTESTINAL:        Abdomen nondistended      Assessment/Plan  #Hypogonadism   -hold T for now until polycythemia under control  -will restart T at lower dose, .3cc weekly, once polycythemia improved.     #Erectile Dysfunction  -Continue sildenafil, --> increase to 100mg prn     #polycythemia       -Patient will go donate blood again      -repeat cbc after next blood       -sleep study      #Screening for hyperestrogenemia   -E2   -arimidex if E is elevated      #Prostate cancer screening   -PSA.      #Fertility preservation   -Not interested, done having children.      Fuv in early July G 2211  Visit complexity inherent to evaluation and management (E&M) associated with medical care services that serve as the continuing focal point for all needed health care services and/or with medical care services that are part of ongoing care related to a patient's single, serious condition or a complex condition.     Scribe Attestation  By signing my name below, I, Danika Burnett, Scribe, attest that this documentation  has been prepared under the direction and in the presence of Paris Rosales MD.

## 2024-05-28 ENCOUNTER — CLINICAL SUPPORT (OUTPATIENT)
Dept: PRIMARY CARE | Facility: CLINIC | Age: 59
End: 2024-05-28
Payer: COMMERCIAL

## 2024-05-28 DIAGNOSIS — E53.9 VITAMIN B DEFICIENCY: Primary | ICD-10-CM

## 2024-05-28 PROCEDURE — 96372 THER/PROPH/DIAG INJ SC/IM: CPT | Performed by: INTERNAL MEDICINE

## 2024-05-28 RX ORDER — CYANOCOBALAMIN 1000 UG/ML
1000 INJECTION, SOLUTION INTRAMUSCULAR; SUBCUTANEOUS ONCE
Status: COMPLETED | OUTPATIENT
Start: 2024-05-28 | End: 2024-05-28

## 2024-05-28 RX ADMIN — CYANOCOBALAMIN 1000 MCG: 1000 INJECTION, SOLUTION INTRAMUSCULAR; SUBCUTANEOUS at 08:15

## 2024-05-28 RX ADMIN — CYANOCOBALAMIN 1000 MCG: 1000 INJECTION, SOLUTION INTRAMUSCULAR; SUBCUTANEOUS at 08:16

## 2024-06-28 ENCOUNTER — APPOINTMENT (OUTPATIENT)
Dept: PRIMARY CARE | Facility: CLINIC | Age: 59
End: 2024-06-28
Payer: COMMERCIAL

## 2024-06-28 DIAGNOSIS — E11.65 TYPE 2 DIABETES MELLITUS WITH HYPERGLYCEMIA, WITH LONG-TERM CURRENT USE OF INSULIN (MULTI): ICD-10-CM

## 2024-06-28 DIAGNOSIS — E53.9 VITAMIN B DEFICIENCY: Primary | ICD-10-CM

## 2024-06-28 DIAGNOSIS — Z79.4 TYPE 2 DIABETES MELLITUS WITH HYPERGLYCEMIA, WITH LONG-TERM CURRENT USE OF INSULIN (MULTI): ICD-10-CM

## 2024-06-28 PROCEDURE — 96372 THER/PROPH/DIAG INJ SC/IM: CPT | Performed by: INTERNAL MEDICINE

## 2024-06-28 RX ORDER — INSULIN ASPART 100 [IU]/ML
INJECTION, SOLUTION INTRAVENOUS; SUBCUTANEOUS
Qty: 45 ML | Refills: 0 | Status: SHIPPED | OUTPATIENT
Start: 2024-06-28

## 2024-06-28 RX ORDER — CYANOCOBALAMIN 1000 UG/ML
1000 INJECTION, SOLUTION INTRAMUSCULAR; SUBCUTANEOUS ONCE
Status: COMPLETED | OUTPATIENT
Start: 2024-06-28 | End: 2024-06-28

## 2024-07-09 DIAGNOSIS — E55.9 VITAMIN D DEFICIENCY: ICD-10-CM

## 2024-07-09 RX ORDER — ERGOCALCIFEROL 1.25 MG/1
1 CAPSULE ORAL
Qty: 12 CAPSULE | Refills: 1 | Status: SHIPPED | OUTPATIENT
Start: 2024-07-09

## 2024-07-12 ENCOUNTER — LAB (OUTPATIENT)
Dept: LAB | Facility: LAB | Age: 59
End: 2024-07-12
Payer: COMMERCIAL

## 2024-07-12 DIAGNOSIS — E29.1 HYPOGONADISM IN MALE: ICD-10-CM

## 2024-07-12 DIAGNOSIS — Z12.5 PROSTATE CANCER SCREENING: ICD-10-CM

## 2024-07-12 LAB
ESTRADIOL SERPL-MCNC: 26 PG/ML
HCT VFR BLD AUTO: 51.4 % (ref 41–52)
LH SERPL-ACNC: 2.9 IU/L
PSA SERPL-MCNC: 0.7 NG/ML
TESTOST SERPL-MCNC: 104 NG/DL (ref 240–1000)

## 2024-07-12 PROCEDURE — 36415 COLL VENOUS BLD VENIPUNCTURE: CPT

## 2024-07-12 PROCEDURE — 84153 ASSAY OF PSA TOTAL: CPT

## 2024-07-12 PROCEDURE — 83002 ASSAY OF GONADOTROPIN (LH): CPT

## 2024-07-12 PROCEDURE — 85014 HEMATOCRIT: CPT

## 2024-07-12 PROCEDURE — 82670 ASSAY OF TOTAL ESTRADIOL: CPT

## 2024-07-12 PROCEDURE — 84403 ASSAY OF TOTAL TESTOSTERONE: CPT

## 2024-07-15 ENCOUNTER — OFFICE VISIT (OUTPATIENT)
Dept: UROLOGY | Facility: HOSPITAL | Age: 59
End: 2024-07-15
Payer: COMMERCIAL

## 2024-07-15 DIAGNOSIS — E29.1 HYPOGONADISM IN MALE: Primary | ICD-10-CM

## 2024-07-15 DIAGNOSIS — N52.9 ERECTILE DYSFUNCTION, UNSPECIFIED ERECTILE DYSFUNCTION TYPE: ICD-10-CM

## 2024-07-15 DIAGNOSIS — Z12.5 PROSTATE CANCER SCREENING: ICD-10-CM

## 2024-07-15 DIAGNOSIS — D75.1 POLYCYTHEMIA: ICD-10-CM

## 2024-07-15 PROCEDURE — 99214 OFFICE O/P EST MOD 30 MIN: CPT | Performed by: UROLOGY

## 2024-07-15 PROCEDURE — 3008F BODY MASS INDEX DOCD: CPT | Performed by: UROLOGY

## 2024-07-15 PROCEDURE — 4010F ACE/ARB THERAPY RXD/TAKEN: CPT | Performed by: UROLOGY

## 2024-07-15 PROCEDURE — G2211 COMPLEX E/M VISIT ADD ON: HCPCS | Performed by: UROLOGY

## 2024-07-15 RX ORDER — TESTOSTERONE CYPIONATE 200 MG/ML
50 INJECTION, SOLUTION INTRAMUSCULAR
Qty: 5 ML | Refills: 3 | Status: SHIPPED | OUTPATIENT
Start: 2024-07-15

## 2024-07-15 NOTE — PROGRESS NOTES
"Last visit 5/2024  -T held for polycythemia    Today's visit:  #Hypogonadism   -held T for polycythemia (was on .4cc aneudy)  -feels symptoms for low T   -sex drive energy ok     #polycythemia  -has been donating blood every 6 weeks  -now improved     -Interested in fertility preservation: No.      HISTORY:   -Previous gynecomastia: none   -Personal or family history of prostate cancer: none   -Previous use of testosterone or anabolic steroids: none  -Mother had kidney cancer, grandmother had diabetes.   -No history of heart attack or stroke.      #Erectile Dysfunction  -Sildenafil 50mg --> not working sufficiently   -no drive right now        in the 1990's, now works as  at [employer].       Labs  Lab Results   Component Value Date    TESTOSTERONE 104 (L) 07/12/2024    TESTOSTERONE 543 05/17/2024    TESTOSTERONE 110 (L) 02/16/2024     Lab Results   Component Value Date    LH 2.9 07/12/2024     Lab Results   Component Value Date    FSH 7.1 03/17/2023     No components found for: \"ESTRADIAL\"  Lab Results   Component Value Date    PSA 0.70 07/12/2024     No components found for: \"CBC\"  Lab Results   Component Value Date    PROLACTIN 4.8 03/17/2023     Lab Results   Component Value Date    HGBA1C 7.5 (A) 05/02/2024     No components found for: \"HEMATOCRIT\"    Component      Latest Ref Rng 7/12/2024   HEMATOCRIT      41.0 - 52.0 % 51.4        Medications:    Current Outpatient Medications:     acetaminophen (Tylenol) 325 mg tablet, Take 2 tablets (650 mg) by mouth every 4 hours if needed (pain)., Disp: , Rfl:     amLODIPine (Norvasc) 10 mg tablet, TAKE 1 TABLET DAILY, Disp: 90 tablet, Rfl: 1    cholecalciferol (Vitamin D-3) 25 MCG (1000 UT) tablet, Take 1 tablet (25 mcg) by mouth once daily., Disp: , Rfl:     cyanocobalamin (Vitamin B-12) 1,000 mcg/mL injection, Inject 2 mL (2,000 mcg) into the muscle. per AQ., Disp: , Rfl:     ergocalciferol (Vitamin D-2) 1.25 MG (92297 UT) capsule, " "TAKE 1 CAPSULE BY MOUTH ONE TIME PER WEEK, Disp: 12 capsule, Rfl: 1    fenofibrate (Tricor) 48 mg tablet, TAKE 1 TABLET DAILY AS     DIRECTED, Disp: 90 tablet, Rfl: 2    FreeStyle Roverto 2 Sensor kit, Change sensor every 14 days, Disp: 6 each, Rfl: 3    furosemide (Lasix) 20 mg tablet, Take 1 tablet (20 mg) by mouth once daily as needed (leg edema)., Disp: , Rfl:     furosemide (Lasix) 40 mg tablet, Take 1 tablet (40 mg) by mouth once daily. 3 DAYS FOR EDEMA THEN HOLD AND IF NEEDED, Disp: , Rfl:     insulin aspart (NovoLOG Flexpen U-100 Insulin) 100 unit/mL (3 mL) pen, INJECT 12 UNITS SUBCUTANEOUSLY BEFORE MEALS as DIRECTED. USE SLIDING SCALE AS DIRECTED; MAXIMUM DAILY DOSE 40 UNITS, Disp: 45 mL, Rfl: 0    insulin glargine (Toujeo SoloStar U-300 Insulin) 300 unit/mL (1.5 mL) injection, Inject 56 units every morning., Disp: 18 mL, Rfl: 1    Jardiance 25 mg, Take 1 tablet (25 mg) by mouth once daily., Disp: 90 tablet, Rfl: 3    losartan (Cozaar) 100 mg tablet, TAKE 1 TABLET DAILY, Disp: 90 tablet, Rfl: 2    metaxalone (Skelaxin) 800 mg tablet, Take 1 tablet (800 mg) by mouth 1 time if needed for muscle spasms (at bedtime) for up to 20 doses. (Patient not taking: Reported on 2/16/2024), Disp: 20 tablet, Rfl: 0    omega-3 acid ethyl esters (Lovaza) 1 gram capsule, Take 1,000 capsules (1,000 g) by mouth 2 times a day., Disp: , Rfl:     pen needle, diabetic (BD Ultra-Fine Mini Pen Needle) 31 gauge x 3/16\" needle, Use to inject insulin 4 times daily, Disp: 400 each, Rfl: 3    potassium chloride CR 20 mEq ER tablet, Take 1 tablet (20 mEq) by mouth once daily. WHILE ON FUROSEMIDE ONLY, Disp: , Rfl:     pravastatin (Pravachol) 20 mg tablet, Take 1 tablet (20 mg) by mouth once daily at bedtime., Disp: 90 tablet, Rfl: 3    semaglutide (Ozempic) 1 mg/dose (4 mg/3 mL) pen injector, Inject 1 mg under the skin 1 (one) time per week., Disp: 3 mL, Rfl: 2    sildenafil (Viagra) 100 mg tablet, Take 1 tablet (100 mg) by mouth if needed " for erectile dysfunction (Start with half tab. Take 1 hour prior to intercourse on an empty stomach. If you have an erection for over 4 hours, please go to the emergency room.)., Disp: 30 tablet, Rfl: 6    testosterone cypionate (Depo-Testosterone) 200 mg/mL injection, Inject 0.4 mL (80 mg) into the muscle 1 (one) time per week., Disp: 4 mL, Rfl: 3    tirzepatide (Mounjaro) 10 mg/0.5 mL pen injector, Inject 10 mg under the skin 1 (one) time per week., Disp: 6 mL, Rfl: 1    Allergy:  No Known Allergies     Exam  CONSTITUTIONAL:        No acute distress    HEAD:        Normocephalic and atraumatic    CHEST / RESPIRATORY      no excess work of breathing, no respiratory distress,    ABDOMEN / GASTROINTESTINAL:        Abdomen nondistended          Assessment/Plan  #Hypogonadism   -will restart T at lower dose, .3cc weekly     #Erectile Dysfunction  -Continue sildenafil, --> increase to 100mg prn     #polycythemia       -sleep study  (pending)       -restart T at lower dose     #Screening for hyperestrogenemia   -E2   -arimidex if E is elevated      #Prostate cancer screening   -PSA.      #Fertility preservation   -Not interested, done having children.      Fuv in 2 months with low T fu labs     G 6429  Visit complexity inherent to evaluation and management (E&M) associated with medical care services that serve as the continuing focal point for all needed health care services and/or with medical care services that are part of ongoing care related to a patient's single, serious condition or a complex condition.

## 2024-07-30 ENCOUNTER — APPOINTMENT (OUTPATIENT)
Dept: PRIMARY CARE | Facility: CLINIC | Age: 59
End: 2024-07-30
Payer: COMMERCIAL

## 2024-07-30 DIAGNOSIS — E53.9 VITAMIN B DEFICIENCY: Primary | ICD-10-CM

## 2024-07-30 PROCEDURE — 96372 THER/PROPH/DIAG INJ SC/IM: CPT | Performed by: INTERNAL MEDICINE

## 2024-07-30 RX ORDER — CYANOCOBALAMIN 1000 UG/ML
1000 INJECTION, SOLUTION INTRAMUSCULAR; SUBCUTANEOUS ONCE
Status: COMPLETED | OUTPATIENT
Start: 2024-07-30 | End: 2024-07-30

## 2024-08-14 ENCOUNTER — PHARMACY VISIT (OUTPATIENT)
Dept: PHARMACY | Facility: CLINIC | Age: 59
End: 2024-08-14
Payer: MEDICARE

## 2024-08-14 DIAGNOSIS — E11.65 TYPE 2 DIABETES MELLITUS WITH HYPERGLYCEMIA, WITH LONG-TERM CURRENT USE OF INSULIN (MULTI): ICD-10-CM

## 2024-08-14 DIAGNOSIS — Z79.4 TYPE 2 DIABETES MELLITUS WITH HYPERGLYCEMIA, WITH LONG-TERM CURRENT USE OF INSULIN (MULTI): ICD-10-CM

## 2024-08-14 PROCEDURE — RXMED WILLOW AMBULATORY MEDICATION CHARGE

## 2024-08-14 RX ORDER — TIRZEPATIDE 10 MG/.5ML
10 INJECTION, SOLUTION SUBCUTANEOUS
Qty: 6 ML | Refills: 1 | Status: SHIPPED | OUTPATIENT
Start: 2024-08-18

## 2024-08-16 ENCOUNTER — APPOINTMENT (OUTPATIENT)
Dept: PRIMARY CARE | Facility: CLINIC | Age: 59
End: 2024-08-16
Payer: COMMERCIAL

## 2024-08-16 VITALS — BODY MASS INDEX: 38.37 KG/M2 | SYSTOLIC BLOOD PRESSURE: 134 MMHG | DIASTOLIC BLOOD PRESSURE: 76 MMHG | WEIGHT: 315 LBS

## 2024-08-16 DIAGNOSIS — F98.8 ATTENTION DEFICIT DISORDER, UNSPECIFIED HYPERACTIVITY PRESENCE: ICD-10-CM

## 2024-08-16 DIAGNOSIS — E11.9 TYPE 2 DIABETES MELLITUS WITHOUT COMPLICATION, UNSPECIFIED WHETHER LONG TERM INSULIN USE (MULTI): Primary | ICD-10-CM

## 2024-08-16 PROCEDURE — 4010F ACE/ARB THERAPY RXD/TAKEN: CPT | Performed by: INTERNAL MEDICINE

## 2024-08-16 PROCEDURE — 3078F DIAST BP <80 MM HG: CPT | Performed by: INTERNAL MEDICINE

## 2024-08-16 PROCEDURE — 3075F SYST BP GE 130 - 139MM HG: CPT | Performed by: INTERNAL MEDICINE

## 2024-08-16 PROCEDURE — 1036F TOBACCO NON-USER: CPT | Performed by: INTERNAL MEDICINE

## 2024-08-16 PROCEDURE — 99214 OFFICE O/P EST MOD 30 MIN: CPT | Performed by: INTERNAL MEDICINE

## 2024-08-16 RX ORDER — ATOMOXETINE 10 MG/1
10 CAPSULE ORAL DAILY
Qty: 90 CAPSULE | Refills: 1 | Status: SHIPPED | OUTPATIENT
Start: 2024-08-16 | End: 2025-02-12

## 2024-08-16 ASSESSMENT — COLUMBIA-SUICIDE SEVERITY RATING SCALE - C-SSRS
1. IN THE PAST MONTH, HAVE YOU WISHED YOU WERE DEAD OR WISHED YOU COULD GO TO SLEEP AND NOT WAKE UP?: NO
6. HAVE YOU EVER DONE ANYTHING, STARTED TO DO ANYTHING, OR PREPARED TO DO ANYTHING TO END YOUR LIFE?: NO
2. HAVE YOU ACTUALLY HAD ANY THOUGHTS OF KILLING YOURSELF?: NO

## 2024-08-16 ASSESSMENT — ENCOUNTER SYMPTOMS
LOSS OF SENSATION IN FEET: 0
DEPRESSION: 0
OCCASIONAL FEELINGS OF UNSTEADINESS: 0

## 2024-08-16 ASSESSMENT — PATIENT HEALTH QUESTIONNAIRE - PHQ9
2. FEELING DOWN, DEPRESSED OR HOPELESS: NOT AT ALL
SUM OF ALL RESPONSES TO PHQ9 QUESTIONS 1 AND 2: 0
1. LITTLE INTEREST OR PLEASURE IN DOING THINGS: NOT AT ALL

## 2024-08-16 NOTE — PROGRESS NOTES
Subjective   Patient ID: Franck Mckinley is a 58 y.o. male who presents for FUV.    HPI  Patient in for a visit  Had attended wedding of her niece in [place] outdoors     Review of Systems  General: Denies fever, chills, night sweats,  Eyes: Negative for recent visual changes  Ears, Nose, Throat :  Negative for hearing changes, sinus discomfort  Dermatologic: Negative for new skin conditions, rash  Respiratory: Negative for wheezing, shortness of breath, cough  Cardiovascular: Negative for chest pain, palpitations, or leg swelling  Gastrointestinal: Negative for nausea/vomiting, abdominal pain, changes in bowel habits  Genitourinary Negative for Urinary Incontinence  urgency , frequency, discomfort   Musculoskeletal: see hpi  Neurological: Negative for headaches, dizziness    Previous history  Past Medical History:   Diagnosis Date    Abnormal finding of blood chemistry, unspecified 06/21/2017    Abnormal blood chemistry    Acute pharyngitis, unspecified 11/11/2019    Pharyngitis    Acute pharyngitis, unspecified 08/18/2020    Sore throat    Anosmia 04/19/2021    Anosmia    Calculus of kidney 01/04/2021    Kidney stone on right side    Cellulitis, unspecified 12/21/2022    Cellulitis    Chronic sinusitis, unspecified 04/26/2018    Sinusitis    Cough, unspecified 04/19/2021    Cough    Disorder of kidney and ureter, unspecified 11/14/2022    Renal insufficiency    Dorsalgia, unspecified 12/22/2020    Back pain    Edema, unspecified 11/16/2022    Edema    Encounter for screening for malignant neoplasm of colon 02/11/2020    Colon cancer screening    Encounter for screening for malignant neoplasm of prostate 01/19/2022    Screening for prostate cancer    Encounter for screening for malignant neoplasm of prostate 05/19/2020    Encounter for prostate cancer screening    Essential (primary) hypertension 11/16/2022    Benign essential hypertension    Fever, unspecified 04/19/2021    Fever    Follicular cyst of the skin  and subcutaneous tissue, unspecified 11/11/2019    Infected cyst of skin    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    Low HDL (under 40)    Liver disease, unspecified 01/27/2021    Liver lesion    Male erectile dysfunction, unspecified 02/07/2020    Erectile dysfunction    Myalgia, unspecified site 08/18/2020    Myalgia    Other chest pain 01/19/2022    Chest discomfort    Other conditions influencing health status 01/19/2022    Skin tear    Other fatigue 03/21/2019    Fatigue    Other malaise 08/18/2020    Malaise and fatigue    Other nonspecific abnormal finding of lung field 02/10/2021    Abnormal CT scan, lung    Pain in right knee 06/26/2020    Right knee pain    Pain in right shoulder 03/30/2022    Right shoulder pain    Proteinuria, unspecified 11/14/2022    Proteinuria    Proteinuria, unspecified 11/13/2015    Proteinuria    Type 2 diabetes mellitus without complications (Multi) 12/22/2022    Diabetes mellitus    Unspecified abdominal pain 05/26/2022    Abdominal pain    Unspecified open wound of unspecified toe(s) without damage to nail, initial encounter 03/16/2020    Wound, open, toe     Past Surgical History:   Procedure Laterality Date    AMPUTATION Left     left 2nd toe    HAND SURGERY  04/04/2014    Hand Surgery                                                                                                                                                          KNEE ARTHROPLASTY  04/04/2014    Knee Arthroplasty    OTHER SURGICAL HISTORY  06/17/2020    Colonoscopy     Social History     Tobacco Use    Smoking status: Former     Types: Cigarettes    Smokeless tobacco: Never   Vaping Use    Vaping status: Never Used   Substance Use Topics    Alcohol use: Not Currently    Drug use: Never     Family History   Problem Relation Name Age of Onset    Kidney cancer Mother      No Known Problems Daughter      No Known Problems Daughter       No Known Allergies  Current Outpatient Medications   Medication  "Instructions    acetaminophen (Tylenol) 325 mg tablet 2 tablets, oral, Every 4 hours PRN    amLODIPine (NORVASC) 10 mg, oral, Daily    cholecalciferol (Vitamin D-3) 25 MCG (1000 UT) tablet 1 tablet, oral, Daily    cyanocobalamin (Vitamin B-12) 1,000 mcg/mL injection 2 mL, intramuscular, per AQ.    ergocalciferol (VITAMIN D-2) 1,250 mcg, oral, Once Weekly    fenofibrate (TRICOR) 48 mg, oral, Daily, as directed    FreeStyle Roverto 2 Sensor kit Change sensor every 14 days    insulin aspart (NovoLOG Flexpen U-100 Insulin) 100 unit/mL (3 mL) pen INJECT 12 UNITS SUBCUTANEOUSLY BEFORE MEALS as DIRECTED. USE SLIDING SCALE AS DIRECTED; MAXIMUM DAILY DOSE 40 UNITS    insulin glargine (Toujeo SoloStar U-300 Insulin) 300 unit/mL (1.5 mL) injection Inject 56 units every morning.    Jardiance 25 mg, oral, Daily    losartan (COZAAR) 100 mg, oral, Daily    metaxalone (SKELAXIN) 800 mg, oral, Once as needed    [START ON 8/18/2024] Mounjaro 10 mg, subcutaneous, Once Weekly    omega-3 acid ethyl esters (LOVAZA) 1,000 g, oral, 2 times daily    pen needle, diabetic (BD Ultra-Fine Mini Pen Needle) 31 gauge x 3/16\" needle Use to inject insulin 4 times daily    pravastatin (PRAVACHOL) 20 mg, oral, Nightly    sildenafil (VIAGRA) 100 mg, oral, As needed    testosterone cypionate (DEPO-TESTOSTERONE) 50 mg, intramuscular, Once Weekly       Objective       Physical Exam  Vital Signs: as recorded above  General: Well groomed, well nourished   Orientation:  Alert , oriented to time, place , and person   Mood and Affect:  Cooperative , no apparent distress normal affect  Skin: Good color, good turgor  Eyes: Extra ocular muscle movements intact, anicteric sclerae  Neck: Supple, full range of movement  Chest: Normal breath sounds, normal chest wall exam, symmetric, good air entry, clear to auscultation  Heart: Regular rate and rhythm, without murmur, gallop, or rubs  BACK:  no CTLS spine tenderness, no flank tenderness  Extremities: full range of " movement  bilateral UE and bilateral LE,  no lower extremity edema  Neurological: Alert, oriented, cranial nerves II-XII grossly intact except for visual acuity  Sensation:  Intact   Gait: normal steady      Assessment/Plan   Franck Mckinley is a 58 y.o. male who presents for the concerns below:    Problem List Items Addressed This Visit    None    DIABETES MELLITUS PLAN:Stable  utd with endo Dr Domínguez switched bck to Mounjaro had some eye spots with Ozempic with current medication regimen  Follow Diabetic diet, will consult nutrition if needed,  exercise as discussed, weight control., before our next visit will obtain HbA1c, BMP, urine microalbumin, ophthalmology exam.  Need Podiatry checks periodically.    Eye changes if persists despite taking a week of medication ozempic / and or  mounjaro , he will see his eye doctr    Testosterone deficiency urology had him stop for a month and now on a low dose this week    ADD would like to restart strattera his work is sufferring lost concentration   10 mgwill take it like in the past 2-3 times a week bp is checked with other specialist visit     B12 shots due end of the month  Discussed with:   Return in :  3 months     Portions of this note were generated using digital voice recognition software, and may contain grammatical errors       Piper Alvarado MD  08/16/24  10:57 AM

## 2024-08-19 ENCOUNTER — TELEPHONE (OUTPATIENT)
Dept: PRIMARY CARE | Facility: CLINIC | Age: 59
End: 2024-08-19
Payer: COMMERCIAL

## 2024-08-20 PROBLEM — E78.00 PURE HYPERCHOLESTEROLEMIA: Status: ACTIVE | Noted: 2019-01-20

## 2024-08-20 PROBLEM — R91.8 ABNORMAL FINDINGS ON DIAGNOSTIC IMAGING OF LUNG: Status: ACTIVE | Noted: 2024-08-20

## 2024-08-20 PROBLEM — I77.9 PERIPHERAL ARTERIAL OCCLUSIVE DISEASE (CMS-HCC): Status: ACTIVE | Noted: 2024-08-20

## 2024-08-22 ENCOUNTER — OFFICE VISIT (OUTPATIENT)
Dept: SLEEP MEDICINE | Facility: CLINIC | Age: 59
End: 2024-08-22
Payer: COMMERCIAL

## 2024-08-22 VITALS
WEIGHT: 315 LBS | BODY MASS INDEX: 38.9 KG/M2 | HEART RATE: 74 BPM | OXYGEN SATURATION: 97 % | SYSTOLIC BLOOD PRESSURE: 144 MMHG | DIASTOLIC BLOOD PRESSURE: 83 MMHG

## 2024-08-22 DIAGNOSIS — R29.818 SUSPECTED SLEEP APNEA: Primary | ICD-10-CM

## 2024-08-22 DIAGNOSIS — Z12.5 PROSTATE CANCER SCREENING: ICD-10-CM

## 2024-08-22 DIAGNOSIS — N52.9 ERECTILE DYSFUNCTION, UNSPECIFIED ERECTILE DYSFUNCTION TYPE: ICD-10-CM

## 2024-08-22 DIAGNOSIS — D75.1 POLYCYTHEMIA: ICD-10-CM

## 2024-08-22 DIAGNOSIS — E29.1 HYPOGONADISM IN MALE: ICD-10-CM

## 2024-08-22 PROCEDURE — 1036F TOBACCO NON-USER: CPT | Performed by: NURSE PRACTITIONER

## 2024-08-22 PROCEDURE — 4010F ACE/ARB THERAPY RXD/TAKEN: CPT | Performed by: NURSE PRACTITIONER

## 2024-08-22 PROCEDURE — G2211 COMPLEX E/M VISIT ADD ON: HCPCS | Performed by: NURSE PRACTITIONER

## 2024-08-22 PROCEDURE — 3077F SYST BP >= 140 MM HG: CPT | Performed by: NURSE PRACTITIONER

## 2024-08-22 PROCEDURE — 99214 OFFICE O/P EST MOD 30 MIN: CPT | Performed by: NURSE PRACTITIONER

## 2024-08-22 PROCEDURE — 99204 OFFICE O/P NEW MOD 45 MIN: CPT | Performed by: NURSE PRACTITIONER

## 2024-08-22 PROCEDURE — 3079F DIAST BP 80-89 MM HG: CPT | Performed by: NURSE PRACTITIONER

## 2024-08-22 ASSESSMENT — SLEEP AND FATIGUE QUESTIONNAIRES
WORRIED_DISTRESSED_DUE_TO_SLEEP: VERY MUCH NOTICEABLE
ESS-CHAD TOTAL SCORE: 18
DIFFICULTY_FALLING_ASLEEP: MILD
HOW LIKELY ARE YOU TO NOD OFF OR FALL ASLEEP WHILE LYING DOWN TO REST IN THE AFTERNOON WHEN CIRCUMSTANCES PERMIT: HIGH CHANCE OF DOZING
HOW LIKELY ARE YOU TO NOD OFF OR FALL ASLEEP WHILE SITTING AND TALKING TO SOMEONE: WOULD NEVER DOZE
HOW LIKELY ARE YOU TO NOD OFF OR FALL ASLEEP WHILE WATCHING TV: HIGH CHANCE OF DOZING
SLEEP_PROBLEM_INTERFERES_DAILY_ACTIVITIES: VERY MUCH NOTICEABLE
SITING INACTIVE IN A PUBLIC PLACE LIKE A CLASS ROOM OR A MOVIE THEATER: HIGH CHANCE OF DOZING
SATISFACTION_WITH_CURRENT_SLEEP_PATTERN: DISSATISFIED
HOW LIKELY ARE YOU TO NOD OFF OR FALL ASLEEP WHILE SITTING QUIETLY AFTER LUNCH WITHOUT ALCOHOL: WOULD NEVER DOZE
HOW LIKELY ARE YOU TO NOD OFF OR FALL ASLEEP WHEN YOU ARE A PASSENGER IN A CAR FOR AN HOUR WITHOUT A BREAK: HIGH CHANCE OF DOZING
SLEEP_PROBLEM_NOTICEABLE_TO_OTHERS: VERY MUCH NOTICEABLE
HOW LIKELY ARE YOU TO NOD OFF OR FALL ASLEEP WHILE SITTING AND READING: HIGH CHANCE OF DOZING
WAKING_TOO_EARLY: VERY SEVERE
DIFFICULTY_STAYING_ASLEEP: MODERATE
HOW LIKELY ARE YOU TO NOD OFF OR FALL ASLEEP IN A CAR, WHILE STOPPED FOR A FEW MINUTES IN TRAFFIC: HIGH CHANCE OF DOZING

## 2024-08-22 ASSESSMENT — ENCOUNTER SYMPTOMS
DEPRESSION: 0
LOSS OF SENSATION IN FEET: 0
OCCASIONAL FEELINGS OF UNSTEADINESS: 0

## 2024-08-22 ASSESSMENT — PAIN SCALES - GENERAL: PAINLEVEL: 0-NO PAIN

## 2024-08-22 NOTE — PROGRESS NOTES
Patient: Franck Mckinley    [id number]  : 1965 -- AGE 58 y.o.    Provider: SHAMAR Lockett     Location [facility]   Service Date: 2024              [facility]  New Visit Note      HISTORY OF PRESENT ILLNESS     The patient's referring provider is: Paris Rosales,*    HISTORY OF PRESENT ILLNESS   Franck Mckinley is a 58 y.o. male who presents to a [facility] for a sleep medicine evaluation with concerns of polycythemia, low testosterone.    The patient has h/o HTN, low HDL, DM, vitamin D deficiency, liver lesion, ED, renal insufficiency, anemia, polycythemia, ADHD, neuropathy, pulmonary nodules.     Past Sleep History  Patient has not had a sleep study in the past.    Current History    On today's visit, the patient reports snoring, frequent night time awakenings, decreased testosterone and polycythemia.  Was recommended sleep testing per urology for above stated symptoms  Feels sleepy at times during the day. Notes he averages ~ 5 hours of sleep, usually disrupted. Does not obviously snore himself awake.   Has been donating blood every several weeks which has helped his hematocrit. Was elevating when he was on higher dose of testosterone. Stopped taking for a while, and was feeling more poorly. Recently restarted.   Tried OTC mouth guard. States it hurt his teeth, was boil and bite but felt it was pushing his teeth inward. Stopped using.     Sleep schedule  on weekdays / work days:  Usual Bedtime:    10 pm  Falls asleep around:  30 min  # of awakenings:   Wake time:  5:30 am    Naps: none     Sleep schedule on weekends/non work days :  Usual Bedtime:    10:30 pm  Falls asleep around:  11  # of awakenings:   Wake time:  5:30 am    Naps: 30 min     Preferred sleeping position: back and side     Sleep-related ROS:    Problems going to sleep: No problems going to sleep    Problems staying  asleep Problems Staying Asleep: nocturia    Breathing during sleep: snoring and night sweats    Daytime Symptoms  On awakening patient reports: morning dry mouth and morning sore throat    RLS screen:  RLSSCREEN: - Sensations: Patient does not have unusual sensations in their extremities that cause an urge to move them     Sleep-related behaviors:   dreams upon falling asleep    Fatigue:  irritable, memory and concentration deficits, sleepy driving at times     ESS: 18   ORACIO: 22  FOSQ:  22      REVIEW OF SYSTEMS     REVIEW OF SYSTEMS  Review of Systems   All other systems reviewed and are negative.      ALLERGIES AND MEDICATIONS     ALLERGIES  No Known Allergies    MEDICATIONS  Current Outpatient Medications   Medication Sig Dispense Refill    acetaminophen (Tylenol) 325 mg tablet Take 2 tablets (650 mg) by mouth every 4 hours if needed (pain).      amLODIPine (Norvasc) 10 mg tablet TAKE 1 TABLET DAILY 90 tablet 1    atomoxetine (Strattera) 10 mg capsule Take 1 capsule (10 mg) by mouth once daily. Swallow capsule whole; do not open. If opened accidentally, do not touch eyes; wash hands immediately (product is an eye irritant). 90 capsule 1    cholecalciferol (Vitamin D-3) 25 MCG (1000 UT) tablet Take 1 tablet (25 mcg) by mouth once daily.      cyanocobalamin (Vitamin B-12) 1,000 mcg/mL injection Inject 2 mL (2,000 mcg) into the muscle. per AQ.      ergocalciferol (Vitamin D-2) 1.25 MG (73982 UT) capsule TAKE 1 CAPSULE BY MOUTH ONE TIME PER WEEK 12 capsule 1    fenofibrate (Tricor) 48 mg tablet TAKE 1 TABLET DAILY AS     DIRECTED 90 tablet 2    FreeStyle Roverto 2 Sensor kit Change sensor every 14 days 6 each 3    insulin aspart (NovoLOG Flexpen U-100 Insulin) 100 unit/mL (3 mL) pen INJECT 12 UNITS SUBCUTANEOUSLY BEFORE MEALS as DIRECTED. USE SLIDING SCALE AS DIRECTED; MAXIMUM DAILY DOSE 40 UNITS 45 mL 0    insulin glargine (Toujeo SoloStar U-300 Insulin) 300 unit/mL (1.5 mL) injection Inject 56 units every morning. 18  "mL 1    Jardiance 25 mg Take 1 tablet (25 mg) by mouth once daily. 90 tablet 3    losartan (Cozaar) 100 mg tablet TAKE 1 TABLET DAILY 90 tablet 2    omega-3 acid ethyl esters (Lovaza) 1 gram capsule Take 1,000 capsules (1,000 g) by mouth 2 times a day.      pen needle, diabetic (BD Ultra-Fine Mini Pen Needle) 31 gauge x 3/16\" needle Use to inject insulin 4 times daily 400 each 3    pravastatin (Pravachol) 20 mg tablet Take 1 tablet (20 mg) by mouth once daily at bedtime. 90 tablet 3    testosterone cypionate (Depo-Testosterone) 200 mg/mL injection Inject 0.25 mL (50 mg) into the muscle 1 (one) time per week. 5 mL 3    tirzepatide (Mounjaro) 10 mg/0.5 mL pen injector Inject 10 mg under the skin 1 (one) time per week. 6 mL 1    sildenafil (Viagra) 100 mg tablet Take 1 tablet (100 mg) by mouth if needed for erectile dysfunction (Start with half tab. Take 1 hour prior to intercourse on an empty stomach. If you have an erection for over 4 hours, please go to the emergency room.). 30 tablet 6     No current facility-administered medications for this visit.         PAST HISTORY     PAST MEDICAL HISTORY  Past Medical History:   Diagnosis Date    Abnormal finding of blood chemistry, unspecified 06/21/2017    Abnormal blood chemistry    Acute pharyngitis, unspecified 11/11/2019    Pharyngitis    Acute pharyngitis, unspecified 08/18/2020    Sore throat    Anosmia 04/19/2021    Anosmia    Calculus of kidney 01/04/2021    Kidney stone on right side    Cellulitis, unspecified 12/21/2022    Cellulitis    Chronic sinusitis, unspecified 04/26/2018    Sinusitis    Cough, unspecified 04/19/2021    Cough    Disorder of kidney and ureter, unspecified 11/14/2022    Renal insufficiency    Dorsalgia, unspecified 12/22/2020    Back pain    Edema, unspecified 11/16/2022    Edema    Encounter for screening for malignant neoplasm of colon 02/11/2020    Colon cancer screening    Encounter for screening for malignant neoplasm of prostate " 01/19/2022    Screening for prostate cancer    Encounter for screening for malignant neoplasm of prostate 05/19/2020    Encounter for prostate cancer screening    Essential (primary) hypertension 11/16/2022    Benign essential hypertension    Fever, unspecified 04/19/2021    Fever    Follicular cyst of the skin and subcutaneous tissue, unspecified 11/11/2019    Infected cyst of skin    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    Low HDL (under 40)    Liver disease, unspecified 01/27/2021    Liver lesion    Male erectile dysfunction, unspecified 02/07/2020    Erectile dysfunction    Myalgia, unspecified site 08/18/2020    Myalgia    Other chest pain 01/19/2022    Chest discomfort    Other conditions influencing health status 01/19/2022    Skin tear    Other fatigue 03/21/2019    Fatigue    Other malaise 08/18/2020    Malaise and fatigue    Other nonspecific abnormal finding of lung field 02/10/2021    Abnormal CT scan, lung    Pain in right knee 06/26/2020    Right knee pain    Pain in right shoulder 03/30/2022    Right shoulder pain    Proteinuria, unspecified 11/14/2022    Proteinuria    Proteinuria, unspecified 11/13/2015    Proteinuria    Type 2 diabetes mellitus without complications (Multi) 12/22/2022    Diabetes mellitus    Unspecified abdominal pain 05/26/2022    Abdominal pain    Unspecified open wound of unspecified toe(s) without damage to nail, initial encounter 03/16/2020    Wound, open, toe       PAST SURGICAL HISTORY:  Past Surgical History:   Procedure Laterality Date    AMPUTATION Left     left 2nd toe    HAND SURGERY  04/04/2014    Hand Surgery                                                                                                                                                          KNEE ARTHROPLASTY  04/04/2014    Knee Arthroplasty    OTHER SURGICAL HISTORY  06/17/2020    Colonoscopy       FAMILY HISTORY  Family History   Problem Relation Name Age of Onset    Kidney cancer Mother      No Known  Problems Daughter      No Known Problems Daughter         DOES/DOES NOT EC: does not have a family history of sleep disorder.      SOCIAL HISTORY  He  reports that he has quit smoking. His smoking use included cigarettes. He has never used smokeless tobacco. He reports that he does not currently use alcohol. He reports that he does not use drugs. He currently lives with his wife.     Caffeine consumption: 3x day  Alcohol consumption: none  Smoking: none  Marijuana: none    PHYSICAL EXAM     VITAL SIGNS: /83   Pulse 74   Wt (!) 169 kg (372 lb)   SpO2 97%   BMI 38.90 kg/m²      PREVIOUS WEIGHTS:  Wt Readings from Last 3 Encounters:   08/22/24 (!) 169 kg (372 lb)   08/16/24 (!) 166 kg (367 lb)   05/02/24 (!) 168 kg (370 lb)       Physical Exam  Physical Exam   Constitutional: Alert and oriented, cooperative, no obvious distress   HEENT: Non icteric or anemic, EOM WNL bilaterally     Upper Airway Examination:   Modified Mallampati Class: 3  OP Lateral wall narrowing ndgndrndanddndend:nd nd2nd Tonsil Grade: 2+ pink, moist  High arched palate  Tongue Scalloping: Y  Enlarged uvula, slightly erythemic, no exudates  Retrognathia: N  Overjet: N    Neck: Supple, no JVD, no goiter, no adenopathy  Chest: CTA bilaterally, no wheezing, crackles, rubs   Cardiac: RRR, S1 and S2, no murmur, rub, thrill   Abdomen: Obese, Soft, nontender, no masses, no organomegaly   Extremities: No clubbing, no LL edema   Neuromuscular: Cranial nerves grossly intact, no focal deficits      RESULTS/DATA     Bicarbonate   Date Value   07/28/2023 CANCELED   07/27/2023 24 mmol/L   07/26/2023 23 mmol/L       No results found for this or any previous visit from the past 365 days.       Failed to redirect to the Timeline version of the [employer] Smart[employer].        ASSESSMENT/PLAN     Mr. Mckinley is a 58 y.o. male and He was referred to the [facility] for evaluation of suspected sleep apnea      Problem List and Orders  Problem List Items  Addressed This Visit             ICD-10-CM    Erectile dysfunction N52.9    Polycythemia D75.1    Suspected sleep apnea - Primary R29.818     Likely based on H&P today  Discussed testing and treatment options with Franck today. He verbalized understanding and is familiar with CPAP and dental appliance. Also discussed Inspire which he is not interested in currently.  Will proceed with HSAT and plan for treatment pending results. He is agreeable  RTC in Dec to discuss treatment progress. Will contact him with testing results in the meantime and order CPAP v dental pending results/ preference          Relevant Orders    Home sleep apnea test (HSAT)     Other Visit Diagnoses         Codes    Hypogonadism in male     E29.1    Prostate cancer screening     Z12.5

## 2024-08-22 NOTE — ASSESSMENT & PLAN NOTE
Likely based on H&P today  Discussed testing and treatment options with Franck today. He verbalized understanding and is familiar with CPAP and dental appliance. Also discussed Inspire which he is not interested in currently.  Will proceed with HSAT and plan for treatment pending results. He is agreeable  RTC in Dec to discuss treatment progress. Will contact him with testing results in the meantime and order CPAP v dental pending results/ preference

## 2024-08-22 NOTE — PATIENT INSTRUCTIONS
[facility] Sleep [facility]  [facility] [address]  [facility]  [address]  [facility] [address]       NAME: Franck Mckinley   DATE:  08/22/24    DIAGNOSIS:   1. Suspected sleep apnea  Home sleep apnea test (HSAT)      2. Erectile dysfunction, unspecified erectile dysfunction type  Referral to Adult Sleep Medicine      3. Hypogonadism in male  Referral to Adult Sleep Medicine      4. Prostate cancer screening  Referral to Adult Sleep Medicine      5. Polycythemia  Referral to Adult Sleep Medicine          Thank you for coming to the Sleep Medicine Clinic today! Your sleep medicine provider today was: Lula Badillo, CHATA-CNP Below is a summary of your treatment plan, other important information, and our contact numbers:    TREATMENT PLAN:   - Follow-up in 3-4 months.  - If not already done, sign up for 'My Chart' and send prescription requests or messages through this    Scheduling a Sleep Study    Call the  Sleep Testing Center to speak with a sleep testing  to book your overnight sleep study procedure at one of our adult and pediatric-friendly sleep labs. Overnight sleep studies may be scheduled on a weekday or weekend.    We have child life services on a case by case basis at the [facility] location. We also perform daytime testing for shift workers on a case by case basis.    Locations for sleep studies are: [facility], [facility] ([facility]), [facility], [facility], [place], [place].    Bring your usual medications and nightly routine items for your sleep study. In order to fall asleep faster in sleep lab, we advise patients to wake up earlier on the morning of the scheduled testing and avoid napping prior to testing. Sometimes, your provider may prescribe a sleep aid to be taken at lights out in the sleep lab. If you are taking a sleep aid, please have somebody pick you up after the sleep  testing.    Results of your sleep study will be given to the ordering clinician. Please contact their office for results or follow up as directed by your clinician.    For additional information about the sleep medicine services, please call [phone]       Instructions - Common MARGE Recs: - For your sleep apnea, continue to use your PAP every night and use it whenever you are sleeping.   - Avoid alcohol or sedatives several hours prior to sleeping.   - Get additional supplies for your PAP (e.g., mask, hose, filters) every 3 months or as your insurance allows from your [employer] company. Replacement cushions for your PAP mask can be requested monthly if airseals are an issue.  - Remember to clean your mask, tubings, and water chamber regularly as instructed.  - Avoid driving or operating heavy machinery when drowsy. A person driving while sleepy is five (5) times more likely to have an accident. If you feel sleepy, pull over and take a short power nap (sleep for less than 30 minutes). Otherwise, ask somebody to drive you.    EASY WAYS TO IMPROVE YOUR SLEEP:  1. Go to bed and wake up at the same time every day.   Aim for 8 hours but some people need less, some need more.   Get out of bed if you are not sleeping.   Limit naps to 20 min or less.   2. Expose yourself to daylight and/ or bright light in the morning.   Go outside or spend time near a window each morning.   You can use a light box (found on [employer]) if you wake before the sunrise.   Limit light exposure in the evenings (including electronic usage).   Try meditation, reading, stretching, deep breathing, warm shower or bath, or yoga nidra as part of your bedtime routine. There are many great FREE, videos or audio tracks on [employer]/ [employer], etc for guidance.  3. Exercise, in some form, EVERY day, but not too close to bedtime. Consider making this part of your routine at the start of your day, followed by a cool shower.  4. Eat meals at roughly the same time every  day. Make sure you are prioritizing fruits, vegetables, whole grains, lean proteins.  5. Time your caffeine intake. Make sure you are not drinking caffeine within 8 to 12 hours prior to your bedtime.   6. Avoid marijuana, alcohol, and nicotine. They will reduce sleep quality in any quantity.  7. Learn to manage anxiety. Psychology services at  can be reached at [phone] to schedule an appointment.     IMPORTANT INFORMATION:     Call 911 for medical emergencies.  Our offices are generally open from Monday-Friday, 9 am - 5 pm.  If you need to get in touch with me, you may either call me and my team(number is below) or you can use [employer].  If a referral for a test, for CPAP, or for another specialist was made, and you have not heard about scheduling this within a week, please call scheduling at [phone]).  If you are unable to make your appointment for clinic or an overnight study, kindly call the office at least 48 hours in advance to cancel and reschedule.  If you are on CPAP, please bring your device's card to each clinic appointment unless told otherwise by your provider.  There are no supporting services by either the sleep doctors or their staff on weekends and Holidays, or after 5 PM on weekdays.   If you have been asked to come to a sleep study, make sure you bring toiletries, a comfy pillow, and any nighttime medications that you may regularly take. Also be sure to eat dinner before you arrive. We generally do not provide meals.      PRESCRIPTIONS:  We require 7 days advanced notice for prescription refills. If we do not receive the request in this time, we cannot guarantee that your medication will be refilled in time. Please contact the sleep nurses listed below for refills or request via [employer].     IMPORTANT PHONE NUMBERS:   Sleep Medicine Clinic Fax: [phone]  Appointments (for Pediatric Sleep Clinic): [phone] - option 1  Appointments (for Adult Sleep Clinic):  [phone] - option 2  Appointments (For Sleep Studies): [phone] - option 3  Behavioral Sleep Medicine: [phone]  Sleep Surgery: [phone]  ENT (Otolaryngology): [phone]  Headache Clinic (Neurology): [phone]  Neurology: [phone]  Psychiatry: [phone]  Pulmonary Function Testing (PFT) Center: [phone]  Pulmonary Medicine: [phone]  [email or web address] (DME): [phone]  [employer] (DME): [phone]  [facility] (DME): [phone]    Our Adult Sleep Medicine Team (Please do not hesitate to call the office or sleep nurse with any questions between appointments):    Adult Sleep Nurses (Kristie Jeronimo, RN and Mary Cheema RN):  For clinical questions and refilling prescriptions: [phone]  Email sleep diaries and other documents at: [email or web address]    Adult Sleep Medicine Secretaries:  Luda Alejandro (For [place]ro[place]/Yeh): P: [phone]  F: [phone]  Mo Hayes ([place])Office: [phone] option 4 Office Fax: [phone]  Majo Tovar (For [place]): P: [phone]  Fax: [phone]  Katelyn Gerardo (For Jurcevic/Blank): P: [phone]  F: [phone]  Sandy Cheung (For [place]): P: [phone]  F: [phone]  Devika Zafar (For [place]/Jer[place]/Joshua): P: [phone]  F: [phone]  Fauzia Bowers (For Rob/Laboy): P: [phone]  F: [phone]     Adult Sleep Medicine Advanced Practice Providers:  Leonard Madden ([place], [place])  Leatha Cano ([facility])  Lula Badillo CNP ([place], [place], [facility])  Mavis Gonzalez CNP ([place], [place], [facility])  Chiquis Lanier ([facility], [facility])  Maryann Laboy CNP ([facility])      Our Sleep Testing Center (STC) Locations:  Our team will contact you to schedule your sleep study, however, you can contact us as follow:  Main Phone Line (scheduling only): [phone]), option 3  Adult and  "Pediatric Locations   [place] (6 years and older): [facility] - 4th floor ([address], [facility]) After hours line: [phone]  [facility] (Main campus: All ages): [facility], 6th floor. After hours line: [phone]   [place] (5 years and older; younger considered on case-by-case basis): [address]; [address], [address]. Scheduling  After hours line: [phone]   [place] (6 years and older): [address]; [address]; [address]   [place] (6 years and older): [address] [facility], [address]  After hours line: [phone]   [place] (13 years and older) in [place]: [address], 2nd floor  After hours line: [phone]   [place] (13 year and older): [address]  After hours line: [phone] (Home studies out of [facility])    Adult Only Locations:   [place] (18 years and older): [address] [facility], 2nd floor   [place] (18 years and older): [address] [facility] [facility]; 4th floor  After hours line: [phone]   [place] (18 years and older) at [place]: [address] [facility]  After hours line: [phone]        CONTACTING YOUR SLEEP MEDICINE PROVIDER:  Send a message directly to your provider through \"My Chart\", which is the email service through your  Records Account: [email or web address]   Call [phone] and leave a message. One of the administrative assistants will forward the message to your sleep medicine provider through \"My Chart\" and/or email.     Your sleep medicine provider for this visit was: CHATA Lockett-CNP    In the event that you are running more than 15 minutes late to your appointment, I will kindly ask you to reschedule.       "

## 2024-08-30 ENCOUNTER — APPOINTMENT (OUTPATIENT)
Dept: PRIMARY CARE | Facility: CLINIC | Age: 59
End: 2024-08-30
Payer: COMMERCIAL

## 2024-08-30 DIAGNOSIS — E53.9 VITAMIN B DEFICIENCY: Primary | ICD-10-CM

## 2024-08-30 PROCEDURE — 96372 THER/PROPH/DIAG INJ SC/IM: CPT | Performed by: INTERNAL MEDICINE

## 2024-08-30 RX ORDER — CYANOCOBALAMIN 1000 UG/ML
1000 INJECTION, SOLUTION INTRAMUSCULAR; SUBCUTANEOUS ONCE
Status: COMPLETED | OUTPATIENT
Start: 2024-08-30 | End: 2024-08-30

## 2024-08-31 DIAGNOSIS — Z79.4 TYPE 2 DIABETES MELLITUS WITH HYPERGLYCEMIA, WITH LONG-TERM CURRENT USE OF INSULIN (MULTI): ICD-10-CM

## 2024-08-31 DIAGNOSIS — E11.65 TYPE 2 DIABETES MELLITUS WITH HYPERGLYCEMIA, WITH LONG-TERM CURRENT USE OF INSULIN (MULTI): ICD-10-CM

## 2024-09-04 PROCEDURE — RXMED WILLOW AMBULATORY MEDICATION CHARGE

## 2024-09-04 RX ORDER — INSULIN GLARGINE 300 [IU]/ML
INJECTION, SOLUTION SUBCUTANEOUS
Qty: 13.5 ML | Refills: 0 | Status: SHIPPED | OUTPATIENT
Start: 2024-09-04

## 2024-09-07 ENCOUNTER — PHARMACY VISIT (OUTPATIENT)
Dept: PHARMACY | Facility: CLINIC | Age: 59
End: 2024-09-07
Payer: MEDICARE

## 2024-09-18 ENCOUNTER — APPOINTMENT (OUTPATIENT)
Dept: DERMATOLOGY | Facility: CLINIC | Age: 59
End: 2024-09-18
Payer: COMMERCIAL

## 2024-09-18 NOTE — PROGRESS NOTES
Follow up for diabetes. JUAN C with me 05/2024      History of Present Illness  58 y.o. male with hx of type 2 diabetes, obesity class II, HTN, erectile dysfunction.    Dx: 2015  HbA1c: 7.3% (9/20/2024), 7.5% (5/2/2024), 8.1% (1/16/2024), 8.3 (03/2023), 8.3% (12/2022)  Current regimen: Jardiance 25 mg QDAY, Toujeo 56 units QAM, Humalog 14 units BIDAC + SS#2, Mounjaro 10 mg/week   Past medications: metformin, glipizide, Trulicity, Ozempic (vision changes)  Complications: diabetic foot ulcer, nephropathy  Comorbidities: HTN, obesity    SMBG: Roverto 2    Hypoglycemia: no    Diet:  Breakfast (7:30 am)-Wasa crackers with peanut butter and jelly, roasted salmon and veggies, black coffee, hard boiled eggs, olives  Lunch-typically doesn't eat lunch  Dinner (6-8 pm)-salad, roasted chicken or pork, beef or fish, roasted or steamed vegetables, occasional rice or noodles  Snacks-popcorn, fruit (cantaloup)  Drinks: water with Crystal Light packets, black coffee, one can diet pepsi per week    Exercise: doing band exercises       ROS  General: no fever or chills  CV: no chest pain   Respiratory: no shortness of breath  MSK: no lower extremity edema  Neuro: no headache or dizziness  See HPI for Endocrine ROS    Past Medical History:   Diagnosis Date    Abnormal finding of blood chemistry, unspecified 06/21/2017    Abnormal blood chemistry    Acute pharyngitis, unspecified 11/11/2019    Pharyngitis    Acute pharyngitis, unspecified 08/18/2020    Sore throat    Anosmia 04/19/2021    Anosmia    Calculus of kidney 01/04/2021    Kidney stone on right side    Cellulitis, unspecified 12/21/2022    Cellulitis    Chronic sinusitis, unspecified 04/26/2018    Sinusitis    Cough, unspecified 04/19/2021    Cough    Disorder of kidney and ureter, unspecified 11/14/2022    Renal insufficiency    Dorsalgia, unspecified 12/22/2020    Back pain    Edema, unspecified 11/16/2022    Edema    Encounter for screening for malignant neoplasm of colon  02/11/2020    Colon cancer screening    Encounter for screening for malignant neoplasm of prostate 01/19/2022    Screening for prostate cancer    Encounter for screening for malignant neoplasm of prostate 05/19/2020    Encounter for prostate cancer screening    Essential (primary) hypertension 11/16/2022    Benign essential hypertension    Fever, unspecified 04/19/2021    Fever    Follicular cyst of the skin and subcutaneous tissue, unspecified 11/11/2019    Infected cyst of skin    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    Low HDL (under 40)    Liver disease, unspecified 01/27/2021    Liver lesion    Male erectile dysfunction, unspecified 02/07/2020    Erectile dysfunction    Myalgia, unspecified site 08/18/2020    Myalgia    Other chest pain 01/19/2022    Chest discomfort    Other conditions influencing health status 01/19/2022    Skin tear    Other fatigue 03/21/2019    Fatigue    Other malaise 08/18/2020    Malaise and fatigue    Other nonspecific abnormal finding of lung field 02/10/2021    Abnormal CT scan, lung    Pain in right knee 06/26/2020    Right knee pain    Pain in right shoulder 03/30/2022    Right shoulder pain    Proteinuria, unspecified 11/14/2022    Proteinuria    Proteinuria, unspecified 11/13/2015    Proteinuria    Type 2 diabetes mellitus without complications (Multi) 12/22/2022    Diabetes mellitus    Unspecified abdominal pain 05/26/2022    Abdominal pain    Unspecified open wound of unspecified toe(s) without damage to nail, initial encounter 03/16/2020    Wound, open, toe       Past Surgical History:   Procedure Laterality Date    AMPUTATION Left     left 2nd toe    HAND SURGERY  04/04/2014    Hand Surgery                                                                                                                                                          KNEE ARTHROPLASTY  04/04/2014    Knee Arthroplasty    OTHER SURGICAL HISTORY  06/17/2020    Colonoscopy       Social History      Socioeconomic History    Marital status:      Spouse name: Not on file    Number of children: Not on file    Years of education: Not on file    Highest education level: Not on file   Occupational History    Not on file   Tobacco Use    Smoking status: Former     Types: Cigarettes    Smokeless tobacco: Never   Vaping Use    Vaping status: Never Used   Substance and Sexual Activity    Alcohol use: Not Currently    Drug use: Never    Sexual activity: Not on file   Other Topics Concern    Not on file   Social History Narrative    Not on file     Social Determinants of Health     Financial Resource Strain: Not on file   Food Insecurity: Not on file   Transportation Needs: Not on file   Physical Activity: Not on file   Stress: Not on file   Social Connections: Not on file   Intimate Partner Violence: Not on file   Housing Stability: Not on file       Physical Exam   height is 2.134 m (7') and weight is 168 kg (370 lb) (abnormal). His temporal temperature is 36.2 °C (97.2 °F). His blood pressure is 140/82 and his pulse is 72.   General: not in acute distress  HEENT: JOVAN, EOMI  Thyroid: no goiter  Neuro: alert and oriented x 3    Current Outpatient Medications   Medication Sig Dispense Refill    acetaminophen (Tylenol) 325 mg tablet Take 2 tablets (650 mg) by mouth every 4 hours if needed (pain).      amLODIPine (Norvasc) 10 mg tablet TAKE 1 TABLET DAILY 90 tablet 1    atomoxetine (Strattera) 10 mg capsule Take 1 capsule (10 mg) by mouth once daily. Swallow capsule whole; do not open. If opened accidentally, do not touch eyes; wash hands immediately (product is an eye irritant). 90 capsule 1    cholecalciferol (Vitamin D-3) 25 MCG (1000 UT) tablet Take 1 tablet (25 mcg) by mouth once daily.      cyanocobalamin (Vitamin B-12) 1,000 mcg/mL injection Inject 2 mL (2,000 mcg) into the muscle. per AQ.      FreeStyle Roverto 2 Sensor kit Change sensor every 14 days 6 each 3    pen needle, diabetic (BD Ultra-Fine Mini Pen  "Needle) 31 gauge x 3/16\" needle Use to inject insulin 4 times daily 400 each 3    testosterone 20.25 mg/1.25 gram (1.62 %) gel in metered-dose pump Place 2 Pump on the skin once daily. 1 pump to each shoulder 75 g 3    testosterone cypionate (Depo-Testosterone) 200 mg/mL injection Inject 0.25 mL (50 mg) into the muscle 1 (one) time per week. 5 mL 3    ergocalciferol (Vitamin D-2) 1.25 MG (15387 UT) capsule TAKE 1 CAPSULE BY MOUTH ONE TIME PER WEEK (Patient not taking: Reported on 9/26/2024) 12 capsule 1    fenofibrate (Tricor) 48 mg tablet Take 1 tablet (48 mg) by mouth once daily. as directed 90 tablet 3    insulin aspart (NovoLOG Flexpen U-100 Insulin) 100 unit/mL (3 mL) pen Inject 14 units before each meal. Use sliding scale as needed. Maximum daily dose 60 units 60 mL 3    insulin glargine (Toujeo SoloStar U-300 Insulin) 300 unit/mL (1.5 mL) injection INJECT 56 UNITS SUBCUTANEOUSLY AT BEDTIME 18 mL 3    Jardiance 25 mg Take 1 tablet (25 mg) by mouth once daily. 90 tablet 3    losartan (Cozaar) 100 mg tablet Take 1 tablet (100 mg) by mouth once daily. 90 tablet 3    pravastatin (Pravachol) 20 mg tablet Take 1 tablet (20 mg) by mouth once daily at bedtime. 90 tablet 3    sildenafil (Viagra) 100 mg tablet Take 1 tablet (100 mg) by mouth if needed for erectile dysfunction (Start with half tab. Take 1 hour prior to intercourse on an empty stomach. If you have an erection for over 4 hours, please go to the emergency room.). 30 tablet 6    [START ON 9/29/2024] tirzepatide (Mounjaro) 12.5 mg/0.5 mL pen injector Inject 12.5 mg under the skin 1 (one) time per week. 6 mL 1     No current facility-administered medications for this visit.               Assessment and Plan  58 y.o. male  with hx of type 2 diabetes, obesity class II, HTN, erectile dysfunction, here for management of diabetes.    1. Type 2 diabetes mellitus  HbA1c: 7.3% (9/20/2024), 7.5% (5/2/2024), 8.1% (12/2023), 8.3 (03/2023), 8.3% (12/2022)  Current " regimen: Jardiance 25 mg QDAY, Toujeo 56 units QAM, Humalog 14 units BIDAC + SS#2, Mounjaro 10 mg/week  Eye exam: no DR (12/2023) [facility] [place]  Urine microalbumin: 332 ug/mg (12/2023) on SGLT2i since 03/2023  Podiatry: [facility], Dr. Kirill Barksdale, last seen 9/18/23  Lipids: HDL 39, LDL 54,  (12/2023) on fenofibrate and pravastatin    A1c: 7.3%.  Glycemic control is slowly but steadily improving.  Roverto download reviewed.  TIR improved from 37% to 57% but still with post-prandial hyperglycemia.  Takes Mounjaro on Sundays. Appetite is suppressed from Mon-Thursday.  Appetite returns on Fridday and he tends to eat more over the weekend (CGM readings support this). He knows he needs to work on what he eats on these days.  Interested in increasing Mounjaro today.    Taking Ahiflower fish oil now (mix of omega-3 acids). Discussed that if next lipid panel shows elevated TG, I would recommend switching to icosapent ethyl for better TG-lowering effect and cardiovascular outcomes.    PLAN:  -increase to Mounjaro 12.5 mg/week  -continue Toujeo 56 units QAM   -continue Humalog 14 units BIDAC + SS #2  -continue Jardiance  -check blood sugars before every meal and bedtime  -prefers 5 mm pen needles  -Roverto 3 through [facility]  -check HbA1c, urine microalbumin, and lipids before next visit    Follow-up in 4 months (labs prior)

## 2024-09-19 PROBLEM — E29.1 HYPOGONADISM IN MALE: Status: ACTIVE | Noted: 2024-09-19

## 2024-09-19 NOTE — PROGRESS NOTES
"Last visit 7/15/24  -restart TC .[facility] weekly    Today's visit:  Today's visit:  #Hypogonadism   -held T for polycythemia (was on .[facility] aneudy)  -restarted, feels much better     #polycythemia  -has been donating blood every 6 weeks  -sleep study later this week  -now improved     -Interested in fertility preservation: No.      HISTORY:   -Previous gynecomastia: none   -Personal or family history of prostate cancer: none   -Previous use of testosterone or anabolic steroids: none  -Mother had kidney cancer, grandmother had diabetes.   -No history of heart attack or stroke.      #Erectile Dysfunction  -Sildenafil 50mg --> not working sufficiently   -no drive right now         in the 1990's, now works as  at [employer].          Labs  Component      Latest Ref Rng 9/20/2024   LH      IU/L 0.1    Estradiol      pg/mL 43    PSA      <=4.00 ng/mL 0.87    Testosterone      240 - 1,000 ng/dL 339    HEMATOCRIT      41.0 - 52.0 % 54.6 (H)       Lab Results   Component Value Date    TESTOSTERONE 104 (L) 07/12/2024    TESTOSTERONE 543 05/17/2024    TESTOSTERONE 110 (L) 02/16/2024     Lab Results   Component Value Date    LH 2.9 07/12/2024     Lab Results   Component Value Date    FSH 7.1 03/17/2023     No components found for: \"ESTRADIAL\"  Lab Results   Component Value Date    PSA 0.70 07/12/2024     No components found for: \"CBC\"  Lab Results   Component Value Date    PROLACTIN 4.8 03/17/2023     Lab Results   Component Value Date    HGBA1C 7.5 (A) 05/02/2024     No components found for: \"HEMATOCRIT\"      Medications:    Current Outpatient Medications:     acetaminophen (Tylenol) 325 mg tablet, Take 2 tablets (650 mg) by mouth every 4 hours if needed (pain)., Disp: , Rfl:     amLODIPine (Norvasc) 10 mg tablet, TAKE 1 TABLET DAILY, Disp: 90 tablet, Rfl: 1    atomoxetine (Strattera) 10 mg capsule, Take 1 capsule (10 mg) by mouth once daily. Swallow capsule whole; do not open. If opened " "accidentally, do not touch eyes; wash hands immediately (product is an eye irritant)., Disp: 90 capsule, Rfl: 1    cholecalciferol (Vitamin D-3) 25 MCG (1000 UT) tablet, Take 1 tablet (25 mcg) by mouth once daily., Disp: , Rfl:     cyanocobalamin (Vitamin B-12) 1,000 mcg/mL injection, Inject 2 mL (2,000 mcg) into the muscle. per AQ., Disp: , Rfl:     ergocalciferol (Vitamin D-2) 1.25 MG (05148 UT) capsule, TAKE 1 CAPSULE BY MOUTH ONE TIME PER WEEK, Disp: 12 capsule, Rfl: 1    fenofibrate (Tricor) 48 mg tablet, TAKE 1 TABLET DAILY AS     DIRECTED, Disp: 90 tablet, Rfl: 2    FreeStyle Roverto 2 Sensor kit, Change sensor every 14 days, Disp: 6 each, Rfl: 3    insulin aspart (NovoLOG Flexpen U-100 Insulin) 100 unit/mL (3 mL) pen, INJECT 12 UNITS SUBCUTANEOUSLY BEFORE MEALS as DIRECTED. USE SLIDING SCALE AS DIRECTED; MAXIMUM DAILY DOSE 40 UNITS, Disp: 45 mL, Rfl: 0    insulin glargine (Toujeo SoloStar U-300 Insulin) 300 unit/mL (1.5 mL) injection, INJECT 28 UNITS SUBCUTANEOUSLY AT BEDTIME, IF BLOOD GLUCOSE IS GREATER THAN 220 INCREASE TO 32 UNITS AT BEDTIME, Disp: 13.5 mL, Rfl: 0    Jardiance 25 mg, Take 1 tablet (25 mg) by mouth once daily., Disp: 90 tablet, Rfl: 3    losartan (Cozaar) 100 mg tablet, TAKE 1 TABLET DAILY, Disp: 90 tablet, Rfl: 2    omega-3 acid ethyl esters (Lovaza) 1 gram capsule, Take 1,000 capsules (1,000 g) by mouth 2 times a day., Disp: , Rfl:     pen needle, diabetic (BD Ultra-Fine Mini Pen Needle) 31 gauge x 3/16\" needle, Use to inject insulin 4 times daily, Disp: 400 each, Rfl: 3    pravastatin (Pravachol) 20 mg tablet, Take 1 tablet (20 mg) by mouth once daily at bedtime., Disp: 90 tablet, Rfl: 3    sildenafil (Viagra) 100 mg tablet, Take 1 tablet (100 mg) by mouth if needed for erectile dysfunction (Start with half tab. Take 1 hour prior to intercourse on an empty stomach. If you have an erection for over 4 hours, please go to the emergency room.)., Disp: 30 tablet, Rfl: 6    testosterone " cypionate (Depo-Testosterone) 200 mg/mL injection, Inject 0.25 mL (50 mg) into the muscle 1 (one) time per week., Disp: 5 mL, Rfl: 3    tirzepatide (Mounjaro) 10 mg/0.5 mL pen injector, Inject 10 mg under the skin 1 (one) time per week., Disp: 6 mL, Rfl: 1    Allergy:  No Known Allergies     Exam  CONSTITUTIONAL:        No acute distress    HEAD:        Normocephalic and atraumatic    CHEST / RESPIRATORY      no excess work of breathing, no respiratory distress,    ABDOMEN / GASTROINTESTINAL:        Abdomen nondistended          Assessment/Plan  #Hypogonadism   --siwtch to androgel - med counseling done     #Erectile Dysfunction  -Continue sildenafil, --> increase to 100mg prn     #polycythemia       -sleep study  (pending)       -restart T at lower dose     #Screening for hyperestrogenemia   -E2   -arimidex if E is elevated      #Prostate cancer screening   -PSA.      #Fertility preservation   -Not interested, done having children.      Fuv in 2 months with low T fu labs     I have personally reviewed the OARRS report for this patient I have considered the risks of abuse, dependence, addiction and diversion. I believe that it is clinically appropriate for him to be prescribed this medication.”    -Testosterone therapy will require intensive monitoring for toxicity including elevated estrogen, polycythemia (hematocrit) and prostate cancer (PSA) with tests in 3 months.       G 2236  Visit complexity inherent to evaluation and management (E&M) associated with medical care services that serve as the continuing focal point for all needed health care services and/or with medical care services that are part of ongoing care related to a patient's single, serious condition or a complex condition.

## 2024-09-20 ENCOUNTER — LAB (OUTPATIENT)
Dept: LAB | Facility: LAB | Age: 59
End: 2024-09-20
Payer: COMMERCIAL

## 2024-09-20 DIAGNOSIS — E29.1 HYPOGONADISM IN MALE: ICD-10-CM

## 2024-09-20 DIAGNOSIS — E11.9 TYPE 2 DIABETES MELLITUS WITHOUT COMPLICATION, UNSPECIFIED WHETHER LONG TERM INSULIN USE (MULTI): ICD-10-CM

## 2024-09-20 DIAGNOSIS — Z12.5 PROSTATE CANCER SCREENING: ICD-10-CM

## 2024-09-20 DIAGNOSIS — E11.65 TYPE 2 DIABETES MELLITUS WITH HYPERGLYCEMIA, WITH LONG-TERM CURRENT USE OF INSULIN: ICD-10-CM

## 2024-09-20 DIAGNOSIS — I10 BENIGN ESSENTIAL HYPERTENSION: ICD-10-CM

## 2024-09-20 DIAGNOSIS — Z79.4 TYPE 2 DIABETES MELLITUS WITH HYPERGLYCEMIA, WITH LONG-TERM CURRENT USE OF INSULIN: ICD-10-CM

## 2024-09-20 LAB
EST. AVERAGE GLUCOSE BLD GHB EST-MCNC: 163 MG/DL
ESTRADIOL SERPL-MCNC: 43 PG/ML
HBA1C MFR BLD: 7.3 %
HCT VFR BLD AUTO: 54.6 % (ref 41–52)
LH SERPL-ACNC: 0.1 IU/L
PSA SERPL-MCNC: 0.87 NG/ML
TESTOST SERPL-MCNC: 339 NG/DL (ref 240–1000)

## 2024-09-20 PROCEDURE — 36415 COLL VENOUS BLD VENIPUNCTURE: CPT

## 2024-09-20 PROCEDURE — 83036 HEMOGLOBIN GLYCOSYLATED A1C: CPT

## 2024-09-20 PROCEDURE — 83002 ASSAY OF GONADOTROPIN (LH): CPT

## 2024-09-20 PROCEDURE — 84153 ASSAY OF PSA TOTAL: CPT

## 2024-09-20 PROCEDURE — 82670 ASSAY OF TOTAL ESTRADIOL: CPT

## 2024-09-20 PROCEDURE — 84403 ASSAY OF TOTAL TESTOSTERONE: CPT

## 2024-09-20 PROCEDURE — 85014 HEMATOCRIT: CPT

## 2024-09-20 RX ORDER — INSULIN ASPART 100 [IU]/ML
12 INJECTION, SOLUTION INTRAVENOUS; SUBCUTANEOUS
Qty: 45 ML | Refills: 0 | Status: SHIPPED | OUTPATIENT
Start: 2024-09-20

## 2024-09-20 RX ORDER — AMLODIPINE BESYLATE 10 MG/1
10 TABLET ORAL DAILY
Qty: 90 TABLET | Refills: 1 | Status: SHIPPED | OUTPATIENT
Start: 2024-09-20

## 2024-09-23 ENCOUNTER — OFFICE VISIT (OUTPATIENT)
Dept: UROLOGY | Facility: HOSPITAL | Age: 59
End: 2024-09-23
Payer: COMMERCIAL

## 2024-09-23 DIAGNOSIS — N52.9 ERECTILE DYSFUNCTION, UNSPECIFIED ERECTILE DYSFUNCTION TYPE: Primary | ICD-10-CM

## 2024-09-23 DIAGNOSIS — Z12.5 PROSTATE CANCER SCREENING: ICD-10-CM

## 2024-09-23 DIAGNOSIS — E29.1 HYPOGONADISM IN MALE: ICD-10-CM

## 2024-09-23 DIAGNOSIS — D75.1 POLYCYTHEMIA: ICD-10-CM

## 2024-09-23 PROCEDURE — 99214 OFFICE O/P EST MOD 30 MIN: CPT | Performed by: UROLOGY

## 2024-09-23 PROCEDURE — 4010F ACE/ARB THERAPY RXD/TAKEN: CPT | Performed by: UROLOGY

## 2024-09-23 PROCEDURE — G2211 COMPLEX E/M VISIT ADD ON: HCPCS | Performed by: UROLOGY

## 2024-09-23 PROCEDURE — 3051F HG A1C>EQUAL 7.0%<8.0%: CPT | Performed by: UROLOGY

## 2024-09-23 RX ORDER — TESTOSTERONE 20.25 MG/1.25G
2 GEL TOPICAL DAILY
Qty: 75 G | Refills: 3 | Status: SHIPPED | OUTPATIENT
Start: 2024-09-23

## 2024-09-26 ENCOUNTER — APPOINTMENT (OUTPATIENT)
Dept: ENDOCRINOLOGY | Facility: CLINIC | Age: 59
End: 2024-09-26
Payer: COMMERCIAL

## 2024-09-26 ENCOUNTER — PHARMACY VISIT (OUTPATIENT)
Dept: PHARMACY | Facility: CLINIC | Age: 59
End: 2024-09-26
Payer: MEDICARE

## 2024-09-26 VITALS
BODY MASS INDEX: 36.45 KG/M2 | SYSTOLIC BLOOD PRESSURE: 140 MMHG | DIASTOLIC BLOOD PRESSURE: 82 MMHG | TEMPERATURE: 97.2 F | HEART RATE: 72 BPM | HEIGHT: 78 IN | WEIGHT: 315 LBS

## 2024-09-26 DIAGNOSIS — Z79.4 TYPE 2 DIABETES MELLITUS WITH HYPERGLYCEMIA, WITH LONG-TERM CURRENT USE OF INSULIN: Primary | ICD-10-CM

## 2024-09-26 DIAGNOSIS — E11.65 TYPE 2 DIABETES MELLITUS WITH HYPERGLYCEMIA, WITH LONG-TERM CURRENT USE OF INSULIN: Primary | ICD-10-CM

## 2024-09-26 DIAGNOSIS — I10 BENIGN ESSENTIAL HYPERTENSION: ICD-10-CM

## 2024-09-26 PROCEDURE — 99215 OFFICE O/P EST HI 40 MIN: CPT | Performed by: STUDENT IN AN ORGANIZED HEALTH CARE EDUCATION/TRAINING PROGRAM

## 2024-09-26 PROCEDURE — 3077F SYST BP >= 140 MM HG: CPT | Performed by: STUDENT IN AN ORGANIZED HEALTH CARE EDUCATION/TRAINING PROGRAM

## 2024-09-26 PROCEDURE — 3008F BODY MASS INDEX DOCD: CPT | Performed by: STUDENT IN AN ORGANIZED HEALTH CARE EDUCATION/TRAINING PROGRAM

## 2024-09-26 PROCEDURE — 3079F DIAST BP 80-89 MM HG: CPT | Performed by: STUDENT IN AN ORGANIZED HEALTH CARE EDUCATION/TRAINING PROGRAM

## 2024-09-26 PROCEDURE — 95251 CONT GLUC MNTR ANALYSIS I&R: CPT | Performed by: STUDENT IN AN ORGANIZED HEALTH CARE EDUCATION/TRAINING PROGRAM

## 2024-09-26 PROCEDURE — 1036F TOBACCO NON-USER: CPT | Performed by: STUDENT IN AN ORGANIZED HEALTH CARE EDUCATION/TRAINING PROGRAM

## 2024-09-26 PROCEDURE — RXMED WILLOW AMBULATORY MEDICATION CHARGE

## 2024-09-26 PROCEDURE — 3051F HG A1C>EQUAL 7.0%<8.0%: CPT | Performed by: STUDENT IN AN ORGANIZED HEALTH CARE EDUCATION/TRAINING PROGRAM

## 2024-09-26 PROCEDURE — 4010F ACE/ARB THERAPY RXD/TAKEN: CPT | Performed by: STUDENT IN AN ORGANIZED HEALTH CARE EDUCATION/TRAINING PROGRAM

## 2024-09-26 RX ORDER — EMPAGLIFLOZIN 25 MG/1
25 TABLET, FILM COATED ORAL DAILY
Qty: 90 TABLET | Refills: 3 | Status: SHIPPED | OUTPATIENT
Start: 2024-09-26

## 2024-09-26 RX ORDER — LOSARTAN POTASSIUM 100 MG/1
100 TABLET ORAL DAILY
Qty: 90 TABLET | Refills: 3 | Status: SHIPPED | OUTPATIENT
Start: 2024-09-26

## 2024-09-26 RX ORDER — FENOFIBRATE 48 MG/1
48 TABLET, FILM COATED ORAL DAILY
Qty: 90 TABLET | Refills: 3 | Status: SHIPPED | OUTPATIENT
Start: 2024-09-26

## 2024-09-26 RX ORDER — PRAVASTATIN SODIUM 20 MG/1
20 TABLET ORAL NIGHTLY
Qty: 90 TABLET | Refills: 3 | Status: SHIPPED | OUTPATIENT
Start: 2024-09-26

## 2024-09-26 RX ORDER — INSULIN GLARGINE 300 [IU]/ML
INJECTION, SOLUTION SUBCUTANEOUS
Qty: 18 ML | Refills: 3 | Status: SHIPPED | OUTPATIENT
Start: 2024-09-26

## 2024-09-26 RX ORDER — INSULIN ASPART 100 [IU]/ML
INJECTION, SOLUTION INTRAVENOUS; SUBCUTANEOUS
Qty: 60 ML | Refills: 3 | Status: SHIPPED | OUTPATIENT
Start: 2024-09-26

## 2024-09-26 RX ORDER — TIRZEPATIDE 12.5 MG/.5ML
12.5 INJECTION, SOLUTION SUBCUTANEOUS
Qty: 6 ML | Refills: 1 | Status: SHIPPED | OUTPATIENT
Start: 2024-09-29

## 2024-09-27 ENCOUNTER — CLINICAL SUPPORT (OUTPATIENT)
Dept: SLEEP MEDICINE | Facility: CLINIC | Age: 59
End: 2024-09-27
Payer: COMMERCIAL

## 2024-09-27 DIAGNOSIS — R29.818 SUSPECTED SLEEP APNEA: ICD-10-CM

## 2024-09-27 NOTE — PROGRESS NOTES
Type of Study: HOME SLEEP STUDY - NOMAD     The patient received equipment and instructions for use of the [employer] Nomad HSAT device. The patient was instructed how to apply the effort belts, cannula, thermistor. It was also explained how the Nomad and oximeter components work.  The patient was asked to record their sleep for at least a 6-hour period.     The patient was informed of their responsibility for the device and acknowledged this by signing the HSAT device contract. The patient was asked to return the device on 9/30/2024 between the hours of 8am to 3pm to the [facility] in [place].     The patient was instructed to call 911 as usual for any medical- emergencies while at home.  The patient was also given a phone number for troubleshooting when using the device in case there were additional questions.

## 2024-09-30 ENCOUNTER — APPOINTMENT (OUTPATIENT)
Dept: PRIMARY CARE | Facility: CLINIC | Age: 59
End: 2024-09-30
Payer: COMMERCIAL

## 2024-09-30 DIAGNOSIS — E53.9 VITAMIN B DEFICIENCY: Primary | ICD-10-CM

## 2024-09-30 PROCEDURE — 96372 THER/PROPH/DIAG INJ SC/IM: CPT | Performed by: INTERNAL MEDICINE

## 2024-09-30 RX ORDER — CYANOCOBALAMIN 1000 UG/ML
1000 INJECTION, SOLUTION INTRAMUSCULAR; SUBCUTANEOUS ONCE
Status: COMPLETED | OUTPATIENT
Start: 2024-09-30 | End: 2024-09-30

## 2024-10-01 DIAGNOSIS — G47.33 OSA (OBSTRUCTIVE SLEEP APNEA): Primary | ICD-10-CM

## 2024-10-17 ENCOUNTER — PHARMACY VISIT (OUTPATIENT)
Dept: PHARMACY | Facility: CLINIC | Age: 59
End: 2024-10-17
Payer: MEDICARE

## 2024-10-17 PROCEDURE — RXMED WILLOW AMBULATORY MEDICATION CHARGE

## 2024-10-22 DIAGNOSIS — E29.1 HYPOGONADISM IN MALE: ICD-10-CM

## 2024-10-22 DIAGNOSIS — Z12.5 PROSTATE CANCER SCREENING: ICD-10-CM

## 2024-10-31 ENCOUNTER — APPOINTMENT (OUTPATIENT)
Dept: PRIMARY CARE | Facility: CLINIC | Age: 59
End: 2024-10-31
Payer: COMMERCIAL

## 2024-10-31 DIAGNOSIS — E53.8 B12 DEFICIENCY: ICD-10-CM

## 2024-10-31 PROCEDURE — 96372 THER/PROPH/DIAG INJ SC/IM: CPT | Performed by: INTERNAL MEDICINE

## 2024-10-31 RX ORDER — CYANOCOBALAMIN 1000 UG/ML
1000 INJECTION, SOLUTION INTRAMUSCULAR; SUBCUTANEOUS ONCE
Status: COMPLETED | OUTPATIENT
Start: 2024-10-31 | End: 2024-10-31

## 2024-11-08 ENCOUNTER — LAB (OUTPATIENT)
Dept: LAB | Facility: LAB | Age: 59
End: 2024-11-08
Payer: COMMERCIAL

## 2024-11-08 DIAGNOSIS — Z12.5 PROSTATE CANCER SCREENING: ICD-10-CM

## 2024-11-08 DIAGNOSIS — E29.1 HYPOGONADISM IN MALE: ICD-10-CM

## 2024-11-08 LAB
ESTRADIOL SERPL-MCNC: 30 PG/ML
HCT VFR BLD AUTO: 51.1 % (ref 41–52)
LH SERPL-ACNC: 0.2 IU/L
PSA SERPL-MCNC: 0.88 NG/ML
TESTOST SERPL-MCNC: 203 NG/DL (ref 240–1000)

## 2024-11-08 PROCEDURE — G0103 PSA SCREENING: HCPCS

## 2024-11-08 PROCEDURE — 84403 ASSAY OF TOTAL TESTOSTERONE: CPT

## 2024-11-08 PROCEDURE — 85014 HEMATOCRIT: CPT

## 2024-11-08 PROCEDURE — 82670 ASSAY OF TOTAL ESTRADIOL: CPT

## 2024-11-08 PROCEDURE — 83002 ASSAY OF GONADOTROPIN (LH): CPT

## 2024-11-08 PROCEDURE — 36415 COLL VENOUS BLD VENIPUNCTURE: CPT

## 2024-11-10 NOTE — PROGRESS NOTES
"Last visit 9/23/24  #Hypogonadism   --switch to androgel - med counseling done  #Erectile Dysfunction  -Continue sildenafil, --> increase to 100mg prn  #polycythemia       -sleep study  (pending)       -restart T at lower dose   Fuv in 2 months with low T fu labs    Today's visit:    #Hypogonadism   -held T for polycythemia (was on .4cc weekly)  -restarted on androgel 1 pump to each shoudler, feels better, bu tnot 100%    Wears CPAP, lost 6 lbs, feels well-rested now.     #polycythemia  -has been donating blood every 6 weeks  -now improved     -Interested in fertility preservation: No.      HISTORY:   -Previous gynecomastia: none   -Personal or family history of prostate cancer: none   -Previous use of testosterone or anabolic steroids: none  -Mother had kidney cancer, grandmother had diabetes.   -No history of heart attack or stroke.      #Erectile Dysfunction  -Sildenafil 100mg -->works well         in the 1990's, now works as  at [employer].          Labs  Component      Latest Ref Rng 11/08/2024   LH      IU/L 0.2   Estradiol      pg/mL 30   PSA      <=4.00 ng/mL 0.88   Testosterone      240 - 1,000 ng/dL 203   HEMATOCRIT      41.0 - 52.0 % 51.1 (H)       Lab Results   Component Value Date    TESTOSTERONE 203 (L) 11/08/2024    TESTOSTERONE 339 09/20/2024    TESTOSTERONE 104 (L) 07/12/2024     Lab Results   Component Value Date    LH 0.2 11/08/2024     Lab Results   Component Value Date    FSH 7.1 03/17/2023     No components found for: \"ESTRADIAL\"  Lab Results   Component Value Date    PSA 0.88 11/08/2024     No components found for: \"CBC\"  Lab Results   Component Value Date    PROLACTIN 4.8 03/17/2023     Lab Results   Component Value Date    HGBA1C 7.3 (H) 09/20/2024     No components found for: \"HEMATOCRIT\"      Medications:    Current Outpatient Medications:     acetaminophen (Tylenol) 325 mg tablet, Take 2 tablets (650 mg) by mouth every 4 hours if needed (pain)., Disp: , " "Rfl:     amLODIPine (Norvasc) 10 mg tablet, TAKE 1 TABLET DAILY, Disp: 90 tablet, Rfl: 1    atomoxetine (Strattera) 10 mg capsule, Take 1 capsule (10 mg) by mouth once daily. Swallow capsule whole; do not open. If opened accidentally, do not touch eyes; wash hands immediately (product is an eye irritant)., Disp: 90 capsule, Rfl: 1    cholecalciferol (Vitamin D-3) 25 MCG (1000 UT) tablet, Take 1 tablet (25 mcg) by mouth once daily., Disp: , Rfl:     cyanocobalamin (Vitamin B-12) 1,000 mcg/mL injection, Inject 2 mL (2,000 mcg) into the muscle. per AQ., Disp: , Rfl:     ergocalciferol (Vitamin D-2) 1.25 MG (84136 UT) capsule, TAKE 1 CAPSULE BY MOUTH ONE TIME PER WEEK (Patient not taking: Reported on 9/26/2024), Disp: 12 capsule, Rfl: 1    fenofibrate (Tricor) 48 mg tablet, Take 1 tablet (48 mg) by mouth once daily. as directed, Disp: 90 tablet, Rfl: 3    FreeStyle Roverto 2 Sensor kit, Change sensor every 14 days, Disp: 6 each, Rfl: 3    insulin aspart (NovoLOG Flexpen U-100 Insulin) 100 unit/mL (3 mL) pen, Inject 14 units before each meal. Use sliding scale as needed. Maximum daily dose 60 units, Disp: 60 mL, Rfl: 3    insulin glargine (Toujeo SoloStar U-300 Insulin) 300 unit/mL (1.5 mL) injection, INJECT 56 UNITS SUBCUTANEOUSLY AT BEDTIME, Disp: 18 mL, Rfl: 3    Jardiance 25 mg, Take 1 tablet (25 mg) by mouth once daily., Disp: 90 tablet, Rfl: 3    losartan (Cozaar) 100 mg tablet, Take 1 tablet (100 mg) by mouth once daily., Disp: 90 tablet, Rfl: 3    pen needle, diabetic (BD Ultra-Fine Mini Pen Needle) 31 gauge x 3/16\" needle, Use to inject insulin 4 times daily, Disp: 400 each, Rfl: 3    pravastatin (Pravachol) 20 mg tablet, Take 1 tablet (20 mg) by mouth once daily at bedtime., Disp: 90 tablet, Rfl: 3    sildenafil (Viagra) 100 mg tablet, Take 1 tablet (100 mg) by mouth if needed for erectile dysfunction (Start with half tab. Take 1 hour prior to intercourse on an empty stomach. If you have an erection for over 4 " hours, please go to the emergency room.)., Disp: 30 tablet, Rfl: 6    testosterone 20.25 mg/1.25 gram (1.62 %) gel in metered-dose pump, Place 2 Pump on the skin once daily. 1 pump to each shoulder, Disp: 75 g, Rfl: 3    testosterone cypionate (Depo-Testosterone) 200 mg/mL injection, Inject 0.25 mL (50 mg) into the muscle 1 (one) time per week., Disp: 5 mL, Rfl: 3    tirzepatide (Mounjaro) 12.5 mg/0.5 mL pen injector, Inject 12.5 mg under the skin 1 (one) time per week., Disp: 6 mL, Rfl: 1    Allergy:  No Known Allergies     Exam  CONSTITUTIONAL:        No acute distress    HEAD:        Normocephalic and atraumatic    CHEST / RESPIRATORY      no excess work of breathing, no respiratory distress,    ABDOMEN / GASTROINTESTINAL:        Abdomen nondistended          Assessment/Plan  #Hypogonadism   -Continue androgel --> increase to 2 pumps to each shoulder daily.     #Erectile Dysfunction  -Continue sildenafil, --> increase to 100mg prn     #polycythemia       -sleep study  (pending)       -restart T at lower dose     #Screening for hyperestrogenemia   -E2   -arimidex if E is elevated      #Prostate cancer screening   -PSA.      #Fertility preservation   -Not interested, done having children.      Fuv in 2 months with low T fu labs     I have personally reviewed the OARRS report for this patient I have considered the risks of abuse, dependence, addiction and diversion. I believe that it is clinically appropriate for him to be prescribed this medication.”    -Testosterone therapy will require intensive monitoring for toxicity including elevated estrogen, polycythemia (hematocrit) and prostate cancer (PSA) with tests in 3 months.       G 2211  Visit complexity inherent to evaluation and management (E&M) associated with medical care services that serve as the continuing focal point for all needed health care services and/or with medical care services that are part of ongoing care related to a patient's single, serious  condition or a complex condition.      Scribe Attestation  By signing my name below, I, Melita Velez Scribpaul   attest that this documentation has been prepared under the direction and in the presence of Paris Rosales MD.

## 2024-11-11 ENCOUNTER — OFFICE VISIT (OUTPATIENT)
Dept: UROLOGY | Facility: HOSPITAL | Age: 59
End: 2024-11-11
Payer: COMMERCIAL

## 2024-11-11 DIAGNOSIS — E29.1 HYPOGONADISM IN MALE: Primary | ICD-10-CM

## 2024-11-11 DIAGNOSIS — Z12.5 PROSTATE CANCER SCREENING: ICD-10-CM

## 2024-11-11 DIAGNOSIS — D75.1 POLYCYTHEMIA: ICD-10-CM

## 2024-11-11 PROCEDURE — 99214 OFFICE O/P EST MOD 30 MIN: CPT | Performed by: UROLOGY

## 2024-11-11 PROCEDURE — 99417 PROLNG OP E/M EACH 15 MIN: CPT | Performed by: UROLOGY

## 2024-11-11 PROCEDURE — 3051F HG A1C>EQUAL 7.0%<8.0%: CPT | Performed by: UROLOGY

## 2024-11-11 PROCEDURE — 4010F ACE/ARB THERAPY RXD/TAKEN: CPT | Performed by: UROLOGY

## 2024-11-11 RX ORDER — TESTOSTERONE 20.25 MG/1.25G
4 GEL TOPICAL DAILY
Qty: 150 G | Refills: 2 | Status: SHIPPED | OUTPATIENT
Start: 2024-11-11

## 2024-11-18 ENCOUNTER — PHARMACY VISIT (OUTPATIENT)
Dept: PHARMACY | Facility: CLINIC | Age: 59
End: 2024-11-18
Payer: MEDICARE

## 2024-11-18 PROCEDURE — RXMED WILLOW AMBULATORY MEDICATION CHARGE

## 2024-11-22 DIAGNOSIS — E11.65 TYPE 2 DIABETES MELLITUS WITH HYPERGLYCEMIA, WITH LONG-TERM CURRENT USE OF INSULIN: ICD-10-CM

## 2024-11-22 DIAGNOSIS — Z79.4 TYPE 2 DIABETES MELLITUS WITH HYPERGLYCEMIA, WITH LONG-TERM CURRENT USE OF INSULIN: ICD-10-CM

## 2024-11-26 ENCOUNTER — APPOINTMENT (OUTPATIENT)
Dept: PRIMARY CARE | Facility: CLINIC | Age: 59
End: 2024-11-26
Payer: COMMERCIAL

## 2024-11-26 VITALS
BODY MASS INDEX: 36.87 KG/M2 | DIASTOLIC BLOOD PRESSURE: 84 MMHG | WEIGHT: 315 LBS | SYSTOLIC BLOOD PRESSURE: 138 MMHG | OXYGEN SATURATION: 98 % | HEART RATE: 81 BPM

## 2024-11-26 DIAGNOSIS — F98.8 ATTENTION DEFICIT DISORDER, UNSPECIFIED TYPE: Primary | ICD-10-CM

## 2024-11-26 PROCEDURE — 4010F ACE/ARB THERAPY RXD/TAKEN: CPT | Performed by: INTERNAL MEDICINE

## 2024-11-26 PROCEDURE — 3075F SYST BP GE 130 - 139MM HG: CPT | Performed by: INTERNAL MEDICINE

## 2024-11-26 PROCEDURE — 3051F HG A1C>EQUAL 7.0%<8.0%: CPT | Performed by: INTERNAL MEDICINE

## 2024-11-26 PROCEDURE — 99214 OFFICE O/P EST MOD 30 MIN: CPT | Performed by: INTERNAL MEDICINE

## 2024-11-26 PROCEDURE — 3079F DIAST BP 80-89 MM HG: CPT | Performed by: INTERNAL MEDICINE

## 2024-11-26 RX ORDER — ATOMOXETINE 10 MG/1
10 CAPSULE ORAL DAILY
Qty: 30 CAPSULE | Refills: 3 | Status: SHIPPED | OUTPATIENT
Start: 2024-11-26 | End: 2025-03-26

## 2024-11-26 NOTE — PROGRESS NOTES
Subjective   Patient ID: Franck Mckinley is a 58 y.o. male who presents for Follow-up (3 month fuv ).    HPI  Patient in for a visit  He is on mounjaro 22 lbs down   Now on cpap the first month lost 6 lbs   He feels better except for the broken foot tripped over the dog cracked dislocate the big toe seeing Dr Barksdale was on h202 now on collagen powder to help heal the ulcer on the 5th toe     Review of Systems  General: Denies fever, chills, night sweats,  Eyes: Negative for recent visual changes  Ears, Nose, Throat :  Negative for hearing changes, sinus discomfort  Dermatologic: Negative for new skin conditions, rash  Respiratory: Negative for wheezing, shortness of breath, cough  Cardiovascular: Negative for chest pain, palpitations, or leg swelling  Gastrointestinal: Negative for nausea/vomiting, abdominal pain, changes in bowel habits  Genitourinary Negative for Urinary Incontinence  urgency , frequency, discomfort   Musculoskeletal: see hpi  Neurological: Negative for headaches, dizziness    Previous history  Past Medical History:   Diagnosis Date   • Abnormal finding of blood chemistry, unspecified 06/21/2017    Abnormal blood chemistry   • Acute pharyngitis, unspecified 11/11/2019    Pharyngitis   • Acute pharyngitis, unspecified 08/18/2020    Sore throat   • Anosmia 04/19/2021    Anosmia   • Calculus of kidney 01/04/2021    Kidney stone on right side   • Cellulitis, unspecified 12/21/2022    Cellulitis   • Chronic sinusitis, unspecified 04/26/2018    Sinusitis   • Cough, unspecified 04/19/2021    Cough   • Disorder of kidney and ureter, unspecified 11/14/2022    Renal insufficiency   • Dorsalgia, unspecified 12/22/2020    Back pain   • Edema, unspecified 11/16/2022    Edema   • Encounter for screening for malignant neoplasm of colon 02/11/2020    Colon cancer screening   • Encounter for screening for malignant neoplasm of prostate 01/19/2022    Screening for prostate cancer   • Encounter for screening  for malignant neoplasm of prostate 05/19/2020    Encounter for prostate cancer screening   • Essential (primary) hypertension 11/16/2022    Benign essential hypertension   • Fever, unspecified 04/19/2021    Fever   • Follicular cyst of the skin and subcutaneous tissue, unspecified 11/11/2019    Infected cyst of skin   • 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    Low HDL (under 40)   • Liver disease, unspecified 01/27/2021    Liver lesion   • Male erectile dysfunction, unspecified 02/07/2020    Erectile dysfunction   • Myalgia, unspecified site 08/18/2020    Myalgia   • Other chest pain 01/19/2022    Chest discomfort   • Other conditions influencing health status 01/19/2022    Skin tear   • Other fatigue 03/21/2019    Fatigue   • Other malaise 08/18/2020    Malaise and fatigue   • Other nonspecific abnormal finding of lung field 02/10/2021    Abnormal CT scan, lung   • Pain in right knee 06/26/2020    Right knee pain   • Pain in right shoulder 03/30/2022    Right shoulder pain   • Proteinuria, unspecified 11/14/2022    Proteinuria   • Proteinuria, unspecified 11/13/2015    Proteinuria   • Type 2 diabetes mellitus without complications (Multi) 12/22/2022    Diabetes mellitus   • Unspecified abdominal pain 05/26/2022    Abdominal pain   • Unspecified open wound of unspecified toe(s) without damage to nail, initial encounter 03/16/2020    Wound, open, toe     Past Surgical History:   Procedure Laterality Date   • AMPUTATION Left     left 2nd toe   • HAND SURGERY  04/04/2014    Hand Surgery                                                                                                                                                         • KNEE ARTHROPLASTY  04/04/2014    Knee Arthroplasty   • OTHER SURGICAL HISTORY  06/17/2020    Colonoscopy     Social History     Tobacco Use   • Smoking status: Former     Types: Cigarettes   • Smokeless tobacco: Never   Vaping Use   • Vaping status: Never Used   Substance Use Topics   •  "Alcohol use: Not Currently   • Drug use: Never     Family History   Problem Relation Name Age of Onset   • Kidney cancer Mother     • No Known Problems Daughter     • No Known Problems Daughter       Allergies   Allergen Reactions   • Chocolate Unknown     Current Outpatient Medications   Medication Instructions   • acetaminophen (Tylenol) 325 mg tablet 2 tablets, Every 4 hours PRN   • amLODIPine (NORVASC) 10 mg, oral, Daily   • atomoxetine (STRATTERA) 10 mg, oral, Daily, Swallow capsule whole; do not open. If opened accidentally, do not touch eyes; wash hands immediately (product is an eye irritant).   • cholecalciferol (Vitamin D-3) 25 MCG (1000 UT) tablet 1 tablet, Daily   • cyanocobalamin (Vitamin B-12) 1,000 mcg/mL injection 2 mL   • ergocalciferol (VITAMIN D-2) 1,250 mcg, oral, Once Weekly   • fenofibrate (TRICOR) 48 mg, oral, Daily, as directed   • FreeStyle Roverto 2 Sensor kit Change sensor every 14 days   • insulin aspart (NovoLOG Flexpen U-100 Insulin) 100 unit/mL (3 mL) pen Inject 14 units before each meal. Use sliding scale as needed. Maximum daily dose 60 units   • insulin glargine (Toujeo SoloStar U-300 Insulin) 300 unit/mL (1.5 mL) injection INJECT 56 UNITS SUBCUTANEOUSLY AT BEDTIME   • Jardiance 25 mg, oral, Daily   • losartan (COZAAR) 100 mg, oral, Daily   • Mounjaro 12.5 mg, subcutaneous, Once Weekly   • pen needle, diabetic (BD Ultra-Fine Mini Pen Needle) 31 gauge x 3/16\" needle Use to inject insulin 4 times daily   • pravastatin (PRAVACHOL) 20 mg, oral, Nightly   • sildenafil (VIAGRA) 100 mg, oral, As needed   • testosterone 20.25 mg/1.25 gram (1.62 %) gel in metered-dose pump 2 Pump, transdermal, Daily, 1 pump to each shoulder   • testosterone 20.25 mg/1.25 gram (1.62 %) gel in metered-dose pump 4 Pump, transdermal, Daily, 2 pumps to each shoulder   • testosterone cypionate (DEPO-TESTOSTERONE) 50 mg, intramuscular, Once Weekly       Objective       Physical Exam      Assessment/Plan   Franck ASTORGA" Elías is a 58 y.o. male who presents for the concerns below:    Problem List Items Addressed This Visit    None           Discussed with:   Return in :    Portions of this note were generated using digital voice recognition software, and may contain grammatical errors       Piper Alvarado MD  11/26/24  11:24 AM

## 2024-11-26 NOTE — PATIENT INSTRUCTIONS
Get your flu shot when you get your b12 shots next week and the first shingrix the next b12 shot visit and the second of the shingrix shot will be during our next visit in March

## 2024-12-02 ENCOUNTER — APPOINTMENT (OUTPATIENT)
Dept: PRIMARY CARE | Facility: CLINIC | Age: 59
End: 2024-12-02
Payer: COMMERCIAL

## 2024-12-02 DIAGNOSIS — E53.8 B12 DEFICIENCY: Primary | ICD-10-CM

## 2024-12-02 PROCEDURE — 96372 THER/PROPH/DIAG INJ SC/IM: CPT | Performed by: INTERNAL MEDICINE

## 2024-12-02 PROCEDURE — 90471 IMMUNIZATION ADMIN: CPT | Performed by: INTERNAL MEDICINE

## 2024-12-02 PROCEDURE — 90656 IIV3 VACC NO PRSV 0.5 ML IM: CPT | Performed by: INTERNAL MEDICINE

## 2024-12-02 RX ORDER — CYANOCOBALAMIN 1000 UG/ML
1000 INJECTION, SOLUTION INTRAMUSCULAR; SUBCUTANEOUS ONCE
Status: COMPLETED | OUTPATIENT
Start: 2024-12-02 | End: 2024-12-02

## 2024-12-02 RX ORDER — CYANOCOBALAMIN 1000 UG/ML
1000 INJECTION, SOLUTION INTRAMUSCULAR; SUBCUTANEOUS ONCE
Status: SHIPPED | OUTPATIENT
Start: 2024-12-02

## 2024-12-06 ENCOUNTER — TELEPHONE (OUTPATIENT)
Dept: ENDOCRINOLOGY | Facility: CLINIC | Age: 59
End: 2024-12-06
Payer: COMMERCIAL

## 2024-12-06 NOTE — TELEPHONE ENCOUNTER
Last office visit:  09/26/24  Next office visit:  02/11/25      Received notes from eye exam by Dr. DAX Jay @ [facility]  completed on 11/26/24.    Available for your review under   Chart review: Media: 12/06/24: Patient record: Eye Exam           Started first trimester

## 2024-12-10 ENCOUNTER — PATIENT MESSAGE (OUTPATIENT)
Dept: UROLOGY | Facility: HOSPITAL | Age: 59
End: 2024-12-10
Payer: COMMERCIAL

## 2024-12-10 DIAGNOSIS — E29.1 HYPOGONADISM IN MALE: ICD-10-CM

## 2024-12-10 RX ORDER — TESTOSTERONE 20.25 MG/1.25G
4 GEL TOPICAL DAILY
Qty: 150 G | Refills: 1 | Status: SHIPPED | OUTPATIENT
Start: 2024-12-10

## 2024-12-16 ENCOUNTER — PHARMACY VISIT (OUTPATIENT)
Dept: PHARMACY | Facility: CLINIC | Age: 59
End: 2024-12-16
Payer: MEDICARE

## 2024-12-16 PROCEDURE — RXMED WILLOW AMBULATORY MEDICATION CHARGE

## 2024-12-19 ENCOUNTER — OFFICE VISIT (OUTPATIENT)
Dept: SLEEP MEDICINE | Facility: CLINIC | Age: 59
End: 2024-12-19
Payer: COMMERCIAL

## 2024-12-19 ENCOUNTER — TELEPHONE (OUTPATIENT)
Dept: ENDOCRINOLOGY | Facility: CLINIC | Age: 59
End: 2024-12-19
Payer: COMMERCIAL

## 2024-12-19 VITALS
OXYGEN SATURATION: 96 % | HEART RATE: 71 BPM | WEIGHT: 315 LBS | HEIGHT: 78 IN | BODY MASS INDEX: 36.45 KG/M2 | DIASTOLIC BLOOD PRESSURE: 85 MMHG | SYSTOLIC BLOOD PRESSURE: 145 MMHG

## 2024-12-19 DIAGNOSIS — G47.33 OSA (OBSTRUCTIVE SLEEP APNEA): Primary | ICD-10-CM

## 2024-12-19 DIAGNOSIS — I10 BENIGN ESSENTIAL HYPERTENSION: ICD-10-CM

## 2024-12-19 PROBLEM — R29.818 SUSPECTED SLEEP APNEA: Status: RESOLVED | Noted: 2024-08-22 | Resolved: 2024-12-19

## 2024-12-19 PROCEDURE — 1036F TOBACCO NON-USER: CPT | Performed by: NURSE PRACTITIONER

## 2024-12-19 PROCEDURE — 99214 OFFICE O/P EST MOD 30 MIN: CPT | Performed by: NURSE PRACTITIONER

## 2024-12-19 PROCEDURE — 3051F HG A1C>EQUAL 7.0%<8.0%: CPT | Performed by: NURSE PRACTITIONER

## 2024-12-19 PROCEDURE — 3008F BODY MASS INDEX DOCD: CPT | Performed by: NURSE PRACTITIONER

## 2024-12-19 PROCEDURE — 4010F ACE/ARB THERAPY RXD/TAKEN: CPT | Performed by: NURSE PRACTITIONER

## 2024-12-19 PROCEDURE — 3077F SYST BP >= 140 MM HG: CPT | Performed by: NURSE PRACTITIONER

## 2024-12-19 PROCEDURE — 3078F DIAST BP <80 MM HG: CPT | Performed by: NURSE PRACTITIONER

## 2024-12-19 ASSESSMENT — SLEEP AND FATIGUE QUESTIONNAIRES
WAKING_TOO_EARLY: MILD
HOW LIKELY ARE YOU TO NOD OFF OR FALL ASLEEP WHEN YOU ARE A PASSENGER IN A CAR FOR AN HOUR WITHOUT A BREAK: WOULD NEVER DOZE
SLEEP_PROBLEM_NOTICEABLE_TO_OTHERS: NOT AT ALL NOTICEABLE
HOW LIKELY ARE YOU TO NOD OFF OR FALL ASLEEP WHILE SITTING QUIETLY AFTER LUNCH WITHOUT ALCOHOL: WOULD NEVER DOZE
SITING INACTIVE IN A PUBLIC PLACE LIKE A CLASS ROOM OR A MOVIE THEATER: WOULD NEVER DOZE
HOW LIKELY ARE YOU TO NOD OFF OR FALL ASLEEP WHILE SITTING AND TALKING TO SOMEONE: WOULD NEVER DOZE
HOW LIKELY ARE YOU TO NOD OFF OR FALL ASLEEP IN A CAR, WHILE STOPPED FOR A FEW MINUTES IN TRAFFIC: WOULD NEVER DOZE
HOW LIKELY ARE YOU TO NOD OFF OR FALL ASLEEP WHILE WATCHING TV: WOULD NEVER DOZE
SATISFACTION_WITH_CURRENT_SLEEP_PATTERN: VERY SATISFIED
SLEEP_PROBLEM_INTERFERES_DAILY_ACTIVITIES: NOT AT ALL NOTICEABLE
HOW LIKELY ARE YOU TO NOD OFF OR FALL ASLEEP WHILE LYING DOWN TO REST IN THE AFTERNOON WHEN CIRCUMSTANCES PERMIT: WOULD NEVER DOZE
HOW LIKELY ARE YOU TO NOD OFF OR FALL ASLEEP WHILE SITTING AND READING: WOULD NEVER DOZE
ESS-CHAD TOTAL SCORE: 0
WORRIED_DISTRESSED_DUE_TO_SLEEP: NOT AT ALL NOTICEABLE

## 2024-12-19 ASSESSMENT — ENCOUNTER SYMPTOMS
DEPRESSION: 0
OCCASIONAL FEELINGS OF UNSTEADINESS: 0
LOSS OF SENSATION IN FEET: 0

## 2024-12-19 ASSESSMENT — PAIN SCALES - GENERAL: PAINLEVEL_OUTOF10: 0-NO PAIN

## 2024-12-19 NOTE — PATIENT INSTRUCTIONS
MAINTAINING YOUR CPAP DEVICE:  You should try to keep your machine clean and working well. Clean the humidification chamber, as well as your nasal mask and tubing, at least once a week. Fill a sink with warm water and add a little mild detergent, like baby shampoo. You can add a little white vinegar if your wish. Gently wipe your supplies with the soapy water to free all the oils and dirt that may have collected. Once that's done, rinse these items with clean water until the soap is gone and let them air dry. You can hang your tubing over the curtain kimi in your bathroom so that it dries.    You should replace your mask and tubing frequently - about once a month for nasal pillows, and once every three months for the tubing, nasal mask, and filter.        [facility] Sleep Medicine  [facility] [id number] [facility] [address]       NAME: Franck Mckinley   DATE:  12/19/24    DIAGNOSIS:   1. MARGE (obstructive sleep apnea)  Positive Airway Pressure (PAP) Therapy          Thank you for coming to the Sleep Medicine Clinic today! Your sleep medicine provider today was: SHAMAR Lockett Below is a summary of your treatment plan, other important information, and our contact numbers:    TREATMENT PLAN:   - Follow-up in 12 months.  - If not already done, sign up for 'My Chart' and send prescription requests or messages through this    Annual Reminders About Your Sleep Apnea    Your sleep apnea is well controlled based on reviewing your PAP Data Report.     General Reminders:  Continue current machine settings. Continue using machine every night. Need at least 4 hours daily usage.   Remember to clean your mask, tubings, and water chamber regularly as instructed.  Know the replacement schedule of your supplies/ accessories and contact your DME (durable medical equipment) provider if you are due for them.  Avoid driving or operating heavy machinery  when drowsy. A person driving while sleepy is 5 times more likely to have an accident. If you feel sleepy, pull over and take a short power nap (sleep for less than 30 minutes). Otherwise, ask somebody to drive you.    Follow-up sooner through MyChart or calling our office if you have any of the following symptoms:  Snoring or stopping breathing while using the machine  Recurrence of fatigue, sleepiness, insomnia, and other symptoms you had prior using machine  Persistent or worsening fatigue or sleepiness despite regular use of machine  Issues tolerating the machine like bloating sensation, air hunger, too much hot air, too much pressure, taking off mask without recall in the middle of sleep, etc.     Other conservative measures to improve sleep apnea:  Losing weight can lead to some improvement of MARGE which means you will need lower pressures in machine to control your MARGE. In some patients, they don't need to use the machine after bariatric surgery. Hence, consider medical and/or surgical weight loss especially if your BMI is more than 35.  Avoiding alcohol, sedative-hypnotics, and sedating medications is imperative as these substances can worsen snoring and sleep apnea  If you have nasal congestion or seasonal allergies, improving your breathing through the nose is critical for treating sleep apnea, tolerating CPAP, and improving your sleep; hence, using intranasal steroid spray like Flonase might help. Make sure you know the proper way to use it.  Stay off your back when sleeping.    Common issues with PAP machine:  Mask gets dislodged when turning to the side: Consider getting a CPAP pillow or switching to a mask with hose on top.     Dry mouth:  Your machine has built-in humidifier that heats up the air to prevent dry mouth. It can be adjusted to your comfort. You can try that first and increase setting one level one night at a time to check which setting is comfortable and effective in lessening dry mouth.  "If dry mouth persists despite humidity setting adjustment, may apply OTC Biotene gel over the gums at bedtime.  If Biotene gel is not effective, consider trying XEROSTOM gel from [employer].  Also, eliminate or reduce dose of meds that can cause dry mouth if possible. Lastly, may try getting a separate room humidifier machine.    Airleaks: Please call DME as they may need to adjust your mask or refit you with a different kind of mask. In addition, you can ask DME for tips on getting a good mask seal and mask fit.     Difficulty tolerating the mask: Contact your DME to try a different kind of mask and/or call office to get a referral to Sleep Psychologist for CPAP desensitization. CPAP desensitization technique is a set of strategies that helps patient cope with claustrophobia and anxiety related to wearing mask. Alternatively, we can do a daytime mini-sleep study called PAP-nap trial wherein you will try on different kinds of mask and the sleep technician will try different pressure settings on CPAP and BPAP machines to see which specific pressure is tolerable and comfortable for you.     Water droplets or moisture within the hose and/or mask: This is called rain-out and it is caused by condensation of too much heated humidity on the cooler walls of the hose. If you have rain-out, turn down humidity settings or get a heated hose. If you already have a heated hose, turn up the \"tube temperature\" of the heated hose. Alternatively, if you don't want to get a heated hose or warmer air, may wrap the CPAP hose with stockings to keep it somewhat warm. Also, you need to place the machine on the floor and lower the hose so that water won't travel upward towards your mask.     You can also go to the following EDUCATION WEBSITES for further information:   American Academy of Sleep Medicine http://sleepeducation.org  National Sleep Foundation: https://sleepfoundation.org  American Sleep Apnea Association: " https://www.sleepapnea.org (for patients with sleep apnea)    Here at [facility], we wish you a restful sleep!     Instructions - Common MARGE Recs: - For your sleep apnea, continue to use your PAP every night and use it whenever you are sleeping.   - Avoid alcohol or sedatives several hours prior to sleeping.   - Get additional supplies for your PAP (e.g., mask, hose, filters) every 3 months or as your insurance allows from your DME company. Replacement cushions for your PAP mask can be requested monthly if airseals are an issue.  - Remember to clean your mask, tubings, and water chamber regularly as instructed.  - Avoid driving or operating heavy machinery when drowsy. A person driving while sleepy is five (5) times more likely to have an accident. If you feel sleepy, pull over and take a short power nap (sleep for less than 30 minutes). Otherwise, ask somebody to drive you.    EASY WAYS TO IMPROVE YOUR SLEEP:  1. Go to bed and wake up at the same time every day.   Aim for 8 hours but some people need less, some need more.   Get out of bed if you are not sleeping.   Limit naps to 20 min or less.   2. Expose yourself to daylight and/ or bright light in the morning.   Go outside or spend time near a window each morning.   You can use a light box (found on Amazon) if you wake before the sunrise.   Limit light exposure in the evenings (including electronic usage).   Try meditation, reading, stretching, deep breathing, warm shower or bath, or yoga nidra as part of your bedtime routine. There are many great FREE, videos or audio tracks on YouTube/ EventRegist, etc for guidance.  3. Exercise, in some form, EVERY day, but not too close to bedtime. Consider making this part of your routine at the start of your day, followed by a cool shower.  4. Eat meals at roughly the same time every day. Make sure you are prioritizing fruits, vegetables, whole grains, lean proteins.  5. Time your caffeine intake. Make sure you are  not drinking caffeine within 8 to 12 hours prior to your bedtime.   6. Avoid marijuana, alcohol, and nicotine. They will reduce sleep quality in any quantity.  7. Learn to manage anxiety. Psychology services at  can be reached at [phone] to schedule an appointment.     IMPORTANT INFORMATION:     Call 911 for medical emergencies.  Our offices are generally open from Monday-Friday, 9 am - 5 pm.  If you need to get in touch with me, you may either call me and my team(number is below) or you can use [employer].  If a referral for a test, for CPAP, or for another specialist was made, and you have not heard about scheduling this within a week, please call scheduling at [phone]).  If you are unable to make your appointment for clinic or an overnight study, kindly call the office at least 48 hours in advance to cancel and reschedule.  If you are on CPAP, please bring your device's card to each clinic appointment unless told otherwise by your provider.  There are no supporting services by either the sleep doctors or their staff on weekends and Holidays, or after 5 PM on weekdays.   If you have been asked to come to a sleep study, make sure you bring toiletries, a comfy pillow, and any nighttime medications that you may regularly take. Also be sure to eat dinner before you arrive. We generally do not provide meals.      PRESCRIPTIONS:  We require 7 days advanced notice for prescription refills. If we do not receive the request in this time, we cannot guarantee that your medication will be refilled in time. Please contact the sleep nurses listed below for refills or request via [employer].     IMPORTANT PHONE NUMBERS:   Sleep Medicine Clinic Fax: [phone]  Appointments (for Pediatric Sleep Clinic): [phone] - option 1  Appointments (for Adult Sleep Clinic): [phone] - option 2  Appointments (For Sleep Studies): [phone] - option 3  Behavioral Sleep Medicine: [phone]  Sleep  Surgery: [phone]  ENT (Otolaryngology): [phone]  Headache Clinic (Neurology): [phone]  Neurology: [phone]  Psychiatry: [phone]  Pulmonary Function Testing (PFT) Center: [phone]  Pulmonary Medicine: [phone]  [employer] (DME): [phone]  [employer] (DME): [phone]  [facility] (DME): [phone]    Our Adult Sleep Medicine Team (Please do not hesitate to call the office or sleep nurse with any questions between appointments):    Adult Sleep Nurses (rKistie Jeronimo, KATHIE and Mary Cheema RN):  For clinical questions and refilling prescriptions: [phone]  Email sleep diaries and other documents at: [email or web address]    Adult Sleep Medicine Secretaries:  Luda Alejandro (For [place]/Roxi/Yeh): P: [phone]  F: [phone]  Mo Hayes ([place])Office: [phone] option 4 Office Fax: [phone]  Majo Tovar (For [place]): P: [phone]  Fax: [phone]  Katelyn Gerardo (For [place]/Blank): P: [phone]  F: [phone]  Sandy Cheung (For [place]): P: [phone]  F: [phone]  Devika Zafar (For [place]/Jerome/Zakhary): P: [phone]  F: [phone]  Fauzia Bowers (For Rob/[place]): P: [phone]  F: [phone]     Adult Sleep Medicine Advanced Practice Providers:  Leonard Madden ([place], [place])  Leatha Cano ([facility])  Lula Badillo CNP ([place], [place], [facility])  Mavis Gonzalez CNP ([place], [place], [place])  Chiquis Lanier ([place])  Maryann Laboy CNP ([place])      Our Sleep Testing Center (STC) Locations:  Our team will contact you to schedule your sleep study, however, you can contact us as follow:  Main Phone Line (scheduling only): [phone]), option 3  Adult and Pediatric Locations  [facility] (6 years and older): [facility] - 4th floor ([address] [place] [address]) After  "hours line: [phone]  [facility] (Main campus: All ages): [facility], 6th floor. After hours line: [phone]   [place] (5 years and older; younger considered on case-by-case basis): [address]; [address], [address]. Scheduling  After hours line: [phone]   [place] (6 years and older): [address]; [address]; [address]   [place] (6 years and older): [address], [address]  After hours line: [phone]   [place] (13 years and older) in [place]: [address], 2nd floor  After hours line: [phone]   [place] (13 year and older): [address]  After hours line: [phone] (Home studies out of [facility])    Adult Only Locations:   [place] (18 years and older): [address] [place] [address], 2nd floor   [place] (18 years and older): [address] [facility]; 4th floor  After hours line: [phone]   [place] (18 years and older) at [place]: [id number] [place] [address]  After hours line: [phone]        CONTACTING YOUR SLEEP MEDICINE PROVIDER:  Send a message directly to your provider through \"My Chart\", which is the email service through your  Records Account: [email or web address]   Call [phone] and leave a message. One of the administrative assistants will forward the message to your sleep medicine provider through \"My Chart\" and/or email.     Your sleep medicine provider for this visit was: Lula Badillo, APRN-CNP    In the event that you are running more than 15 minutes late to your appointment, I will kindly ask you to reschedule.       "

## 2024-12-19 NOTE — PROGRESS NOTES
Patient: Franck Mckinley    [id number]  : 1965 -- AGE 59 y.o.    Provider: SHAMAR Lockett     Location [facility]   Service Date: 2024              [facility]  Follow Up Visit Note      HISTORY OF PRESENT ILLNESS     The patient's referring provider is: No ref. provider found    HISTORY OF PRESENT ILLNESS   Franck Mckinley is a 59 y.o. male who presents to a [facility] for a sleep medicine evaluation with concerns of polycythemia, low testosterone.    The patient has h/o HTN, low HDL, DM, vitamin D deficiency, liver lesion, ED, renal insufficiency, anemia, polycythemia, ADHD, neuropathy, pulmonary nodules.     Past Sleep History  Patient has had a home sleep study 24 showing severe sleep apnea with an ROBERT 3% was 45.7, ROBERT 4% of 40.8 and SpO2 ashleigh of 74%.     Current History    On today's visit, the patient reports tolerating PAP well. Feels he is sleeping much better. Falls asleep easily, stays asleep much better. Est about 7-8 hours of sleep per night. Notes he is not longer sleepy during the day, feels he is functioning well at work. Notes he lost 10 lbs in the first month of starting CPAP and his blood sugars have been better controlled, as well as hematocrit decreased on last blood draw, testosterone levels improved. He is comfortable with mask, finds the air pressure soothing. Is not using the humidifier, as he is concerned with risk of infection. He notes some blood tinged mucus in the mornings, but does not bother him the rest of the day. Has not changed the cushion out on the mask yet. Has extra at home. Watching scores on [employer].     Sleep schedule  on weekdays / work days:  Usual Bedtime:    10 pm  Falls asleep around:  30 min  # of awakenings:   Wake time:  5:30 am    Naps: none     Sleep schedule on weekends/non work days :  Usual Bedtime:    11 pm  Falls asleep  "around:  15 min  # of awakenings:   Wake time:  7-8 am    Naps: none    Preferred sleeping position: back and side     Sleep-related ROS:    ESS: 0   ORACIO: 1  FOSQ:  40    REVIEW OF SYSTEMS     REVIEW OF SYSTEMS  Review of Systems   All other systems reviewed and are negative.    ALLERGIES AND MEDICATIONS     ALLERGIES  Allergies   Allergen Reactions    Chocolate Unknown       MEDICATIONS  Current Outpatient Medications   Medication Sig Dispense Refill    acetaminophen (Tylenol) 325 mg tablet Take 2 tablets (650 mg) by mouth every 4 hours if needed (pain).      amLODIPine (Norvasc) 10 mg tablet TAKE 1 TABLET DAILY 90 tablet 1    atomoxetine (Strattera) 10 mg capsule Take 1 capsule (10 mg) by mouth once daily. Swallow capsule whole; do not open. If opened accidentally, do not touch eyes; wash hands immediately (product is an eye irritant). 30 capsule 3    cholecalciferol (Vitamin D-3) 25 MCG (1000 UT) tablet Take 1 tablet (25 mcg) by mouth once daily.      cyanocobalamin (Vitamin B-12) 1,000 mcg/mL injection Inject 2 mL (2,000 mcg) into the muscle. per AQ.      ergocalciferol (Vitamin D-2) 1.25 MG (50144 UT) capsule TAKE 1 CAPSULE BY MOUTH ONE TIME PER WEEK 12 capsule 1    fenofibrate (Tricor) 48 mg tablet Take 1 tablet (48 mg) by mouth once daily. as directed 90 tablet 3    FreeStyle Roverto 2 Sensor kit Change sensor every 14 days 6 each 3    insulin aspart (NovoLOG Flexpen U-100 Insulin) 100 unit/mL (3 mL) pen Inject 14 units before each meal. Use sliding scale as needed. Maximum daily dose 60 units 60 mL 3    insulin glargine (Toujeo SoloStar U-300 Insulin) 300 unit/mL (1.5 mL) injection INJECT 56 UNITS SUBCUTANEOUSLY AT BEDTIME 18 mL 3    Jardiance 25 mg Take 1 tablet (25 mg) by mouth once daily. 90 tablet 3    losartan (Cozaar) 100 mg tablet Take 1 tablet (100 mg) by mouth once daily. 90 tablet 3    pen needle, diabetic (BD Ultra-Fine Mini Pen Needle) 31 gauge x 3/16\" needle Use to inject insulin 4 times daily 400 " each 3    pravastatin (Pravachol) 20 mg tablet Take 1 tablet (20 mg) by mouth once daily at bedtime. 90 tablet 3    testosterone 20.25 mg/1.25 gram (1.62 %) gel in metered-dose pump Place 4 Pump on the skin once daily. 2 pumps to each shoulder 150 g 2    testosterone 20.25 mg/1.25 gram (1.62 %) gel in metered-dose pump Place 4 Pump on the skin once daily. 2 pumps to each shoulder 150 g 1    testosterone cypionate (Depo-Testosterone) 200 mg/mL injection Inject 0.25 mL (50 mg) into the muscle 1 (one) time per week. 5 mL 3    tirzepatide (Mounjaro) 12.5 mg/0.5 mL pen injector Inject 12.5 mg under the skin 1 (one) time per week. 6 mL 1    sildenafil (Viagra) 100 mg tablet Take 1 tablet (100 mg) by mouth if needed for erectile dysfunction (Start with half tab. Take 1 hour prior to intercourse on an empty stomach. If you have an erection for over 4 hours, please go to the emergency room.). 30 tablet 6    testosterone 20.25 mg/1.25 gram (1.62 %) gel in metered-dose pump Place 2 Pump on the skin once daily. 1 pump to each shoulder 75 g 3     Current Facility-Administered Medications   Medication Dose Route Frequency Provider Last Rate Last Admin    cyanocobalamin (Vitamin B-12) injection 1,000 mcg  1,000 mcg intramuscular Once Piper Alvarado MD             PAST HISTORY     PAST MEDICAL HISTORY  Past Medical History:   Diagnosis Date    Abnormal finding of blood chemistry, unspecified 06/21/2017    Abnormal blood chemistry    Acute pharyngitis, unspecified 11/11/2019    Pharyngitis    Acute pharyngitis, unspecified 08/18/2020    Sore throat    Anosmia 04/19/2021    Anosmia    Calculus of kidney 01/04/2021    Kidney stone on right side    Cellulitis, unspecified 12/21/2022    Cellulitis    Chronic sinusitis, unspecified 04/26/2018    Sinusitis    Cough, unspecified 04/19/2021    Cough    Disorder of kidney and ureter, unspecified 11/14/2022    Renal insufficiency    Dorsalgia, unspecified 12/22/2020    Back  pain    Edema, unspecified 11/16/2022    Edema    Encounter for screening for malignant neoplasm of colon 02/11/2020    Colon cancer screening    Encounter for screening for malignant neoplasm of prostate 01/19/2022    Screening for prostate cancer    Encounter for screening for malignant neoplasm of prostate 05/19/2020    Encounter for prostate cancer screening    Essential (primary) hypertension 11/16/2022    Benign essential hypertension    Fever, unspecified 04/19/2021    Fever    Follicular cyst of the skin and subcutaneous tissue, unspecified 11/11/2019    Infected cyst of skin    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    Low HDL (under 40)    Liver disease, unspecified 01/27/2021    Liver lesion    Male erectile dysfunction, unspecified 02/07/2020    Erectile dysfunction    Myalgia, unspecified site 08/18/2020    Myalgia    Other chest pain 01/19/2022    Chest discomfort    Other conditions influencing health status 01/19/2022    Skin tear    Other fatigue 03/21/2019    Fatigue    Other malaise 08/18/2020    Malaise and fatigue    Other nonspecific abnormal finding of lung field 02/10/2021    Abnormal CT scan, lung    Pain in right knee 06/26/2020    Right knee pain    Pain in right shoulder 03/30/2022    Right shoulder pain    Proteinuria, unspecified 11/14/2022    Proteinuria    Proteinuria, unspecified 11/13/2015    Proteinuria    Type 2 diabetes mellitus without complications (Multi) 12/22/2022    Diabetes mellitus    Unspecified abdominal pain 05/26/2022    Abdominal pain    Unspecified open wound of unspecified toe(s) without damage to nail, initial encounter 03/16/2020    Wound, open, toe       PAST SURGICAL HISTORY:  Past Surgical History:   Procedure Laterality Date    AMPUTATION Left     left 2nd toe    HAND SURGERY  04/04/2014    Hand Surgery                                                                                                                                                          KNEE  "ARTHROPLASTY  04/04/2014    Knee Arthroplasty    OTHER SURGICAL HISTORY  06/17/2020    Colonoscopy       FAMILY HISTORY  Family History   Problem Relation Name Age of Onset    Kidney cancer Mother      No Known Problems Daughter      No Known Problems Daughter         DOES/DOES NOT EC: does not have a family history of sleep disorder.      SOCIAL HISTORY  He  reports that he has quit smoking. His smoking use included cigarettes. He has never used smokeless tobacco. He reports that he does not currently use alcohol. He reports that he does not use drugs. He currently lives with his wife.     Caffeine consumption: 3x day  Alcohol consumption: none  Smoking: none  Marijuana: none    PHYSICAL EXAM     VITAL SIGNS: /85 (BP Location: Left arm, Patient Position: Sitting, BP Cuff Size: Large adult)   Pulse 71   Ht 2.134 m (7' 0.02\")   Wt (!) 166 kg (366 lb 8 oz)   SpO2 96%   BMI 36.51 kg/m²      PREVIOUS WEIGHTS:  Wt Readings from Last 3 Encounters:   12/19/24 (!) 166 kg (366 lb 8 oz)   11/26/24 (!) 168 kg (370 lb)   09/26/24 (!) 168 kg (370 lb)       Physical Exam  Physical Exam   Constitutional: Alert and oriented, cooperative, no obvious distress   HEENT: Non icteric or anemic, EOM WNL bilaterally   Neck: Supple, no JVD, no goiter, no adenopathy, no rigidity  Chest: CTA bilaterally, no wheezing, crackles, rubs   Cardiac: RRR, S1 and S2, no murmur, rub, thrill   Abdomen: Obese, Soft, nontender, no masses, no organomegaly   Extremities: No clubbing, no LL edema   Neuromuscular: Cranial nerves grossly intact, no focal deficits    RESULTS/DATA     Bicarbonate   Date Value   07/28/2023 CANCELED   07/27/2023 24 mmol/L   07/26/2023 23 mmol/L       No results found for this or any previous visit from the past 365 days.       Failed to redirect to the Timeline version of the [place] SmartLink.        Setup 10/8/2024    AirFit N30i Nasal CPAP Mask Cushion, Medium    MMO    ASSESSMENT/PLAN     Mr. Mckinley is a 59 y.o. " male and He was referred to the [facility] for evaluation of MARGE      Problem List and Orders  Problem List Items Addressed This Visit             ICD-10-CM    Benign essential hypertension I10     BP not at goal today  Follow with PCP           MARGE (obstructive sleep apnea) - Primary G47.33     home sleep study 9/27/24 showing severe sleep apnea with an ROBERT 3% was 45.7, ROBERT 4% of 40.8 and SpO2 ashleihg of 74%  -Well controlled on current settings  -continue current PAP settings  -Supply order updated today  -Discussed cleaning and supply replenishment recommendations today. Franck verbalized understanding.   -RTC 12 mo or sooner if needed            Relevant Orders    Positive Airway Pressure (PAP) Therapy    BMI 36.0-36.9,adult Z68.36     Weight loss recommended  Follow up with PCP for recommendations                 Follow up in 12 mo or sooner if needed

## 2024-12-19 NOTE — ASSESSMENT & PLAN NOTE
home sleep study 9/27/24 showing severe sleep apnea with an ROBERT 3% was 45.7, ROBERT 4% of 40.8 and SpO2 ashleigh of 74%  -Well controlled on current settings  -continue current PAP settings  -Supply order updated today  -Discussed cleaning and supply replenishment recommendations today. Franck verbalized understanding.   -RTC 12 mo or sooner if needed

## 2025-01-03 ENCOUNTER — APPOINTMENT (OUTPATIENT)
Dept: PRIMARY CARE | Facility: CLINIC | Age: 60
End: 2025-01-03
Payer: COMMERCIAL

## 2025-01-03 DIAGNOSIS — E53.8 B12 DEFICIENCY: ICD-10-CM

## 2025-01-03 RX ORDER — CYANOCOBALAMIN 1000 UG/ML
1000 INJECTION, SOLUTION INTRAMUSCULAR; SUBCUTANEOUS ONCE
Status: COMPLETED | OUTPATIENT
Start: 2025-01-03 | End: 2025-01-03

## 2025-01-03 RX ADMIN — CYANOCOBALAMIN 1000 MCG: 1000 INJECTION, SOLUTION INTRAMUSCULAR; SUBCUTANEOUS at 08:21

## 2025-01-03 RX ADMIN — CYANOCOBALAMIN 1000 MCG: 1000 INJECTION, SOLUTION INTRAMUSCULAR; SUBCUTANEOUS at 08:22

## 2025-01-06 PROCEDURE — RXMED WILLOW AMBULATORY MEDICATION CHARGE

## 2025-01-07 ENCOUNTER — PHARMACY VISIT (OUTPATIENT)
Dept: PHARMACY | Facility: CLINIC | Age: 60
End: 2025-01-07
Payer: MEDICARE

## 2025-01-09 RX ORDER — INSULIN GLARGINE 300 [IU]/ML
INJECTION, SOLUTION SUBCUTANEOUS
Qty: 13.5 ML | Refills: 0 | OUTPATIENT
Start: 2025-01-09

## 2025-01-10 ENCOUNTER — LAB (OUTPATIENT)
Dept: LAB | Facility: LAB | Age: 60
End: 2025-01-10
Payer: COMMERCIAL

## 2025-01-10 DIAGNOSIS — Z79.4 TYPE 2 DIABETES MELLITUS WITH HYPERGLYCEMIA, WITH LONG-TERM CURRENT USE OF INSULIN: ICD-10-CM

## 2025-01-10 DIAGNOSIS — Z12.5 PROSTATE CANCER SCREENING: ICD-10-CM

## 2025-01-10 DIAGNOSIS — E11.65 TYPE 2 DIABETES MELLITUS WITH HYPERGLYCEMIA, WITH LONG-TERM CURRENT USE OF INSULIN: ICD-10-CM

## 2025-01-10 DIAGNOSIS — E29.1 HYPOGONADISM IN MALE: ICD-10-CM

## 2025-01-10 LAB
ALBUMIN SERPL BCP-MCNC: 4.5 G/DL (ref 3.4–5)
ALP SERPL-CCNC: 61 U/L (ref 33–120)
ALT SERPL W P-5'-P-CCNC: 30 U/L (ref 10–52)
ANION GAP SERPL CALC-SCNC: 13 MMOL/L (ref 10–20)
AST SERPL W P-5'-P-CCNC: 19 U/L (ref 9–39)
BILIRUB SERPL-MCNC: 1 MG/DL (ref 0–1.2)
BUN SERPL-MCNC: 17 MG/DL (ref 6–23)
CALCIUM SERPL-MCNC: 9.9 MG/DL (ref 8.6–10.6)
CHLORIDE SERPL-SCNC: 106 MMOL/L (ref 98–107)
CHOLEST SERPL-MCNC: 122 MG/DL (ref 0–199)
CHOLESTEROL/HDL RATIO: 3.1
CO2 SERPL-SCNC: 30 MMOL/L (ref 21–32)
CREAT SERPL-MCNC: 1.06 MG/DL (ref 0.5–1.3)
CREAT UR-MCNC: 49.3 MG/DL (ref 20–370)
EGFRCR SERPLBLD CKD-EPI 2021: 81 ML/MIN/1.73M*2
EST. AVERAGE GLUCOSE BLD GHB EST-MCNC: 171 MG/DL
ESTRADIOL SERPL-MCNC: 52 PG/ML
GLUCOSE SERPL-MCNC: 144 MG/DL (ref 74–99)
HBA1C MFR BLD: 7.6 %
HCT VFR BLD AUTO: 53.2 % (ref 41–52)
HDLC SERPL-MCNC: 39.6 MG/DL
LDLC SERPL CALC-MCNC: 51 MG/DL
LH SERPL-ACNC: 0.1 IU/L
MICROALBUMIN UR-MCNC: 202.9 MG/L
MICROALBUMIN/CREAT UR: 411.6 UG/MG CREAT
NON HDL CHOLESTEROL: 82 MG/DL (ref 0–149)
POTASSIUM SERPL-SCNC: 4.6 MMOL/L (ref 3.5–5.3)
PROT SERPL-MCNC: 7.5 G/DL (ref 6.4–8.2)
PSA SERPL-MCNC: 0.8 NG/ML
SODIUM SERPL-SCNC: 144 MMOL/L (ref 136–145)
TESTOST SERPL-MCNC: 488 NG/DL (ref 240–1000)
TRIGL SERPL-MCNC: 156 MG/DL (ref 0–149)
VLDL: 31 MG/DL (ref 0–40)

## 2025-01-10 PROCEDURE — 80061 LIPID PANEL: CPT

## 2025-01-10 PROCEDURE — 83002 ASSAY OF GONADOTROPIN (LH): CPT

## 2025-01-10 PROCEDURE — 83036 HEMOGLOBIN GLYCOSYLATED A1C: CPT

## 2025-01-10 PROCEDURE — 84403 ASSAY OF TOTAL TESTOSTERONE: CPT

## 2025-01-10 PROCEDURE — G0103 PSA SCREENING: HCPCS

## 2025-01-10 PROCEDURE — 85014 HEMATOCRIT: CPT

## 2025-01-10 PROCEDURE — 82670 ASSAY OF TOTAL ESTRADIOL: CPT

## 2025-01-10 PROCEDURE — 82570 ASSAY OF URINE CREATININE: CPT

## 2025-01-10 PROCEDURE — 82043 UR ALBUMIN QUANTITATIVE: CPT

## 2025-01-10 PROCEDURE — 80053 COMPREHEN METABOLIC PANEL: CPT

## 2025-01-12 NOTE — PROGRESS NOTES
"Last visit 11/11/24  #Hypogonadism   -Continue androgel --> increase to 2 pumps to each shoulder daily.     #Erectile Dysfunction  -Continue sildenafil, --> increase to 100mg prn     #polycythemia       -sleep study  (pending)       -restart T at lower dose     Fuv in 2 months with low T fu labs    Today's visit:  Sleep medicine notes reviewed    #Hypogonadism   -held T for polycythemia (was on .4cc weekly)  -now on androgel 2 pump to each shoulder, feels better, but not 100%- feels there is a plateau     Wears CPAP, losing weight     #polycythemia  -has been donating blood every 6 weeks (last donation was jan 3rd)  -now improved     -Interested in fertility preservation: No.      HISTORY:   -Previous gynecomastia: none   -Personal or family history of prostate cancer: none   -Previous use of testosterone or anabolic steroids: none  -Mother had kidney cancer, grandmother had diabetes.   -No history of heart attack or stroke.      #Erectile Dysfunction  -Sildenafil 100mg -->works well         in the 1990's, now works as  at [employer].          Labs  Component      Latest Ref Rng 11/08/2024   LH      IU/L 0.2   Estradiol      pg/mL 30   PSA      <=4.00 ng/mL 0.88   Testosterone      240 - 1,000 ng/dL 203   HEMATOCRIT      41.0 - 52.0 % 51.1 (H)       Lab Results   Component Value Date    TESTOSTERONE 488 01/10/2025    TESTOSTERONE 203 (L) 11/08/2024    TESTOSTERONE 339 09/20/2024     Lab Results   Component Value Date    LH 0.1 01/10/2025     Lab Results   Component Value Date    FSH 7.1 03/17/2023     No components found for: \"ESTRADIAL\"  Lab Results   Component Value Date    PSA 0.80 01/10/2025     No components found for: \"CBC\"  Lab Results   Component Value Date    PROLACTIN 4.8 03/17/2023     Lab Results   Component Value Date    HGBA1C 7.6 (H) 01/10/2025     Lab Results   Component Value Date    HCT 53.2 (H) 01/10/2025     Lab Results   Component Value Date    ESTRADIOL 52 " "01/10/2025           Medications:    Current Outpatient Medications:     acetaminophen (Tylenol) 325 mg tablet, Take 2 tablets (650 mg) by mouth every 4 hours if needed (pain)., Disp: , Rfl:     amLODIPine (Norvasc) 10 mg tablet, TAKE 1 TABLET DAILY, Disp: 90 tablet, Rfl: 1    atomoxetine (Strattera) 10 mg capsule, Take 1 capsule (10 mg) by mouth once daily. Swallow capsule whole; do not open. If opened accidentally, do not touch eyes; wash hands immediately (product is an eye irritant)., Disp: 30 capsule, Rfl: 3    cholecalciferol (Vitamin D-3) 25 MCG (1000 UT) tablet, Take 1 tablet (25 mcg) by mouth once daily., Disp: , Rfl:     cyanocobalamin (Vitamin B-12) 1,000 mcg/mL injection, Inject 2 mL (2,000 mcg) into the muscle. per AQ., Disp: , Rfl:     ergocalciferol (Vitamin D-2) 1.25 MG (23473 UT) capsule, TAKE 1 CAPSULE BY MOUTH ONE TIME PER WEEK, Disp: 12 capsule, Rfl: 1    fenofibrate (Tricor) 48 mg tablet, Take 1 tablet (48 mg) by mouth once daily. as directed, Disp: 90 tablet, Rfl: 3    FreeStyle Roverto 2 Sensor kit, Change sensor every 14 days, Disp: 6 each, Rfl: 3    insulin aspart (NovoLOG Flexpen U-100 Insulin) 100 unit/mL (3 mL) pen, Inject 14 units before each meal. Use sliding scale as needed. Maximum daily dose 60 units, Disp: 60 mL, Rfl: 3    insulin glargine (Toujeo SoloStar U-300 Insulin) 300 unit/mL (1.5 mL) injection, INJECT 56 UNITS SUBCUTANEOUSLY AT BEDTIME, Disp: 18 mL, Rfl: 3    Jardiance 25 mg, Take 1 tablet (25 mg) by mouth once daily., Disp: 90 tablet, Rfl: 3    losartan (Cozaar) 100 mg tablet, Take 1 tablet (100 mg) by mouth once daily., Disp: 90 tablet, Rfl: 3    pen needle, diabetic (BD Ultra-Fine Mini Pen Needle) 31 gauge x 3/16\" needle, Use to inject insulin 4 times daily, Disp: 400 each, Rfl: 3    pravastatin (Pravachol) 20 mg tablet, Take 1 tablet (20 mg) by mouth once daily at bedtime., Disp: 90 tablet, Rfl: 3    sildenafil (Viagra) 100 mg tablet, Take 1 tablet (100 mg) by mouth if " needed for erectile dysfunction (Start with half tab. Take 1 hour prior to intercourse on an empty stomach. If you have an erection for over 4 hours, please go to the emergency room.)., Disp: 30 tablet, Rfl: 6    testosterone 20.25 mg/1.25 gram (1.62 %) gel in metered-dose pump, Place 4 Pump on the skin once daily. 2 pumps to each shoulder, Disp: 150 g, Rfl: 2    testosterone 20.25 mg/1.25 gram (1.62 %) gel in metered-dose pump, Place 4 Pump on the skin once daily. 2 pumps to each shoulder, Disp: 150 g, Rfl: 1    tirzepatide (Mounjaro) 12.5 mg/0.5 mL pen injector, Inject 12.5 mg under the skin 1 (one) time per week., Disp: 6 mL, Rfl: 1    Current Facility-Administered Medications:     cyanocobalamin (Vitamin B-12) injection 1,000 mcg, 1,000 mcg, intramuscular, Once, Piper Alvarado MD    Allergy:  Allergies   Allergen Reactions    Chocolate Unknown        Exam  CONSTITUTIONAL:        No acute distress    HEAD:        Normocephalic and atraumatic    CHEST / RESPIRATORY      no excess work of breathing, no respiratory distress,    ABDOMEN / GASTROINTESTINAL:        Abdomen nondistended          Assessment/Plan  #Hypogonadism   -Continue androgel  2 pumps to each shoulder daily.     #Erectile Dysfunction  -continue Sildenafil 100mg prn, med refilled     #polycythemia       -donate blood      #Screening for hyperestrogenemia   -E2   -arimidex if E is elevated      #Prostate cancer screening   -PSA.      #Fertility preservation   -Not interested, done having children.      Fuv with Mirieme in 3 months with low T fu labs     I have personally reviewed the [facility] report for this patient I have considered the risks of abuse, dependence, addiction and diversion. I believe that it is clinically appropriate for him to be prescribed this medication.”      G [id number]  Visit complexity inherent to evaluation and management (E&M) associated with medical care services that serve as the continuing focal point for all  needed health care services and/or with medical care services that are part of ongoing care related to a patient's single, serious condition or a complex condition.      Scribe Attestation  By signing my name below, IKamille Robsonjassi Menjivar Scribe   attest that this documentation has been prepared under the direction and in the presence of Paris Rosales MD.

## 2025-01-13 ENCOUNTER — APPOINTMENT (OUTPATIENT)
Dept: UROLOGY | Facility: HOSPITAL | Age: 60
End: 2025-01-13
Payer: COMMERCIAL

## 2025-01-13 DIAGNOSIS — N52.9 ERECTILE DYSFUNCTION, UNSPECIFIED ERECTILE DYSFUNCTION TYPE: ICD-10-CM

## 2025-01-13 DIAGNOSIS — E29.1 HYPOGONADISM IN MALE: ICD-10-CM

## 2025-01-13 DIAGNOSIS — Z12.5 PROSTATE CANCER SCREENING: ICD-10-CM

## 2025-01-13 PROCEDURE — 3051F HG A1C>EQUAL 7.0%<8.0%: CPT | Performed by: UROLOGY

## 2025-01-13 PROCEDURE — 3048F LDL-C <100 MG/DL: CPT | Performed by: UROLOGY

## 2025-01-13 PROCEDURE — 99213 OFFICE O/P EST LOW 20 MIN: CPT | Performed by: UROLOGY

## 2025-01-13 PROCEDURE — 4010F ACE/ARB THERAPY RXD/TAKEN: CPT | Performed by: UROLOGY

## 2025-01-13 PROCEDURE — 3060F POS MICROALBUMINURIA REV: CPT | Performed by: UROLOGY

## 2025-01-13 PROCEDURE — 1036F TOBACCO NON-USER: CPT | Performed by: UROLOGY

## 2025-01-13 PROCEDURE — G2211 COMPLEX E/M VISIT ADD ON: HCPCS | Performed by: UROLOGY

## 2025-01-13 RX ORDER — SILDENAFIL 100 MG/1
100 TABLET, FILM COATED ORAL AS NEEDED
Qty: 30 TABLET | Refills: 6 | Status: SHIPPED | OUTPATIENT
Start: 2025-01-13 | End: 2025-02-12

## 2025-01-13 RX ORDER — TESTOSTERONE 20.25 MG/1.25G
4 GEL TOPICAL DAILY
Qty: 150 G | Refills: 2 | Status: SHIPPED | OUTPATIENT
Start: 2025-01-13

## 2025-01-13 ASSESSMENT — PATIENT HEALTH QUESTIONNAIRE - PHQ9
SUM OF ALL RESPONSES TO PHQ9 QUESTIONS 1 AND 2: 0
2. FEELING DOWN, DEPRESSED OR HOPELESS: NOT AT ALL
1. LITTLE INTEREST OR PLEASURE IN DOING THINGS: NOT AT ALL

## 2025-02-04 ENCOUNTER — APPOINTMENT (OUTPATIENT)
Dept: PRIMARY CARE | Facility: CLINIC | Age: 60
End: 2025-02-04
Payer: COMMERCIAL

## 2025-02-04 DIAGNOSIS — E53.8 B12 DEFICIENCY: ICD-10-CM

## 2025-02-04 PROCEDURE — 3048F LDL-C <100 MG/DL: CPT | Performed by: INTERNAL MEDICINE

## 2025-02-04 PROCEDURE — 3051F HG A1C>EQUAL 7.0%<8.0%: CPT | Performed by: INTERNAL MEDICINE

## 2025-02-04 PROCEDURE — 4010F ACE/ARB THERAPY RXD/TAKEN: CPT | Performed by: INTERNAL MEDICINE

## 2025-02-04 PROCEDURE — 96372 THER/PROPH/DIAG INJ SC/IM: CPT | Performed by: INTERNAL MEDICINE

## 2025-02-04 PROCEDURE — 3060F POS MICROALBUMINURIA REV: CPT | Performed by: INTERNAL MEDICINE

## 2025-02-04 RX ORDER — CYANOCOBALAMIN 1000 UG/ML
1000 INJECTION, SOLUTION INTRAMUSCULAR; SUBCUTANEOUS ONCE
Status: COMPLETED | OUTPATIENT
Start: 2025-02-04 | End: 2025-02-04

## 2025-02-04 RX ADMIN — CYANOCOBALAMIN 1000 MCG: 1000 INJECTION, SOLUTION INTRAMUSCULAR; SUBCUTANEOUS at 09:17

## 2025-02-04 RX ADMIN — CYANOCOBALAMIN 1000 MCG: 1000 INJECTION, SOLUTION INTRAMUSCULAR; SUBCUTANEOUS at 09:16

## 2025-02-06 NOTE — PROGRESS NOTES
Follow up for diabetes. LV with me 09/2024      History of Present Illness  59 y.o. male with hx of type 2 diabetes, obesity class II, HTN, erectile dysfunction.    Dx: 2015  HbA1c: 7.6% (01/2025), 7.3% (9/20/2024), 7.5% (5/2/2024), 8.1% (1/16/2024), 8.3 (03/2023), 8.3% (12/2022)  Current regimen: Jardiance 25 mg QDAY, Toujeo 56 units QAM, Humalog 14 units BIDAC + SS#2, Mounjaro 12.5 mg/week   Past medications: metformin, glipizide, Trulicity, Ozempic (blurry vision)  Complications: diabetic foot ulcer, nephropathy  Comorbidities: HTN, obesity    SMBG: Roverto 2    Hypoglycemia: no    Diet:  Breakfast (7:30 am)-Wasa crackers with peanut butter and jelly, roasted salmon and veggies, black coffee, hard boiled eggs, olives  Lunch-typically doesn't eat lunch  Dinner (6-8 pm)-salad, roasted chicken or pork, beef or fish, roasted or steamed vegetables, occasional rice or noodles  Snacks-popcorn, fruit (cantaloup)  Drinks: water with Crystal Light packets, black coffee, one can diet pepsi per week    Exercise: doing band exercises     Today:  -doing well  -has been eating a lot of fruit (4-5 servings per day of apples, kiwi, tangerines, etc)    ROS  General: no fever or chills  CV: no chest pain   Respiratory: no shortness of breath  MSK: no lower extremity edema  Neuro: no headache or dizziness  See HPI for Endocrine ROS    Past Medical History:   Diagnosis Date    Abnormal finding of blood chemistry, unspecified 06/21/2017    Abnormal blood chemistry    Acute pharyngitis, unspecified 11/11/2019    Pharyngitis    Acute pharyngitis, unspecified 08/18/2020    Sore throat    Anosmia 04/19/2021    Anosmia    Calculus of kidney 01/04/2021    Kidney stone on right side    Cellulitis, unspecified 12/21/2022    Cellulitis    Chronic sinusitis, unspecified 04/26/2018    Sinusitis    Cough, unspecified 04/19/2021    Cough    Disorder of kidney and ureter, unspecified 11/14/2022    Renal insufficiency    Dorsalgia, unspecified  12/22/2020    Back pain    Edema, unspecified 11/16/2022    Edema    Encounter for screening for malignant neoplasm of colon 02/11/2020    Colon cancer screening    Encounter for screening for malignant neoplasm of prostate 01/19/2022    Screening for prostate cancer    Encounter for screening for malignant neoplasm of prostate 05/19/2020    Encounter for prostate cancer screening    Essential (primary) hypertension 11/16/2022    Benign essential hypertension    Fever, unspecified 04/19/2021    Fever    Follicular cyst of the skin and subcutaneous tissue, unspecified 11/11/2019    Infected cyst of skin    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    Low HDL (under 40)    Liver disease, unspecified 01/27/2021    Liver lesion    Male erectile dysfunction, unspecified 02/07/2020    Erectile dysfunction    Myalgia, unspecified site 08/18/2020    Myalgia    Other chest pain 01/19/2022    Chest discomfort    Other conditions influencing health status 01/19/2022    Skin tear    Other fatigue 03/21/2019    Fatigue    Other malaise 08/18/2020    Malaise and fatigue    Other nonspecific abnormal finding of lung field 02/10/2021    Abnormal CT scan, lung    Pain in right knee 06/26/2020    Right knee pain    Pain in right shoulder 03/30/2022    Right shoulder pain    Proteinuria, unspecified 11/14/2022    Proteinuria    Proteinuria, unspecified 11/13/2015    Proteinuria    Type 2 diabetes mellitus without complications (Multi) 12/22/2022    Diabetes mellitus    Unspecified abdominal pain 05/26/2022    Abdominal pain    Unspecified open wound of unspecified toe(s) without damage to nail, initial encounter 03/16/2020    Wound, open, toe       Past Surgical History:   Procedure Laterality Date    AMPUTATION Left     left 2nd toe    HAND SURGERY  04/04/2014    Hand Surgery                                                                                                                                                          KNEE  ARTHROPLASTY  04/04/2014    Knee Arthroplasty    OTHER SURGICAL HISTORY  06/17/2020    Colonoscopy       Social History     Socioeconomic History    Marital status:      Spouse name: Not on file    Number of children: Not on file    Years of education: Not on file    Highest education level: Not on file   Occupational History    Not on file   Tobacco Use    Smoking status: Former     Types: Cigarettes    Smokeless tobacco: Never   Vaping Use    Vaping status: Never Used   Substance and Sexual Activity    Alcohol use: Not Currently    Drug use: Never    Sexual activity: Not on file   Other Topics Concern    Not on file   Social History Narrative    Not on file     Social Drivers of Health     Financial Resource Strain: Not on file   Food Insecurity: Not on file   Transportation Needs: Not on file   Physical Activity: Not on file   Stress: Not on file   Social Connections: Not on file   Intimate Partner Violence: Not on file   Housing Stability: Not on file       Physical Exam   height is 2.134 m (7') and weight is 161 kg (356 lb) (abnormal). His temporal temperature is 35.5 °C (95.9 °F). His blood pressure is 151/78 and his pulse is 84.   General: not in acute distress  HEENT: JOVAN, EOMI  Thyroid: no goiter  Neuro: alert and oriented x 3    Current Outpatient Medications   Medication Sig Dispense Refill    acetaminophen (Tylenol) 325 mg tablet Take 2 tablets (650 mg) by mouth every 4 hours if needed (pain).      amLODIPine (Norvasc) 10 mg tablet TAKE 1 TABLET DAILY 90 tablet 1    atomoxetine (Strattera) 10 mg capsule Take 1 capsule (10 mg) by mouth once daily. Swallow capsule whole; do not open. If opened accidentally, do not touch eyes; wash hands immediately (product is an eye irritant). 30 capsule 3    cholecalciferol (Vitamin D-3) 25 MCG (1000 UT) tablet Take 1 tablet (25 mcg) by mouth once daily.      cyanocobalamin (Vitamin B-12) 1,000 mcg/mL injection Inject 2 mL (2,000 mcg) into the muscle. per AQ.    "   ergocalciferol (Vitamin D-2) 1.25 MG (71610 UT) capsule TAKE 1 CAPSULE BY MOUTH ONE TIME PER WEEK 12 capsule 1    fenofibrate (Tricor) 48 mg tablet Take 1 tablet (48 mg) by mouth once daily. as directed 90 tablet 3    FreeStyle Roverto 2 Sensor kit Change sensor every 14 days 6 each 3    insulin aspart (NovoLOG Flexpen U-100 Insulin) 100 unit/mL (3 mL) pen Inject 14 units before each meal. Use sliding scale as needed. Maximum daily dose 60 units 60 mL 3    insulin glargine (Toujeo SoloStar U-300 Insulin) 300 unit/mL (1.5 mL) injection INJECT 56 UNITS SUBCUTANEOUSLY AT BEDTIME 18 mL 3    Jardiance 25 mg Take 1 tablet (25 mg) by mouth once daily. 90 tablet 3    losartan (Cozaar) 100 mg tablet Take 1 tablet (100 mg) by mouth once daily. 90 tablet 3    pen needle, diabetic (BD Ultra-Fine Mini Pen Needle) 31 gauge x 3/16\" needle Use to inject insulin 4 times daily 400 each 3    pravastatin (Pravachol) 20 mg tablet Take 1 tablet (20 mg) by mouth once daily at bedtime. 90 tablet 3    sildenafil (Viagra) 100 mg tablet Take 1 tablet (100 mg) by mouth if needed for erectile dysfunction (Start with half tab. Take 1 hour prior to intercourse on an empty stomach. If you have an erection for over 4 hours, please go to the emergency room.). 30 tablet 6    testosterone 20.25 mg/1.25 gram (1.62 %) gel in metered-dose pump Place 4 Pump on the skin once daily. 2 pumps to each shoulder 150 g 1    testosterone 20.25 mg/1.25 gram (1.62 %) gel in metered-dose pump Place 4 Pump on the skin once daily. 2 pumps to each shoulder 150 g 2    tirzepatide (Mounjaro) 15 mg/0.5 mL pen injector Inject 15 mg under the skin 1 (one) time per week. 6 mL 1     Current Facility-Administered Medications   Medication Dose Route Frequency Provider Last Rate Last Admin    cyanocobalamin (Vitamin B-12) injection 1,000 mcg  1,000 mcg intramuscular Once Piper Alvarado MD                   Assessment and Plan  59 y.o. male  with hx of type 2 " diabetes, obesity class II, HTN, erectile dysfunction, here for management of diabetes.    1. Type 2 diabetes mellitus  HbA1c: 7.6% (01/2025), 7.3% (9/20/2024), 7.5% (5/2/2024), 8.1% (12/2023), 8.3 (03/2023), 8.3% (12/2022)  Current regimen: Jardiance 25 mg QDAY, Toujeo 56 units QAM, Humalog 14 units TIDAC + SS#2 (only taking when BG>150), Mounjaro 12.5 mg/week  Eye exam: no DR (11/2024) goes to [facility] in [place]  Urine microalbumin: 411 ug/mg (01/2025) on SGLT2i since 03/2023  Podiatry: [facility], Dr. Kirill Barksdale, last seen 9/18/23  Lipids: HDL 40, LDL 51,  (01/2025) on fenofibrate and pravastatin    A1c: 7.6% last month.  Roverto download reviewed.  Takes Mounjaro on Sundays. Appetite is suppressed from Mon-Thursday.  Appetite returns on Friday and he tends to eat more over the weekend (CGM readings support this). He knows he needs to work on what he eats on these days.    He was not taking Humalog until BG>150 (14 units + SS).  Reviewed how to take Humalog appropriately.  Will have patient increase Humalog on Fri-Sun as he has significant post-prandial hyperglycemia on these days.    He has lost almost 15 lbs since LV. He is agreeable to increasing Mounjaro.    Taking Ahiflower fish oil now (mix of omega-3 acids). TG is slightly lower than prior.    PLAN:  -increase Mounjaro to 15 mg/week  -continue Toujeo 56 units QAM   -change Humalog to 12 units TIDAC + SS #2, Mon-Thur; 20 units TIDAC+SS#2, Fri-Sun  -continue Jardiance  -check blood sugars before every meal and bedtime  -prefers 5 mm pen needles  -Roverto 3 through [facility]    Follow-up in 4 months

## 2025-02-10 DIAGNOSIS — F98.8 ATTENTION DEFICIT DISORDER, UNSPECIFIED TYPE: ICD-10-CM

## 2025-02-10 RX ORDER — ATOMOXETINE 10 MG/1
10 CAPSULE ORAL DAILY
Qty: 30 CAPSULE | Refills: 3 | Status: SHIPPED | OUTPATIENT
Start: 2025-02-10 | End: 2025-06-10

## 2025-02-10 NOTE — TELEPHONE ENCOUNTER
Good morning  can you please send a refill for this to [facility]. (Atomoxetine) thank you

## 2025-02-11 ENCOUNTER — APPOINTMENT (OUTPATIENT)
Dept: ENDOCRINOLOGY | Facility: CLINIC | Age: 60
End: 2025-02-11
Payer: COMMERCIAL

## 2025-02-11 VITALS
DIASTOLIC BLOOD PRESSURE: 78 MMHG | WEIGHT: 315 LBS | HEIGHT: 78 IN | SYSTOLIC BLOOD PRESSURE: 151 MMHG | BODY MASS INDEX: 36.45 KG/M2 | TEMPERATURE: 95.9 F | HEART RATE: 84 BPM

## 2025-02-11 DIAGNOSIS — E11.65 TYPE 2 DIABETES MELLITUS WITH HYPERGLYCEMIA, WITH LONG-TERM CURRENT USE OF INSULIN: Primary | ICD-10-CM

## 2025-02-11 DIAGNOSIS — Z79.4 TYPE 2 DIABETES MELLITUS WITH HYPERGLYCEMIA, WITH LONG-TERM CURRENT USE OF INSULIN: Primary | ICD-10-CM

## 2025-02-11 PROCEDURE — RXMED WILLOW AMBULATORY MEDICATION CHARGE

## 2025-02-11 PROCEDURE — 3008F BODY MASS INDEX DOCD: CPT | Performed by: STUDENT IN AN ORGANIZED HEALTH CARE EDUCATION/TRAINING PROGRAM

## 2025-02-11 PROCEDURE — 1036F TOBACCO NON-USER: CPT | Performed by: STUDENT IN AN ORGANIZED HEALTH CARE EDUCATION/TRAINING PROGRAM

## 2025-02-11 PROCEDURE — 99215 OFFICE O/P EST HI 40 MIN: CPT | Performed by: STUDENT IN AN ORGANIZED HEALTH CARE EDUCATION/TRAINING PROGRAM

## 2025-02-11 PROCEDURE — 95251 CONT GLUC MNTR ANALYSIS I&R: CPT | Performed by: STUDENT IN AN ORGANIZED HEALTH CARE EDUCATION/TRAINING PROGRAM

## 2025-02-11 PROCEDURE — 3051F HG A1C>EQUAL 7.0%<8.0%: CPT | Performed by: STUDENT IN AN ORGANIZED HEALTH CARE EDUCATION/TRAINING PROGRAM

## 2025-02-11 PROCEDURE — 3077F SYST BP >= 140 MM HG: CPT | Performed by: STUDENT IN AN ORGANIZED HEALTH CARE EDUCATION/TRAINING PROGRAM

## 2025-02-11 PROCEDURE — 3060F POS MICROALBUMINURIA REV: CPT | Performed by: STUDENT IN AN ORGANIZED HEALTH CARE EDUCATION/TRAINING PROGRAM

## 2025-02-11 PROCEDURE — 3078F DIAST BP <80 MM HG: CPT | Performed by: STUDENT IN AN ORGANIZED HEALTH CARE EDUCATION/TRAINING PROGRAM

## 2025-02-11 PROCEDURE — 4010F ACE/ARB THERAPY RXD/TAKEN: CPT | Performed by: STUDENT IN AN ORGANIZED HEALTH CARE EDUCATION/TRAINING PROGRAM

## 2025-02-11 PROCEDURE — 3048F LDL-C <100 MG/DL: CPT | Performed by: STUDENT IN AN ORGANIZED HEALTH CARE EDUCATION/TRAINING PROGRAM

## 2025-02-11 RX ORDER — INSULIN ASPART 100 [IU]/ML
INJECTION, SOLUTION INTRAVENOUS; SUBCUTANEOUS
Qty: 60 ML | Refills: 3 | Status: SHIPPED | OUTPATIENT
Start: 2025-02-11 | End: 2025-02-11 | Stop reason: SDUPTHER

## 2025-02-11 RX ORDER — INSULIN ASPART 100 [IU]/ML
INJECTION, SOLUTION INTRAVENOUS; SUBCUTANEOUS
Qty: 75 ML | Refills: 3 | Status: SHIPPED | OUTPATIENT
Start: 2025-02-11

## 2025-02-11 RX ORDER — TIRZEPATIDE 15 MG/.5ML
15 INJECTION, SOLUTION SUBCUTANEOUS
Qty: 6 ML | Refills: 1 | Status: SHIPPED | OUTPATIENT
Start: 2025-02-11

## 2025-02-11 ASSESSMENT — PATIENT HEALTH QUESTIONNAIRE - PHQ9
2. FEELING DOWN, DEPRESSED OR HOPELESS: NOT AT ALL
1. LITTLE INTEREST OR PLEASURE IN DOING THINGS: NOT AT ALL
SUM OF ALL RESPONSES TO PHQ9 QUESTIONS 1 AND 2: 0

## 2025-02-11 ASSESSMENT — ENCOUNTER SYMPTOMS
OCCASIONAL FEELINGS OF UNSTEADINESS: 0
DEPRESSION: 0
LOSS OF SENSATION IN FEET: 0

## 2025-02-11 NOTE — PATIENT INSTRUCTIONS
Continue Toujeo 56 units every morning  Humalog:  Mon-Thur: 12 units before each meal + your sliding scale  Fri-Sun: 20 units before each meal + sliding scale  Take the Humalog as long as your premeal blood sugar is > 80  Continue Jardiance  Increase Mounjaro 15 mg, once a week

## 2025-02-14 ENCOUNTER — PHARMACY VISIT (OUTPATIENT)
Dept: PHARMACY | Facility: CLINIC | Age: 60
End: 2025-02-14
Payer: MEDICARE

## 2025-02-23 DIAGNOSIS — E55.9 VITAMIN D DEFICIENCY: ICD-10-CM

## 2025-02-24 RX ORDER — ERGOCALCIFEROL 1.25 MG/1
1 CAPSULE ORAL
Qty: 4 CAPSULE | Refills: 5 | Status: SHIPPED | OUTPATIENT
Start: 2025-03-02

## 2025-02-25 PROCEDURE — RXMED WILLOW AMBULATORY MEDICATION CHARGE

## 2025-02-26 ENCOUNTER — PHARMACY VISIT (OUTPATIENT)
Dept: PHARMACY | Facility: CLINIC | Age: 60
End: 2025-02-26
Payer: MEDICARE

## 2025-03-04 ENCOUNTER — APPOINTMENT (OUTPATIENT)
Dept: PRIMARY CARE | Facility: CLINIC | Age: 60
End: 2025-03-04
Payer: COMMERCIAL

## 2025-03-04 VITALS — WEIGHT: 315 LBS | DIASTOLIC BLOOD PRESSURE: 84 MMHG | SYSTOLIC BLOOD PRESSURE: 142 MMHG | BODY MASS INDEX: 35.47 KG/M2

## 2025-03-04 DIAGNOSIS — H65.112 SUBACUTE ALLERGIC OTITIS MEDIA OF LEFT EAR, RECURRENCE NOT SPECIFIED: Primary | ICD-10-CM

## 2025-03-04 PROCEDURE — 3060F POS MICROALBUMINURIA REV: CPT | Performed by: INTERNAL MEDICINE

## 2025-03-04 PROCEDURE — 3077F SYST BP >= 140 MM HG: CPT | Performed by: INTERNAL MEDICINE

## 2025-03-04 PROCEDURE — 3048F LDL-C <100 MG/DL: CPT | Performed by: INTERNAL MEDICINE

## 2025-03-04 PROCEDURE — 3051F HG A1C>EQUAL 7.0%<8.0%: CPT | Performed by: INTERNAL MEDICINE

## 2025-03-04 PROCEDURE — 3079F DIAST BP 80-89 MM HG: CPT | Performed by: INTERNAL MEDICINE

## 2025-03-04 PROCEDURE — 1036F TOBACCO NON-USER: CPT | Performed by: INTERNAL MEDICINE

## 2025-03-04 PROCEDURE — 99213 OFFICE O/P EST LOW 20 MIN: CPT | Performed by: INTERNAL MEDICINE

## 2025-03-04 PROCEDURE — 4010F ACE/ARB THERAPY RXD/TAKEN: CPT | Performed by: INTERNAL MEDICINE

## 2025-03-04 RX ORDER — AMOXICILLIN AND CLAVULANATE POTASSIUM 875; 125 MG/1; MG/1
875 TABLET, FILM COATED ORAL 2 TIMES DAILY
Qty: 20 TABLET | Refills: 0 | Status: SHIPPED | OUTPATIENT
Start: 2025-03-04 | End: 2025-03-14

## 2025-03-04 NOTE — PROGRESS NOTES
Subjective   Patient ID: Franck Mckinley is a 59 y.o. male who presents for Follow-up (4 month fuv //Ear ache in left ear X1 1/2 week, during day it plugs up and then at night it is damp. ).    HPI  Patient in for a visit  Left ear uncomfortable   Had a fever two weeks ago, flu type sinus congestion   No otc just brandon vapo to clean his nose uses it daily  No ill contacts no travel no swimming   His sugars went up over the holidays   He is still on mounjaro and [facility]o inc his novolog     Review of Systems  General: see hpi  Ears, Nose, Throat : see hpi  Dermatologic: Negative for new skin conditions, rash  Respiratory: see hpi  Cardiovascular: Negative for chest pain, palpitations, or leg swelling  Gastrointestinal: Negative for nausea/vomiting, abdominal pain, changes in bowel habits  Neurological: Negative for dizziness see hpi headaches    Previous history  Past Medical History:   Diagnosis Date    Abnormal finding of blood chemistry, unspecified 06/21/2017    Abnormal blood chemistry    Acute pharyngitis, unspecified 11/11/2019    Pharyngitis    Acute pharyngitis, unspecified 08/18/2020    Sore throat    Anosmia 04/19/2021    Anosmia    Calculus of kidney 01/04/2021    Kidney stone on right side    Cellulitis, unspecified 12/21/2022    Cellulitis    Chronic sinusitis, unspecified 04/26/2018    Sinusitis    Cough, unspecified 04/19/2021    Cough    Disorder of kidney and ureter, unspecified 11/14/2022    Renal insufficiency    Dorsalgia, unspecified 12/22/2020    Back pain    Edema, unspecified 11/16/2022    Edema    Encounter for screening for malignant neoplasm of colon 02/11/2020    Colon cancer screening    Encounter for screening for malignant neoplasm of prostate 01/19/2022    Screening for prostate cancer    Encounter for screening for malignant neoplasm of prostate 05/19/2020    Encounter for prostate cancer screening    Essential (primary) hypertension 11/16/2022    Benign essential hypertension     Fever, unspecified 04/19/2021    Fever    Follicular cyst of the skin and subcutaneous tissue, unspecified 11/11/2019    Infected cyst of skin    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    Low HDL (under 40)    Liver disease, unspecified 01/27/2021    Liver lesion    Male erectile dysfunction, unspecified 02/07/2020    Erectile dysfunction    Myalgia, unspecified site 08/18/2020    Myalgia    Other chest pain 01/19/2022    Chest discomfort    Other conditions influencing health status 01/19/2022    Skin tear    Other fatigue 03/21/2019    Fatigue    Other malaise 08/18/2020    Malaise and fatigue    Other nonspecific abnormal finding of lung field 02/10/2021    Abnormal CT scan, lung    Pain in right knee 06/26/2020    Right knee pain    Pain in right shoulder 03/30/2022    Right shoulder pain    Proteinuria, unspecified 11/14/2022    Proteinuria    Proteinuria, unspecified 11/13/2015    Proteinuria    Type 2 diabetes mellitus without complications (Multi) 12/22/2022    Diabetes mellitus    Unspecified abdominal pain 05/26/2022    Abdominal pain    Unspecified open wound of unspecified toe(s) without damage to nail, initial encounter 03/16/2020    Wound, open, toe     Past Surgical History:   Procedure Laterality Date    AMPUTATION Left     left 2nd toe    HAND SURGERY  04/04/2014    Hand Surgery                                                                                                                                                          KNEE ARTHROPLASTY  04/04/2014    Knee Arthroplasty    OTHER SURGICAL HISTORY  06/17/2020    Colonoscopy     Social History     Tobacco Use    Smoking status: Former     Types: Cigarettes    Smokeless tobacco: Never   Vaping Use    Vaping status: Never Used   Substance Use Topics    Alcohol use: Not Currently    Drug use: Never     Family History   Problem Relation Name Age of Onset    Kidney cancer Mother      No Known Problems Daughter      No Known Problems Daughter    "    Allergies   Allergen Reactions    Chocolate Unknown     Current Outpatient Medications   Medication Instructions    acetaminophen (Tylenol) 325 mg tablet 2 tablets, Every 4 hours PRN    amLODIPine (NORVASC) 10 mg, oral, Daily    atomoxetine (STRATTERA) 10 mg, oral, Daily, Swallow capsule whole; do not open. If opened accidentally, do not touch eyes; wash hands immediately (product is an eye irritant).    cholecalciferol (Vitamin D-3) 25 MCG (1000 UT) tablet 1 tablet, Daily    cyanocobalamin (Vitamin B-12) 1,000 mcg/mL injection 2 mL    ergocalciferol (VITAMIN D-2) 1,250 mcg, oral, Once Weekly    fenofibrate (TRICOR) 48 mg, oral, Daily, as directed    insulin aspart (NovoLOG Flexpen U-100 Insulin) 100 unit/mL (3 mL) pen Inject 12 units before each meal + sliding scale on Mon-Thur; Inject 20 units before each meal plus sliding scale on Fri-sun. Maximum daily dose 80 units    insulin glargine (Toujeo SoloStar U-300 Insulin) 300 unit/mL (1.5 mL) injection INJECT 56 UNITS SUBCUTANEOUSLY AT BEDTIME    Jardiance 25 mg, oral, Daily    losartan (COZAAR) 100 mg, oral, Daily    Mounjaro 15 mg, subcutaneous, Once Weekly    pen needle, diabetic (BD Ultra-Fine Mini Pen Needle) 31 gauge x 3/16\" needle Use to inject insulin 4 times daily    pravastatin (PRAVACHOL) 20 mg, oral, Nightly    sildenafil (VIAGRA) 100 mg, oral, As needed    testosterone 20.25 mg/1.25 gram (1.62 %) gel in metered-dose pump 4 Pump, transdermal, Daily, 2 pumps to each shoulder    testosterone 20.25 mg/1.25 gram (1.62 %) gel in metered-dose pump Place 4 Pump (2 pumps to each shoulder) on the skin once daily.       Objective       Physical Exam  Vital Signs: as recorded above  General: Well groomed, well nourished   Orientation:  Alert , oriented to time, place , and person   Mood and Affect:  Cooperative , no apparent distress normal affect  Skin: Good color, good turgor  Eyes: Extra ocular muscle movements intact, anicteric sclerae  Neck: Supple, full " range of movement  Chest: Normal breath sounds, normal chest wall exam, symmetric, good air entry, clear to auscultation  Heart: Regular rate and rhythm, without murmur, gallop, or rubs  BACK:  no CTLS spine tenderness, no flank tenderness  Extremities: full range of movement  bilateral UE and bilateral LE,  no lower extremity edema  Neurological: Alert, oriented, cranial nerves II-XII grossly intact except for visual acuity  Sensation:  Intact   Gait: normal steady      Assessment/Plan   Franck Mckinley is a 59 y.o. male who presents for the concerns below:    Problem List Items Addressed This Visit    None  OTITIS MEDIA PLAN: Supportive care, plenty of fluids, Tylenol or Ibuprofen as needed with food, OTC decongestants , if with elevated blood pressure use prescribed steroid nasal spray, antibiotics if appropriate to treat bacterial etiology, Amoxicillin (if PCN allergic - Doxycycline or erythromycin group), frequent handwashing, sanitize surrounding objects, do not use Qtips or ear manipulation. Birth control precautions if OCP in use. If any problems arise patient to call or come back in.  Diabetes changes with endo   Androgen deficiency so now on gel no shots he is careful         Discussed with:   Return in :  May for cpe    Portions of this note were generated using digital voice recognition software, and may contain grammatical errors       Piper Alvarado MD  03/04/25  10:07 AM

## 2025-03-06 ENCOUNTER — APPOINTMENT (OUTPATIENT)
Dept: PRIMARY CARE | Facility: CLINIC | Age: 60
End: 2025-03-06
Payer: COMMERCIAL

## 2025-03-06 DIAGNOSIS — E53.8 B12 DEFICIENCY: ICD-10-CM

## 2025-03-06 RX ORDER — CYANOCOBALAMIN 1000 UG/ML
1000 INJECTION, SOLUTION INTRAMUSCULAR; SUBCUTANEOUS ONCE
Status: COMPLETED | OUTPATIENT
Start: 2025-03-06 | End: 2025-03-06

## 2025-03-06 RX ADMIN — CYANOCOBALAMIN 1000 MCG: 1000 INJECTION, SOLUTION INTRAMUSCULAR; SUBCUTANEOUS at 08:19

## 2025-03-07 DIAGNOSIS — I10 BENIGN ESSENTIAL HYPERTENSION: ICD-10-CM

## 2025-03-07 PROCEDURE — RXMED WILLOW AMBULATORY MEDICATION CHARGE

## 2025-03-07 RX ORDER — AMLODIPINE BESYLATE 10 MG/1
10 TABLET ORAL DAILY
Qty: 90 TABLET | Refills: 1 | Status: SHIPPED | OUTPATIENT
Start: 2025-03-07

## 2025-03-08 ENCOUNTER — PHARMACY VISIT (OUTPATIENT)
Dept: PHARMACY | Facility: CLINIC | Age: 60
End: 2025-03-08
Payer: MEDICARE

## 2025-03-26 PROCEDURE — RXMED WILLOW AMBULATORY MEDICATION CHARGE

## 2025-03-28 ENCOUNTER — PHARMACY VISIT (OUTPATIENT)
Dept: PHARMACY | Facility: CLINIC | Age: 60
End: 2025-03-28
Payer: MEDICARE

## 2025-04-01 ENCOUNTER — APPOINTMENT (OUTPATIENT)
Dept: UROLOGY | Facility: CLINIC | Age: 60
End: 2025-04-01
Payer: COMMERCIAL

## 2025-04-01 VITALS — WEIGHT: 315 LBS | HEIGHT: 78 IN | BODY MASS INDEX: 36.45 KG/M2

## 2025-04-01 DIAGNOSIS — E29.1 HYPOGONADISM IN MALE: Primary | ICD-10-CM

## 2025-04-01 LAB
ESTRADIOL SERPL-MCNC: 48 PG/ML
HCT VFR BLD AUTO: 52.7 % (ref 38.5–50)
LH SERPL-ACNC: 0.3 MIU/ML (ref 1.5–9.3)
PSA SERPL-MCNC: 0.89 NG/ML
TESTOST SERPL-MCNC: 264 NG/DL (ref 250–827)

## 2025-04-01 PROCEDURE — 3060F POS MICROALBUMINURIA REV: CPT | Performed by: PHYSICIAN ASSISTANT

## 2025-04-01 PROCEDURE — G2211 COMPLEX E/M VISIT ADD ON: HCPCS | Performed by: PHYSICIAN ASSISTANT

## 2025-04-01 PROCEDURE — 99214 OFFICE O/P EST MOD 30 MIN: CPT | Performed by: PHYSICIAN ASSISTANT

## 2025-04-01 PROCEDURE — 4010F ACE/ARB THERAPY RXD/TAKEN: CPT | Performed by: PHYSICIAN ASSISTANT

## 2025-04-01 PROCEDURE — 3051F HG A1C>EQUAL 7.0%<8.0%: CPT | Performed by: PHYSICIAN ASSISTANT

## 2025-04-01 PROCEDURE — 3048F LDL-C <100 MG/DL: CPT | Performed by: PHYSICIAN ASSISTANT

## 2025-04-01 PROCEDURE — 3008F BODY MASS INDEX DOCD: CPT | Performed by: PHYSICIAN ASSISTANT

## 2025-04-01 PROCEDURE — 1036F TOBACCO NON-USER: CPT | Performed by: PHYSICIAN ASSISTANT

## 2025-04-01 RX ORDER — TESTOSTERONE 20.25 MG/1.25G
4 GEL TOPICAL DAILY
Qty: 150 G | Refills: 1 | Status: SHIPPED | OUTPATIENT
Start: 2025-04-01

## 2025-04-01 ASSESSMENT — ENCOUNTER SYMPTOMS
ENDOCRINE NEGATIVE: 1
GASTROINTESTINAL NEGATIVE: 1
NEUROLOGICAL NEGATIVE: 1
ALLERGIC/IMMUNOLOGIC NEGATIVE: 1
PSYCHIATRIC NEGATIVE: 1
CARDIOVASCULAR NEGATIVE: 1
EYES NEGATIVE: 1
RESPIRATORY NEGATIVE: 1
MUSCULOSKELETAL NEGATIVE: 1
HEMATOLOGIC/LYMPHATIC NEGATIVE: 1
CONSTITUTIONAL NEGATIVE: 1

## 2025-04-01 NOTE — PROGRESS NOTES
Subjective   Patient ID: Franck Mckinley is a 59 y.o. male who presents for Erectile Dysfunction.  Erectile Dysfunction      Patient is a 60 yo male with hypogonadism, on 2 pumps on each shoulder, previously on testosterone cipionate, but stopped due to polycythemia currently doing blood donations every 3 months, ED on viagra 100 mg seen for a follow up.     Patient reports his energy level is not as high compared to when he was doing injections. He reports feeling low energy in middle of the day.     He was diagnosed with severe sleep apnea and currently sleeps with a CPAP which helped.     Orders Only on 03/31/2025   Component Date Value Ref Range Status    HEMATOCRIT 03/31/2025 52.7 (H)  38.5 - 50.0 % Final    LH 03/31/2025 0.3 (L)  1.5 - 9.3 mIU/mL Final    TESTOSTERONE, TOTAL, MALES (ADULT)* 03/31/2025 264  250 - 827 ng/dL Final    ESTRADIOL 03/31/2025 48 (H)  < OR = 39 pg/mL Final    Comment: Reference range established on post-pubertal patient  population. No pre-pubertal reference range  established using this assay. For any patients for  whom low Estradiol levels are anticipated (e.g. males,  pre-pubertal children and hypogonadal/post-menopausal   females), the [employer] [facility]  Estradiol, Ultrasensitive, LCMSMS assay is recommended  (order code 86016).      Please note: patients being treated with the drug   fulvestrant (Faslodex(R)) have demonstrated significant   interference in immunoassay methods for estradiol   measurement. The cross reactivity could lead to falsely   elevated estradiol test results leading to an   inappropriate clinical assessment of estrogen status.  [employer] order code 30289-Estradiol,   Ultrasensitive LC/MS/MS demonstrates negligible cross   reactivity with fulvestrant.      PSA, TOTAL 03/31/2025 0.89  < OR = 4.00 ng/mL Final    Comment: The total PSA value from this assay system is   standardized against the WHO standard. The test   result will be  approximately 20% lower when compared   to the equimolar-standardized total PSA (Veronica   Jon). Comparison of serial PSA results should be   interpreted with this fact in mind.     This test was performed using the Siemens   chemiluminescent method. Values obtained from   different assay methods cannot be used  interchangeably. PSA levels, regardless of  value, should not be interpreted as absolute  evidence of the presence or absence of disease.         Review of Systems   Constitutional: Negative.    HENT: Negative.     Eyes: Negative.    Respiratory: Negative.     Cardiovascular: Negative.    Gastrointestinal: Negative.    Endocrine: Negative.    Genitourinary: Negative.    Musculoskeletal: Negative.    Skin: Negative.    Allergic/Immunologic: Negative.    Neurological: Negative.    Hematological: Negative.    Psychiatric/Behavioral: Negative.         Objective   Physical Exam  Constitutional:       General: He is not in acute distress.     Appearance: Normal appearance.   HENT:      Head: Normocephalic and atraumatic.      Nose: Nose normal.      Mouth/Throat:      Mouth: Mucous membranes are moist.   Pulmonary:      Effort: Pulmonary effort is normal.   Musculoskeletal:      Cervical back: Normal range of motion.   Neurological:      Mental Status: He is alert.         Assessment/Plan     Hypogonadism  Polycythemia   ED    I discussed his Testosterone is in the lower end of normal which could be why he is feeling tired at the end of the day.   I advised him to follow up with Dr VERDUZCO To discuss different treatment modalities.   Prescription of testosterone sent to pharmacy.    I discussed his hematocrit levels are still elevated. He missed his last blood donation because he was feeling sick.   He will try to go in next couple of days.    Follow up with Dr Paris Vallejo PA-C 04/01/25 10:08 AM

## 2025-04-02 ENCOUNTER — APPOINTMENT (OUTPATIENT)
Dept: DERMATOLOGY | Facility: CLINIC | Age: 60
End: 2025-04-02
Payer: COMMERCIAL

## 2025-04-02 DIAGNOSIS — D22.9 MULTIPLE BENIGN MELANOCYTIC NEVI: ICD-10-CM

## 2025-04-02 DIAGNOSIS — Z12.83 SCREENING EXAM FOR SKIN CANCER: ICD-10-CM

## 2025-04-02 DIAGNOSIS — L90.5 SCAR CONDITIONS AND FIBROSIS OF SKIN: ICD-10-CM

## 2025-04-02 DIAGNOSIS — L82.1 SEBORRHEIC KERATOSES: ICD-10-CM

## 2025-04-02 DIAGNOSIS — D18.01 CHERRY ANGIOMA: ICD-10-CM

## 2025-04-02 DIAGNOSIS — L57.0 ACTINIC KERATOSIS: ICD-10-CM

## 2025-04-02 DIAGNOSIS — D48.5 NEOPLASM OF UNCERTAIN BEHAVIOR OF SKIN: Primary | ICD-10-CM

## 2025-04-02 PROCEDURE — 1036F TOBACCO NON-USER: CPT | Performed by: DERMATOLOGY

## 2025-04-02 PROCEDURE — 17000 DESTRUCT PREMALG LESION: CPT

## 2025-04-02 PROCEDURE — 99213 OFFICE O/P EST LOW 20 MIN: CPT | Performed by: DERMATOLOGY

## 2025-04-02 PROCEDURE — 11301 SHAVE SKIN LESION 0.6-1.0 CM: CPT

## 2025-04-02 PROCEDURE — 3048F LDL-C <100 MG/DL: CPT | Performed by: DERMATOLOGY

## 2025-04-02 PROCEDURE — 11302 SHAVE SKIN LESION 1.1-2.0 CM: CPT

## 2025-04-02 PROCEDURE — 4010F ACE/ARB THERAPY RXD/TAKEN: CPT | Performed by: DERMATOLOGY

## 2025-04-02 PROCEDURE — 3051F HG A1C>EQUAL 7.0%<8.0%: CPT | Performed by: DERMATOLOGY

## 2025-04-02 PROCEDURE — 3060F POS MICROALBUMINURIA REV: CPT | Performed by: DERMATOLOGY

## 2025-04-02 PROCEDURE — 17003 DESTRUCT PREMALG LES 2-14: CPT

## 2025-04-02 ASSESSMENT — DERMATOLOGY PATIENT ASSESSMENT
DO YOU USE A TANNING BED: NO
DO YOU USE SUNSCREEN: OCCASIONALLY
DO YOU HAVE ANY NEW OR CHANGING LESIONS: NO
HAVE YOU HAD OR DO YOU HAVE A STAPH INFECTION: NO
ARE YOU AN ORGAN TRANSPLANT RECIPIENT: NO
HAVE YOU HAD OR DO YOU HAVE VASCULAR DISEASE: NO

## 2025-04-02 ASSESSMENT — PATIENT GLOBAL ASSESSMENT (PGA): PATIENT GLOBAL ASSESSMENT: PATIENT GLOBAL ASSESSMENT:  1 - CLEAR

## 2025-04-02 ASSESSMENT — DERMATOLOGY QUALITY OF LIFE (QOL) ASSESSMENT
RATE HOW EMOTIONALLY BOTHERED YOU ARE BY YOUR SKIN PROBLEM (FOR EXAMPLE, WORRY, EMBARRASSMENT, FRUSTRATION): 0 - NEVER BOTHERED
DATE THE QUALITY-OF-LIFE ASSESSMENT WAS COMPLETED: 67297
RATE HOW BOTHERED YOU ARE BY EFFECTS OF YOUR SKIN PROBLEMS ON YOUR ACTIVITIES (EG, GOING OUT, ACCOMPLISHING WHAT YOU WANT, WORK ACTIVITIES OR YOUR RELATIONSHIPS WITH OTHERS): 0 - NEVER BOTHERED
RATE HOW BOTHERED YOU ARE BY SYMPTOMS OF YOUR SKIN PROBLEM (EG, ITCHING, STINGING BURNING, HURTING OR SKIN IRRITATION): 0 - NEVER BOTHERED
ARE THERE EXCLUSIONS OR EXCEPTIONS FOR THE QUALITY OF LIFE ASSESSMENT: NO

## 2025-04-02 ASSESSMENT — ITCH NUMERIC RATING SCALE: HOW SEVERE IS YOUR ITCHING?: 0

## 2025-04-02 NOTE — PROGRESS NOTES
Subjective     Franck Mckinley is a 59 y.o. male who presents for the following: Skin Check (No particular concerns today voiced by Franck. No family h/o skin cancers. ). He was last seen August 2022 for a full body skin exam. Patient has a history of dysplastic nevi (all mild), greater than 100 moles on prior skin exams.    Skin Cancer History  No skin cancer on file.    Review of Systems:  No other skin or systemic complaints other than what is documented elsewhere in the note.    The following portions of the chart were reviewed this encounter and updated as appropriate:       Specialty Problems          Dermatology Problems    Hemangioma of skin and subcutaneous tissue    Melanocytic nevi of other parts of face    Melanocytic nevi of right lower limb, including hip    Melanocytic nevi of scalp and neck    Melanocytic nevi of trunk    Melanocytic nevi of unspecified part of face    Melanocytic nevi of unspecified upper limb, including shoulder    Neoplasm of uncertain behavior of skin    Scar condition and fibrosis of skin    Infected cyst of skin    Onychomycosis    Wound, open, toe     Past Medical History:  Franck Mckinley  has a past medical history of Abnormal finding of blood chemistry, unspecified (06/21/2017), Acute pharyngitis, unspecified (11/11/2019), Acute pharyngitis, unspecified (08/18/2020), Anosmia (04/19/2021), Calculus of kidney (01/04/2021), Cellulitis, unspecified (12/21/2022), Chronic sinusitis, unspecified (04/26/2018), Cough, unspecified (04/19/2021), Disorder of kidney and ureter, unspecified (11/14/2022), Dorsalgia, unspecified (12/22/2020), Edema, unspecified (11/16/2022), Encounter for screening for malignant neoplasm of colon (02/11/2020), Encounter for screening for malignant neoplasm of prostate (01/19/2022), Encounter for screening for malignant neoplasm of prostate (05/19/2020), Essential (primary) hypertension (11/16/2022), Fever, unspecified (04/19/2021), Follicular cyst  of the skin and subcutaneous tissue, unspecified (11/11/2019), 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), Liver disease, unspecified (01/27/2021), Male erectile dysfunction, unspecified (02/07/2020), Myalgia, unspecified site (08/18/2020), Other chest pain (01/19/2022), Other conditions influencing health status (01/19/2022), Other fatigue (03/21/2019), Other malaise (08/18/2020), Other nonspecific abnormal finding of lung field (02/10/2021), Pain in right knee (06/26/2020), Pain in right shoulder (03/30/2022), Proteinuria, unspecified (11/14/2022), Proteinuria, unspecified (11/13/2015), Type 2 diabetes mellitus without complications (12/22/2022), Unspecified abdominal pain (05/26/2022), and Unspecified open wound of unspecified toe(s) without damage to nail, initial encounter (03/16/2020).    Past Surgical History:  Franck Mckinley  has a past surgical history that includes Knee Arthroplasty (04/04/2014); Hand surgery (04/04/2014); Other surgical history (06/17/2020); and Amputation (Left).    Family History:  Patient family history includes Kidney cancer in his mother; No Known Problems in his daughter and daughter.    Social History:  Franck Mckinley  reports that he has quit smoking. His smoking use included cigarettes. He has never used smokeless tobacco. He reports that he does not currently use alcohol. He reports that he does not use drugs.    Allergies:  Chocolate    Current Medications / CAM's:    Current Outpatient Medications:     acetaminophen (Tylenol) 325 mg tablet, Take 2 tablets (650 mg) by mouth every 4 hours if needed (pain)., Disp: , Rfl:     amLODIPine (Norvasc) 10 mg tablet, TAKE 1 TABLET DAILY, Disp: 90 tablet, Rfl: 1    atomoxetine (Strattera) 10 mg capsule, Take 1 capsule (10 mg) by mouth once daily. Swallow capsule whole; do not open. If opened accidentally, do not touch eyes; wash hands immediately (product is an eye irritant)., Disp: 30 capsule, Rfl: 3    cholecalciferol (Vitamin D-3) 25  "MCG (1000 UT) tablet, Take 1 tablet (25 mcg) by mouth once daily., Disp: , Rfl:     cyanocobalamin (Vitamin B-12) 1,000 mcg/mL injection, Inject 2 mL (2,000 mcg) into the muscle. per AQ., Disp: , Rfl:     ergocalciferol (Vitamin D-2) 1250 mcg (50,000 units) capsule, TAKE 1 CAPSULE BY MOUTH ONE TIME PER WEEK, Disp: 4 capsule, Rfl: 5    fenofibrate (Tricor) 48 mg tablet, Take 1 tablet (48 mg) by mouth once daily. as directed, Disp: 90 tablet, Rfl: 3    insulin aspart (NovoLOG Flexpen U-100 Insulin) 100 unit/mL (3 mL) pen, Inject 12 units before each meal + sliding scale on Mon-Thur; Inject 20 units before each meal plus sliding scale on Fri-sun. Maximum daily dose 80 units, Disp: 75 mL, Rfl: 3    insulin glargine (Toujeo SoloStar U-300 Insulin) 300 unit/mL (1.5 mL) injection, INJECT 56 UNITS SUBCUTANEOUSLY AT BEDTIME, Disp: 18 mL, Rfl: 3    Jardiance 25 mg, Take 1 tablet (25 mg) by mouth once daily., Disp: 90 tablet, Rfl: 3    losartan (Cozaar) 100 mg tablet, Take 1 tablet (100 mg) by mouth once daily., Disp: 90 tablet, Rfl: 3    pen needle, diabetic (BD Ultra-Fine Mini Pen Needle) 31 gauge x 3/16\" needle, Use to inject insulin 4 times daily, Disp: 400 each, Rfl: 3    pravastatin (Pravachol) 20 mg tablet, Take 1 tablet (20 mg) by mouth once daily at bedtime., Disp: 90 tablet, Rfl: 3    testosterone 20.25 mg/1.25 gram (1.62 %) gel in metered-dose pump, Place 4 Pump (2 pumps to each shoulder) on the skin once daily., Disp: 150 g, Rfl: 2    testosterone 20.25 mg/1.25 gram (1.62 %) gel in metered-dose pump, Place 4 Pump on the skin once daily. 2 pumps to each shoulder, Disp: 150 g, Rfl: 1    tirzepatide (Mounjaro) 15 mg/0.5 mL pen injector, Inject 15 mg under the skin 1 (one) time per week., Disp: 6 mL, Rfl: 1    sildenafil (Viagra) 100 mg tablet, Take 1 tablet (100 mg) by mouth if needed for erectile dysfunction (Start with half tab. Take 1 hour prior to intercourse on an empty stomach. If you have an erection for over 4 " hours, please go to the emergency room.)., Disp: 30 tablet, Rfl: 6    Current Facility-Administered Medications:     cyanocobalamin (Vitamin B-12) injection 1,000 mcg, 1,000 mcg, intramuscular, Once, Piper Alvarado MD     Objective   Well appearing patient in no apparent distress; mood and affect are within normal limits.    A full examination was performed including scalp, head, eyes, ears, nose, lips, neck, chest, axillae, abdomen, back, buttocks, bilateral upper extremities, bilateral lower extremities, hands, feet, fingers, toes, fingernails, and toenails. Patient declined genital and gluteal cleft exam. Unable to assess right foot and ankle due to injury and bandaging. All findings within normal limits unless otherwise noted below.    - scattered regular brown macules and papules    - Well healed scar at prior treatment sites without visual or palpable evidence of recurrence.     - Scattered waxy tan/grey/brown papules with horn cysts    - scattered small bright red papules and macules    Right Flank  There are two brown macules (total size 1cm with 1-2mm margins) with central pink clearing and irregular globules of pigment at the periphery of both macules, bisected for tissue processing                  Left Abdomen (side) - Upper  5mm brown macule with irregular peripheral pigment               Mid Tip of Nose  Pink 3mm dome shaped papule without telangiectasias          Mid Parietal Scalp (2), Right Forehead  Erythematous macules with gritty scale.         Assessment/Plan   Neoplasm of uncertain behavior of skin (3)  Right Flank    Shave removal    Lesion length (cm):  1  Lesion width (cm):  0.8  Margin per side (cm):  0.2  Lesion diameter (cm):  1.2  Informed consent: discussed and consent obtained    Timeout: patient name, date of birth, surgical site, and procedure verified    Procedure prep:  Patient was prepped and draped  Anesthesia: the lesion was anesthetized in a standard fashion     Anesthetic:  1% lidocaine w/ epinephrine 1-100,000 local infiltration  Instrument used: DermaBlade    Hemostasis achieved with: aluminum chloride    Outcome: patient tolerated procedure well    Post-procedure details: sterile dressing applied and wound care instructions given    Dressing type: bandage and petrolatum      Specimen 1 - Dermatopathology- DERM LAB  Differential Diagnosis: favor dysplastic nevus, rule out melanoma  Check Margins Yes/No?:    Comments:  Specimen is 2 melanocytic lesions, specimens is bisected please put cut edge down in the block  Dermpath Lab: Routine Histopathology (formalin-fixed tissue)    Left Abdomen (side) - Upper    Shave removal    Lesion length (cm):  0.5  Lesion width (cm):  0.5  Margin per side (cm):  0.1  Lesion diameter (cm):  0.7  Informed consent: discussed and consent obtained    Timeout: patient name, date of birth, surgical site, and procedure verified    Procedure prep:  Patient was prepped and draped  Anesthesia: the lesion was anesthetized in a standard fashion    Anesthetic:  1% lidocaine w/ epinephrine 1-100,000 local infiltration  Instrument used: DermaBlade    Hemostasis achieved with: aluminum chloride    Outcome: patient tolerated procedure well    Post-procedure details: sterile dressing applied and wound care instructions given    Dressing type: bandage and petrolatum      Specimen 2 - Dermatopathology- DERM LAB  Differential Diagnosis: favor dysplastic nevus, rule out melanoma  Check Margins Yes/No?:    Comments:    Dermpath Lab: Routine Histopathology (formalin-fixed tissue)    Mid Tip of Nose    Lesion on mid tip of nose: Photos taken today for observation, favor fibrous papule due to lesion morphology and dermatoscopic examination without obvious telangiectasia.    Right flank lesion- both adjacent melanocytic lesions have changed from prior photos; taken as one scoop shave given their proximity    Actinic keratosis (3)  Mid Parietal Scalp (2); Right  Forehead    - The premalignant nature of the disorder was reviewed and treatment options were reviewed.   - Patient agreeable to treatment with cryotherapy today.  Sites confirmed. Risks and benefits reviewed including but not limited to pain, redness, swelling, blister, scab, healing with hypo or hyperpigmentation, and scar. Chance of recurrence or persistence reviewed.     Destr of lesion - Mid Parietal Scalp (2), Right Forehead  Complexity: simple    Destruction method: cryotherapy    Informed consent: discussed and consent obtained    Lesion destroyed using liquid nitrogen: Yes    Region frozen until ice ball extended beyond lesion: Yes    Cryotherapy cycles:  1  Outcome: patient tolerated procedure well with no complications    Post-procedure details: wound care instructions given      Multiple benign melanocytic nevi    Benign melanocytic nevi  - Discussed benign nature and that no treatment is necessary unless it becomes painful or increases in size. Patient opts for clinical monitoring at this time.    - Sun protective behavior reviewed and encouraged including the use of over-the-counter sunscreen with SPF30+ daily (reapply every 1.5 hours when outdoors), UPF clothing, broad rimmed hats, sunglasses, and avoidance of midday sun. Home skin monitoring encouraged and how to monitor for skin cancer (changing or new moles, new rapidly growing or non-healing lesions) reviewed. Patient encouraged to call with interval concerns or changes.      Scar conditions and fibrosis of skin    - Well healed scar(s) at sites of prior dysplastic nevi on the right upper back and left anterior shoulder  - Sun protective behavior reviewed and encouraged including the use of over-the-counter sunscreen with SPF30+ daily (reapply every 1.5 hours when outdoors), UPF clothing, broad rimmed hats, sunglasses, and avoidance of midday sun. Home skin monitoring encouraged and how to monitor for skin cancer (changing or new moles, new  rapidly growing or non-healing lesions) reviewed. Patient encouraged to call with interval concerns or changes.       Seborrheic keratoses    Seborrheic keratosis (-es)  - Discussed benign nature and that no treatment is necessary unless it becomes painful or increases in size. Patient opts for clinical monitoring at this time.      Cherry angioma    Cherry angioma(s)  - Discussed benign nature and that no treatment is necessary unless it becomes painful or increases in size. Patient opts for clinical monitoring at this time.      Screening exam for skin cancer       Follow up total body skin exam pending biopsy results, will notify patient of results via MyChart    Sloane Rojas MD  Department of Dermatology    I saw and evaluated the patient, participating in the key elements of the service.  I discussed the findings, assessment and plan with the resident and agree with resident’s findings and plan as documented in the resident's note.  I was immediately available for the entirety of the procedure(s) and present for the key and critical portions.     Елена Castillo MD

## 2025-04-07 ENCOUNTER — APPOINTMENT (OUTPATIENT)
Dept: PRIMARY CARE | Facility: CLINIC | Age: 60
End: 2025-04-07
Payer: COMMERCIAL

## 2025-04-07 DIAGNOSIS — E53.9 VITAMIN B DEFICIENCY: ICD-10-CM

## 2025-04-07 LAB
LAB AP ASR DISCLAIMER: NORMAL
LABORATORY COMMENT REPORT: NORMAL
PATH REPORT.FINAL DX SPEC: NORMAL
PATH REPORT.GROSS SPEC: NORMAL
PATH REPORT.MICROSCOPIC SPEC OTHER STN: NORMAL
PATH REPORT.RELEVANT HX SPEC: NORMAL
PATH REPORT.TOTAL CANCER: NORMAL

## 2025-04-07 PROCEDURE — 96372 THER/PROPH/DIAG INJ SC/IM: CPT | Performed by: INTERNAL MEDICINE

## 2025-04-07 RX ORDER — CYANOCOBALAMIN 1000 UG/ML
1000 INJECTION, SOLUTION INTRAMUSCULAR; SUBCUTANEOUS ONCE
Status: COMPLETED | OUTPATIENT
Start: 2025-04-07 | End: 2025-04-07

## 2025-04-07 RX ADMIN — CYANOCOBALAMIN 1000 MCG: 1000 INJECTION, SOLUTION INTRAMUSCULAR; SUBCUTANEOUS at 08:22

## 2025-04-07 RX ADMIN — CYANOCOBALAMIN 1000 MCG: 1000 INJECTION, SOLUTION INTRAMUSCULAR; SUBCUTANEOUS at 08:24

## 2025-04-08 DIAGNOSIS — L81.8 ATYPICAL MELANOCYTIC HYPERPLASIA: Primary | ICD-10-CM

## 2025-04-10 ENCOUNTER — PHARMACY VISIT (OUTPATIENT)
Dept: PHARMACY | Facility: CLINIC | Age: 60
End: 2025-04-10
Payer: MEDICARE

## 2025-04-10 DIAGNOSIS — C43.9 MELANOMA OF SKIN (MULTI): ICD-10-CM

## 2025-04-10 PROCEDURE — RXMED WILLOW AMBULATORY MEDICATION CHARGE

## 2025-04-10 NOTE — TUMOR BOARD NOTE
General Patient Information  Name:  Franck Mckinley  Evaluation #:  1  Conference Date:  4/14/2025  YOB: 1965  MRN:  [id number]  Program Physician(s):  Sincere Johnson  Referring Physician(s):  Елена Castillo      Summary   Stage:      Assessment:  Atypical melanocytic hyperplasia of the right flank with concern for a melanoma in situ.    Recommendation:  WLE with 5 mm margins.    Review Multidisciplinary Cutaneous Oncology Conference recommendation with patient.  Continue routine follow up and total body skin exams with Елена Castillo.    Follow Up:  Елена Castillo, Derm Surgery.      History and Physical Exam  Dermatologic History:   59 y.o. male with a biopsy of the right flank on 4/2/2025 showing an atypical melanocytic hyperplasia of the right flank with concern for a melanoma in situ.    Past Medical History:    Past Medical History:   Diagnosis Date    Abnormal finding of blood chemistry, unspecified 06/21/2017    Abnormal blood chemistry    Acute pharyngitis, unspecified 11/11/2019    Pharyngitis    Acute pharyngitis, unspecified 08/18/2020    Sore throat    Anosmia 04/19/2021    Anosmia    Calculus of kidney 01/04/2021    Kidney stone on right side    Cellulitis, unspecified 12/21/2022    Cellulitis    Chronic sinusitis, unspecified 04/26/2018    Sinusitis    Cough, unspecified 04/19/2021    Cough    Disorder of kidney and ureter, unspecified 11/14/2022    Renal insufficiency    Dorsalgia, unspecified 12/22/2020    Back pain    Edema, unspecified 11/16/2022    Edema    Encounter for screening for malignant neoplasm of colon 02/11/2020    Colon cancer screening    Encounter for screening for malignant neoplasm of prostate 01/19/2022    Screening for prostate cancer    Encounter for screening for malignant neoplasm of prostate 05/19/2020    Encounter for prostate cancer screening    Essential (primary) hypertension 11/16/2022    Benign essential hypertension    Fever, unspecified 04/19/2021     Fever    Follicular cyst of the skin and subcutaneous tissue, unspecified 11/11/2019    Infected cyst of skin    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    Low HDL (under 40)    Liver disease, unspecified 01/27/2021    Liver lesion    Male erectile dysfunction, unspecified 02/07/2020    Erectile dysfunction    Myalgia, unspecified site 08/18/2020    Myalgia    Other chest pain 01/19/2022    Chest discomfort    Other conditions influencing health status 01/19/2022    Skin tear    Other fatigue 03/21/2019    Fatigue    Other malaise 08/18/2020    Malaise and fatigue    Other nonspecific abnormal finding of lung field 02/10/2021    Abnormal CT scan, lung    Pain in right knee 06/26/2020    Right knee pain    Pain in right shoulder 03/30/2022    Right shoulder pain    Proteinuria, unspecified 11/14/2022    Proteinuria    Proteinuria, unspecified 11/13/2015    Proteinuria    Type 2 diabetes mellitus without complications 12/22/2022    Diabetes mellitus    Unspecified abdominal pain 05/26/2022    Abdominal pain    Unspecified open wound of unspecified toe(s) without damage to nail, initial encounter 03/16/2020    Wound, open, toe         Pathology  Dermatopathology- DERM LAB: [id number]  Order: [id number]   Collected 4/2/2025 08:39       Status: Final result       Visible to patient: Yes (seen)       Dx: Neoplasm of uncertain behavior of skin    1 Result Note      Component    FINAL DIAGNOSIS   A: SKIN, RIGHT FLANK, SHAVE EXCISION:  ATYPICAL MELANOCYTIC HYPERPLASIA, PRESENT ON THE DEEP AND PERIPHERAL MARGIN, SEE NOTE.     Note: Microscopic examination reveals a specimen that extends into the mid reticular dermis. This specimen is partially tangentially sectioned. There is an asymmetric proliferation of nested and single melanocytes along the dermal-epidermal junction with moderate pagetoid extension. Less than seventy-five percent of the melanocytes stain with antibodies against PRAME although there are patchy areas of PRAME  positive melanocytes. A SOX-10 stain reveals moderate pagetoid extension. All control slides stain appropriately.     These findings are concerning for melanoma in situ and a complete re-excision with 5 mm margins is recommended.

## 2025-04-14 ENCOUNTER — TUMOR BOARD CONFERENCE (OUTPATIENT)
Dept: HEMATOLOGY/ONCOLOGY | Facility: HOSPITAL | Age: 60
End: 2025-04-14
Payer: COMMERCIAL

## 2025-04-15 DIAGNOSIS — Z12.11 SCREENING FOR COLON CANCER: ICD-10-CM

## 2025-04-15 RX ORDER — SODIUM, POTASSIUM,MAG SULFATES 17.5-3.13G
SOLUTION, RECONSTITUTED, ORAL ORAL
Qty: 360 ML | Refills: 0 | Status: SHIPPED | OUTPATIENT
Start: 2025-04-15

## 2025-04-18 DIAGNOSIS — E11.65 TYPE 2 DIABETES MELLITUS WITH HYPERGLYCEMIA, WITH LONG-TERM CURRENT USE OF INSULIN: ICD-10-CM

## 2025-04-18 DIAGNOSIS — Z79.4 TYPE 2 DIABETES MELLITUS WITH HYPERGLYCEMIA, WITH LONG-TERM CURRENT USE OF INSULIN: ICD-10-CM

## 2025-04-18 RX ORDER — PEN NEEDLE, DIABETIC 30 GX3/16"
NEEDLE, DISPOSABLE MISCELLANEOUS
Qty: 400 EACH | Refills: 1 | Status: SHIPPED | OUTPATIENT
Start: 2025-04-18

## 2025-04-23 DIAGNOSIS — F98.8 ATTENTION DEFICIT DISORDER, UNSPECIFIED TYPE: ICD-10-CM

## 2025-04-24 PROCEDURE — RXMED WILLOW AMBULATORY MEDICATION CHARGE

## 2025-04-24 RX ORDER — ATOMOXETINE 10 MG/1
CAPSULE ORAL
Qty: 90 CAPSULE | Refills: 0 | Status: SHIPPED | OUTPATIENT
Start: 2025-04-24

## 2025-04-25 ENCOUNTER — PHARMACY VISIT (OUTPATIENT)
Dept: PHARMACY | Facility: CLINIC | Age: 60
End: 2025-04-25
Payer: MEDICARE

## 2025-04-30 ENCOUNTER — APPOINTMENT (OUTPATIENT)
Dept: DERMATOLOGY | Facility: CLINIC | Age: 60
End: 2025-04-30
Payer: COMMERCIAL

## 2025-04-30 DIAGNOSIS — C43.59 MELANOMA OF FLANK (MULTI): Primary | ICD-10-CM

## 2025-04-30 PROCEDURE — 11602 EXC TR-EXT MAL+MARG 1.1-2 CM: CPT | Performed by: DERMATOLOGY

## 2025-04-30 PROCEDURE — 12032 INTMD RPR S/A/T/EXT 2.6-7.5: CPT | Performed by: DERMATOLOGY

## 2025-04-30 PROCEDURE — 99214 OFFICE O/P EST MOD 30 MIN: CPT | Performed by: DERMATOLOGY

## 2025-04-30 NOTE — PROGRESS NOTES
Excision Operative Note    Date of Surgery:  4/30/2025  Surgeon:  Chika Rob MD  Office Location:  [address] [facility]  [address] [facility] [address]  [facility] [address]  Dept: [phone]  Dept Fax: [phone]  Referring Provider: Елена Castillo MD  [id number] [address]  Department of Dermatology  [address]   Franck Mckinley is a 59 y.o. male who presents for the following: Excision ( Right Flank- AMH, MIS) for melanoma.    According to the patient, the lesion has been present for approximately 1 month at the time of diagnosis.  The lesion is not causing symptoms.  The lesion has not been treated previously.    The patient does not have a pacemaker / defibrillator.  The patient does not have a heart valve / joint replacement.    The patient is not on blood thinners.   The patient does not have a history of hepatitis B or C.  The patient does not have a history of HIV.  The patient does not have a history of immunosuppression (e.g. organ transplantation, malignancy, medications)    Is it okay to leave a phone message with results? Y  The following portions of the chart were reviewed this encounter and updated as appropriate:         Assessment/Plan   Pre-procedure:   Obtained informed consent: written from patient  The surgical site was identified and confirmed with the patient.     Intra-operative:   Audible time out called at : 9:15AM 04/30/25  by: Chika Rob MD   Verified patient name, birthdate, site, specimen bottle label & requisition.    The planned procedure(s) was again reviewed with the patient. The risks of bleeding, infection, nerve damage and scarring were reviewed. The patient identity, surgical site, and planned procedure(s) were verified.     Biopsy Accession Number: [id number]  MELANOMA OF FLANK (MULTI)  Right Flank  Skin excision    Lesion length (cm):  1  Lesion width (cm):  0.8  Margin per side (cm):  0.5  Total excision diameter (cm):   2  Informed consent: discussed and consent obtained    Timeout: patient name, date of birth, surgical site, and procedure verified    Procedure prep:  Patient was prepped and draped  Anesthesia: the lesion was anesthetized in a standard fashion    Local anesthetic: lidocaine 1.5% WITH epi.  Instrument used: #15 blade    Hemostasis achieved with: electrodesiccation    Outcome: patient tolerated procedure well with no complications    Post-procedure details: sterile dressing applied and wound care instructions given    Dressing type: pressure dressing, Telfa pad and Hypafix    Additional details:  Melanoma Esvin:   Curative Intent: Yes  Original Breslow Thickness: MIS  Clinical margin width: 0.5 cm  Depth of excision: Full thickness     Skin repair  Complexity:  Intermediate  Final length (cm):  4.8  Informed consent: discussed and consent obtained    Timeout: patient name, date of birth, surgical site, and procedure verified    Procedure prep:  Patient prepped in sterile fashion  Anesthesia: the lesion was anesthetized in a standard fashion    Local anesthetic: lidocaine 1.5% with epi.  Reason for type of repair: reduce tension to allow closure    Undermining: edges undermined    Subcutaneous layers (deep stitches):   Suture size:  3-0  Suture type: Vicryl (polyglactin 910)    Stitches:  Buried vertical mattress  Fine/surface layer approximation (top stitches):   Suture size:  5-0  Suture type: fast-absorbing plain gut    Stitches: simple running    Hemostasis achieved with: electrodesiccation  Outcome: patient tolerated procedure well with no complications    Post-procedure details: sterile dressing applied and wound care instructions given    Dressing type: pressure dressing    Specimen 1 - Dermatopathology- DERM LAB  Differential Diagnosis: MIS  Check Margins Yes  Comments:    Dermpath Lab: Routine Histopathology (formalin-fixed tissue)  Intermediate Linear Repair:  Given the location and size of the defect, it was  determined that an intermediate layered linear closure was required to restore normal anatomy and function. The repair is an intermediate closure as two layers of sutures were required. The defect was undermined extensively at the level of the subcutaneous plane. Standing cutaneous cones were removed using Burow's triangles. The wound edges were brought into close approximation with buried vertical mattress sutures. The remainder of the wound was then closed with epidermal top sutures.    The final repair measured 4.8 cm            Wound care was discussed, and the patient was given written post-operative wound care instructions.      The patient will follow up with Chika Rob MD as needed for any post operative problems or concerns, and will follow up with their primary dermatologist as scheduled.       Katelynn PATEL RN am scribing for, and in the presence of MD JORGE Prince Christina Y Wong, MD, personally performed the services described in the documentation as scribed by Katelynn Dubose RN in my presence, and confirm it is both accurate and complete.

## 2025-05-02 LAB
LABORATORY COMMENT REPORT: NORMAL
PATH REPORT.FINAL DX SPEC: NORMAL
PATH REPORT.GROSS SPEC: NORMAL
PATH REPORT.MICROSCOPIC SPEC OTHER STN: NORMAL
PATH REPORT.RELEVANT HX SPEC: NORMAL
PATH REPORT.TOTAL CANCER: NORMAL

## 2025-05-05 ENCOUNTER — TELEPHONE (OUTPATIENT)
Dept: DERMATOLOGY | Facility: CLINIC | Age: 60
End: 2025-05-05
Payer: COMMERCIAL

## 2025-05-05 ENCOUNTER — PATIENT MESSAGE (OUTPATIENT)
Dept: ENDOCRINOLOGY | Facility: CLINIC | Age: 60
End: 2025-05-05
Payer: COMMERCIAL

## 2025-05-05 DIAGNOSIS — E11.65 TYPE 2 DIABETES MELLITUS WITH HYPERGLYCEMIA, WITH LONG-TERM CURRENT USE OF INSULIN: ICD-10-CM

## 2025-05-05 DIAGNOSIS — Z79.4 TYPE 2 DIABETES MELLITUS WITH HYPERGLYCEMIA, WITH LONG-TERM CURRENT USE OF INSULIN: ICD-10-CM

## 2025-05-05 PROCEDURE — RXMED WILLOW AMBULATORY MEDICATION CHARGE

## 2025-05-05 RX ORDER — INSULIN GLARGINE 300 [IU]/ML
INJECTION, SOLUTION SUBCUTANEOUS
Qty: 18 ML | Refills: 3 | Status: SHIPPED | OUTPATIENT
Start: 2025-05-05

## 2025-05-05 RX ORDER — INSULIN GLARGINE 300 [IU]/ML
INJECTION, SOLUTION SUBCUTANEOUS
Qty: 6 ML | Refills: 8 | Status: CANCELLED | OUTPATIENT
Start: 2025-05-05

## 2025-05-05 NOTE — TELEPHONE ENCOUNTER
Surgical site: Right Flank  Date of procedure 4/30/2025    A voicemail was left for the patient about the pathology result which showed clear margins. No further surgery needed but the patient was advised to follow up with general dermatology. The patient was advised to call with any questions.

## 2025-05-06 ENCOUNTER — PHARMACY VISIT (OUTPATIENT)
Dept: PHARMACY | Facility: CLINIC | Age: 60
End: 2025-05-06
Payer: MEDICARE

## 2025-05-06 ENCOUNTER — APPOINTMENT (OUTPATIENT)
Dept: PRIMARY CARE | Facility: CLINIC | Age: 60
End: 2025-05-06
Payer: COMMERCIAL

## 2025-05-06 VITALS
BODY MASS INDEX: 36.45 KG/M2 | WEIGHT: 315 LBS | DIASTOLIC BLOOD PRESSURE: 82 MMHG | HEIGHT: 78 IN | SYSTOLIC BLOOD PRESSURE: 142 MMHG

## 2025-05-06 DIAGNOSIS — Z13.228 SCREENING FOR ENDOCRINE/METABOLIC/IMMUNITY DISORDERS: ICD-10-CM

## 2025-05-06 DIAGNOSIS — Z13.0 SCREENING FOR ENDOCRINE/METABOLIC/IMMUNITY DISORDERS: ICD-10-CM

## 2025-05-06 DIAGNOSIS — Z13.29 SCREENING FOR ENDOCRINE, METABOLIC AND IMMUNITY DISORDER: ICD-10-CM

## 2025-05-06 DIAGNOSIS — Z13.228 SCREENING FOR ENDOCRINE, METABOLIC AND IMMUNITY DISORDER: ICD-10-CM

## 2025-05-06 DIAGNOSIS — I10 BENIGN ESSENTIAL HYPERTENSION: ICD-10-CM

## 2025-05-06 DIAGNOSIS — Z13.29 SCREENING FOR ENDOCRINE/METABOLIC/IMMUNITY DISORDERS: ICD-10-CM

## 2025-05-06 DIAGNOSIS — Z20.9 EXPOSURE TO COMMUNICABLE DISEASE: ICD-10-CM

## 2025-05-06 DIAGNOSIS — G47.33 OSA (OBSTRUCTIVE SLEEP APNEA): ICD-10-CM

## 2025-05-06 DIAGNOSIS — D51.0 ANEMIA, PERNICIOUS: ICD-10-CM

## 2025-05-06 DIAGNOSIS — Z13.0 SCREENING FOR ENDOCRINE, METABOLIC AND IMMUNITY DISORDER: ICD-10-CM

## 2025-05-06 DIAGNOSIS — D22.30 MELANOCYTIC NEVI OF UNSPECIFIED PART OF FACE: ICD-10-CM

## 2025-05-06 DIAGNOSIS — E78.00 PURE HYPERCHOLESTEROLEMIA: ICD-10-CM

## 2025-05-06 DIAGNOSIS — Z11.59 SCREENING FOR MEASLES: ICD-10-CM

## 2025-05-06 DIAGNOSIS — Z00.00 ROUTINE GENERAL MEDICAL EXAMINATION AT A HEALTH CARE FACILITY: Primary | ICD-10-CM

## 2025-05-06 DIAGNOSIS — Z13.6 SCREENING FOR CARDIOVASCULAR CONDITION: ICD-10-CM

## 2025-05-06 PROCEDURE — 99396 PREV VISIT EST AGE 40-64: CPT | Performed by: INTERNAL MEDICINE

## 2025-05-06 PROCEDURE — 4010F ACE/ARB THERAPY RXD/TAKEN: CPT | Performed by: INTERNAL MEDICINE

## 2025-05-06 PROCEDURE — 3008F BODY MASS INDEX DOCD: CPT | Performed by: INTERNAL MEDICINE

## 2025-05-06 PROCEDURE — 99213 OFFICE O/P EST LOW 20 MIN: CPT | Performed by: INTERNAL MEDICINE

## 2025-05-06 PROCEDURE — 1036F TOBACCO NON-USER: CPT | Performed by: INTERNAL MEDICINE

## 2025-05-06 PROCEDURE — 3079F DIAST BP 80-89 MM HG: CPT | Performed by: INTERNAL MEDICINE

## 2025-05-06 PROCEDURE — 3060F POS MICROALBUMINURIA REV: CPT | Performed by: INTERNAL MEDICINE

## 2025-05-06 PROCEDURE — 3077F SYST BP >= 140 MM HG: CPT | Performed by: INTERNAL MEDICINE

## 2025-05-06 PROCEDURE — 3051F HG A1C>EQUAL 7.0%<8.0%: CPT | Performed by: INTERNAL MEDICINE

## 2025-05-06 PROCEDURE — 3048F LDL-C <100 MG/DL: CPT | Performed by: INTERNAL MEDICINE

## 2025-05-06 ASSESSMENT — PATIENT HEALTH QUESTIONNAIRE - PHQ9: 1. LITTLE INTEREST OR PLEASURE IN DOING THINGS: NOT AT ALL

## 2025-05-06 NOTE — PROGRESS NOTES
Subjective   Patient ID: Franck Mckinley is a 59 y.o. male who presents for Annual Exam.    HPI  Patient in for a visit  Will donate blood this afternoon 7 minutes one bag   Level 0 melanoma had Moh's , margins clear   Psa utd seeing urology   Patient comes in for a physical exam last one done over a year ago , doing well over-all with no particular complaints. Also is in for laboratory review and health maintenance update.  Updating family history as well.  Interval event - past medical history, surgical, social, and family history reviewed and updated.  Interval care -  Patient is    up to date with dental care.  Patient does     receive routine vision care.  Exercise not since right foot , wound care 90% right now seeing specialist     Review of Systems  General: Denies fever, chills, night sweats, changes in appetite or weight  ENT: Negative for ear pain, hearing loss, headache, difficulty swallowing, up to date with dental checks   Eyes: Negative for recent visual changes, up to date with eye exams  Dermatologic: Negative for new skin conditions, rash  Respiratory: Negative for paroxysmal nocturnal dyspnea, wheezing,shortness of breath, cough  Cardiovascular: Negative for chest pain, palpitations, or leg swelling  Gastrointestinal: Negative for nausea/vomiting, abdominal pain, changes in bowel habits  Genitourinary: Negative for dysuria, urgency, frequency  URINARY INCONTINENCE   Neurological: Negative for headaches, tremors, dizziness, memory loss, confusion, weakness, paresthesias  Psychiatric: Negative for sleep problems, anxiety, depression, conditions are stable  Endocrine: Negative for heat or cold intolerance, polyuria, polydipsia  Other:All systems have been reviewed and are negative except as previously noted.    Previous history  Medical History[1]  Surgical History[2]  Social History[3]  Family History[4]  Allergies[5]  Current Outpatient Medications   Medication Instructions    acetaminophen  "(Tylenol) 325 mg tablet 2 tablets, Every 4 hours PRN    amLODIPine (NORVASC) 10 mg, oral, Daily    atomoxetine (Strattera) 10 mg capsule TAKE 1 CAPSULE ONCE DAILY. SWALLOW WHOLE; DO NOT OPEN. IF OPENED ACCIDENTALLY, DO NOT TOUCH EYES; WASH HANDS IMMEDIATELY (PRODUCT IS AN EYE IRRITANT)    cholecalciferol (Vitamin D-3) 25 MCG (1000 UT) tablet 1 tablet, Daily    cyanocobalamin (Vitamin B-12) 1,000 mcg/mL injection 2 mL    ergocalciferol (VITAMIN D-2) 1,250 mcg, oral, Once Weekly    fenofibrate (TRICOR) 48 mg, oral, Daily, as directed    insulin aspart (NovoLOG Flexpen U-100 Insulin) 100 unit/mL (3 mL) pen Inject 12 units before each meal + sliding scale on Mon-Thur; Inject 20 units before each meal plus sliding scale on Fri-sun. Maximum daily dose 80 units    insulin glargine (Toujeo SoloStar U-300 Insulin) 300 unit/mL (1.5 mL) pen INJECT 56 UNITS SUBCUTANEOUSLY AT BEDTIME    Jardiance 25 mg, oral, Daily    losartan (COZAAR) 100 mg, oral, Daily    Mounjaro 15 mg, subcutaneous, Once Weekly    pen needle, diabetic (BD Ultra-Fine Mini Pen Needle) 31 gauge x 3/16\" needle USE TO INJECT INSULIN 4    TIMES A DAY    pravastatin (PRAVACHOL) 20 mg, oral, Nightly    sildenafil (VIAGRA) 100 mg, oral, As needed    sodium,potassium,mag sulfates (Suprep) 17.5-3.13-1.6 gram solution Please refer to the printed instructions that were mailed to you.    testosterone 20.25 mg/1.25 gram (1.62 %) gel in metered-dose pump Place 4 Pump (2 pumps to each shoulder) on the skin once daily.    testosterone 20.25 mg/1.25 gram (1.62 %) gel in metered-dose pump 4 Pump, transdermal, Daily, 2 pumps to each shoulder       Objective       Physical Exam  Vital Signs: as recorded above  General: Well groomed, well nourished   Orientation:  Alert , oriented to time, place , and person   Mood and Affect:  Cooperative , no apparent distress normal affect  Skin: Good color, good turgor  Eyes: Extra ocular muscle movements intact, anicteric sclerae  Ears:  " Auditory canals clear , TMS intact   Neck: Supple, full range of movement  Chest: Normal breath sounds, normal chest wall exam, symmetric, good air entry, clear to auscultation  Heart: Regular rate and rhythm, without murmur, gallop, or rubs  Abdomen soft nontender no masses felt no hepatosplenomegaly, no rebound or guarding  BACK:  no CTLS spine tenderness, no flank tenderness  Extremities: full range of movement  bilateral UE and bilateral LE,  no lower extremity edema  Neurological: Alert, oriented, cranial nerves II-XII intact except for visual acuity  Sensation:  Intact   Gait: normal steady      Assessment/Plan   Franck Mckinley is a 59 y.o. male who presents for the concerns below:    Problem List Items Addressed This Visit    None    DIABETES MELLITUS PLAN:Stable with current medication regimen  Follow Diabetic diet, will consult nutrition if needed,  exercise as discussed, weight control., before our next visit will obtain HbA1c, BMP, urine microalbumin, ophthalmology exam.  Need Podiatry checks periodically.  ATTENTION DEFICIT DISORDER PLAN: Effective at current dose strattera  . No side effects, , sleep, appetite, and weight stable. Medication will be continued used only for the prescribed medical condition  PERNICIOUS ANEMIA B12 injection, effective energy level improved , no injection site side effects , returning next week for this   Testosterone deficiency seeing Dr Rosales periodically   Obesity Continue to lose weight, inc activity exercise     ASSESSMENT AND PLAN: Patient on examination is in good health , concerns above, screening blood tests to screen for high cholesterol, diabetes, thyroid, Prostate Surface Antigen (PSA) for age 50 and above sooner if with family history of prostate cancer or with symptoms.   Preventive Medicine: colon cancer screening by age 45 if no family history, balanced diet, and exercise as discussed. Seat belt use for injury prevention  Substance use and /or  tobacco use not applicable / counseled when applicable. Immunizations TD  up to date.  Yearly flu vaccine unless contraindicated .Patient should be taking enough calcium in a balanced die More than 50% of office visit time spent counseling the patient, questions were answered. Complete physical examination in a year  Patient knows either has access to [facility] to see results and messages regarding these or will call us if cannot see them         Discussed with:   Return in :    Portions of this note were generated using digital voice recognition software, and may contain grammatical errors       Piper Alvarado MD  05/06/25  12:20 PM       [1]   Past Medical History:  Diagnosis Date    Abnormal finding of blood chemistry, unspecified 06/21/2017    Abnormal blood chemistry    Acute pharyngitis, unspecified 11/11/2019    Pharyngitis    Acute pharyngitis, unspecified 08/18/2020    Sore throat    Anosmia 04/19/2021    Anosmia    Calculus of kidney 01/04/2021    Kidney stone on right side    Cellulitis, unspecified 12/21/2022    Cellulitis    Chronic sinusitis, unspecified 04/26/2018    Sinusitis    Cough, unspecified 04/19/2021    Cough    Disorder of kidney and ureter, unspecified 11/14/2022    Renal insufficiency    Dorsalgia, unspecified 12/22/2020    Back pain    Edema, unspecified 11/16/2022    Edema    Encounter for screening for malignant neoplasm of colon 02/11/2020    Colon cancer screening    Encounter for screening for malignant neoplasm of prostate 01/19/2022    Screening for prostate cancer    Encounter for screening for malignant neoplasm of prostate 05/19/2020    Encounter for prostate cancer screening    Essential (primary) hypertension 11/16/2022    Benign essential hypertension    Fever, unspecified 04/19/2021    Fever    Follicular cyst of the skin and subcutaneous tissue, unspecified 11/11/2019    Infected cyst of skin    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    Low HDL (under 40)     Liver disease, unspecified 01/27/2021    Liver lesion    Male erectile dysfunction, unspecified 02/07/2020    Erectile dysfunction    Myalgia, unspecified site 08/18/2020    Myalgia    Other chest pain 01/19/2022    Chest discomfort    Other conditions influencing health status 01/19/2022    Skin tear    Other fatigue 03/21/2019    Fatigue    Other malaise 08/18/2020    Malaise and fatigue    Other nonspecific abnormal finding of lung field 02/10/2021    Abnormal CT scan, lung    Pain in right knee 06/26/2020    Right knee pain    Pain in right shoulder 03/30/2022    Right shoulder pain    Proteinuria, unspecified 11/14/2022    Proteinuria    Proteinuria, unspecified 11/13/2015    Proteinuria    Type 2 diabetes mellitus without complications 12/22/2022    Diabetes mellitus    Unspecified abdominal pain 05/26/2022    Abdominal pain    Unspecified open wound of unspecified toe(s) without damage to nail, initial encounter 03/16/2020    Wound, open, toe   [2]   Past Surgical History:  Procedure Laterality Date    AMPUTATION Left     left 2nd toe    HAND SURGERY  04/04/2014    Hand Surgery                                                                                                                                                          KNEE ARTHROPLASTY  04/04/2014    Knee Arthroplasty    OTHER SURGICAL HISTORY  06/17/2020    Colonoscopy   [3]   Social History  Tobacco Use    Smoking status: Former     Types: Cigarettes    Smokeless tobacco: Never   Vaping Use    Vaping status: Never Used   Substance Use Topics    Alcohol use: Not Currently    Drug use: Never   [4]   Family History  Problem Relation Name Age of Onset    Kidney cancer Mother      No Known Problems Daughter      No Known Problems Daughter     [5]   Allergies  Allergen Reactions    Chocolate Unknown

## 2025-05-08 ENCOUNTER — APPOINTMENT (OUTPATIENT)
Dept: PRIMARY CARE | Facility: CLINIC | Age: 60
End: 2025-05-08
Payer: COMMERCIAL

## 2025-05-08 DIAGNOSIS — E53.8 B12 DEFICIENCY: ICD-10-CM

## 2025-05-08 PROCEDURE — 96372 THER/PROPH/DIAG INJ SC/IM: CPT | Performed by: INTERNAL MEDICINE

## 2025-05-08 RX ORDER — CYANOCOBALAMIN 1000 UG/ML
1000 INJECTION, SOLUTION INTRAMUSCULAR; SUBCUTANEOUS ONCE
Status: COMPLETED | OUTPATIENT
Start: 2025-05-08 | End: 2025-05-08

## 2025-05-08 RX ADMIN — CYANOCOBALAMIN 1000 MCG: 1000 INJECTION, SOLUTION INTRAMUSCULAR; SUBCUTANEOUS at 08:14

## 2025-05-09 ENCOUNTER — APPOINTMENT (OUTPATIENT)
Dept: PRIMARY CARE | Facility: CLINIC | Age: 60
End: 2025-05-09
Payer: COMMERCIAL

## 2025-05-10 LAB
ESTRADIOL SERPL-MCNC: 49 PG/ML
HCT VFR BLD AUTO: 52.7 % (ref 38.5–50)
LH SERPL-ACNC: 0.3 MIU/ML (ref 1.5–9.3)
PSA SERPL-MCNC: 0.75 NG/ML
TESTOST SERPL-MCNC: 360 NG/DL (ref 250–827)

## 2025-05-11 LAB
ALBUMIN SERPL-MCNC: 5 G/DL (ref 3.6–5.1)
ALP SERPL-CCNC: 56 U/L (ref 35–144)
ALT SERPL-CCNC: 25 U/L (ref 9–46)
ANION GAP SERPL CALCULATED.4IONS-SCNC: 12 MMOL/L (CALC) (ref 7–17)
AST SERPL-CCNC: 17 U/L (ref 10–35)
BILIRUB SERPL-MCNC: 1 MG/DL (ref 0.2–1.2)
BUN SERPL-MCNC: 24 MG/DL (ref 7–25)
CALCIUM SERPL-MCNC: 9.4 MG/DL (ref 8.6–10.3)
CHLORIDE SERPL-SCNC: 106 MMOL/L (ref 98–110)
CHOLEST SERPL-MCNC: 131 MG/DL
CHOLEST/HDLC SERPL: 3 (CALC)
CO2 SERPL-SCNC: 25 MMOL/L (ref 20–32)
CREAT SERPL-MCNC: 1.18 MG/DL (ref 0.7–1.3)
EGFRCR SERPLBLD CKD-EPI 2021: 71 ML/MIN/1.73M2
ERYTHROCYTE [DISTWIDTH] IN BLOOD BY AUTOMATED COUNT: 15 % (ref 11–15)
GLUCOSE SERPL-MCNC: 106 MG/DL (ref 65–99)
HCT VFR BLD AUTO: 53.3 % (ref 38.5–50)
HDLC SERPL-MCNC: 43 MG/DL
HGB BLD-MCNC: 17.2 G/DL (ref 13.2–17.1)
LDLC SERPL CALC-MCNC: 64 MG/DL (CALC)
MCH RBC QN AUTO: 26.9 PG (ref 27–33)
MCHC RBC AUTO-ENTMCNC: 32.3 G/DL (ref 32–36)
MCV RBC AUTO: 83.3 FL (ref 80–100)
MEV IGG SER IA-ACNC: 20.7 AU/ML
NONHDLC SERPL-MCNC: 88 MG/DL (CALC)
PLATELET # BLD AUTO: 182 THOUSAND/UL (ref 140–400)
PMV BLD REES-ECKER: 10.3 FL (ref 7.5–12.5)
POTASSIUM SERPL-SCNC: 3.8 MMOL/L (ref 3.5–5.3)
PROT SERPL-MCNC: 7.7 G/DL (ref 6.1–8.1)
RBC # BLD AUTO: 6.4 MILLION/UL (ref 4.2–5.8)
SODIUM SERPL-SCNC: 143 MMOL/L (ref 135–146)
TRIGL SERPL-MCNC: 153 MG/DL
TSH SERPL-ACNC: 2.66 MIU/L (ref 0.4–4.5)
WBC # BLD AUTO: 8.5 THOUSAND/UL (ref 3.8–10.8)

## 2025-05-11 NOTE — PROGRESS NOTES
"Last visit 1/13/25  #Hypogonadism   -Continue androgel  2 pumps to each shoulder daily.   #Erectile Dysfunction  -continue Sildenafil 100mg prn, med refilled  #polycythemia       -donate blood   Fuv with Mirieme in 3 months with low T fu labs    Today's visit:    #Hypogonadism   -went to 1 pump on his own because hematocrit was elevated on recent lab  -held T for polycythemia (was on .4cc weekly)  feels better, but not 100%- feels there is a plateau     Wears CPAP, losing weight     #polycythemia  -has been donating blood every 6 weeks   -now improved     -Interested in fertility preservation: No.      HISTORY:   -Previous gynecomastia: none   -Personal or family history of prostate cancer: none   -Previous use of testosterone or anabolic steroids: none  -Mother had kidney cancer, grandmother had diabetes.   -No history of heart attack or stroke.      #Erectile Dysfunction  -Sildenafil 100mg -->works well         in the 1990's, now works as  at [employer].       Labs  Component      Latest Ref Rng 11/08/2024   LH      IU/L 0.2   Estradiol      pg/mL 30   PSA      <=4.00 ng/mL 0.88   Testosterone      240 - 1,000 ng/dL 203   HEMATOCRIT      41.0 - 52.0 % 51.1 (H)       Lab Results   Component Value Date    TESTOSTERONE 360 05/09/2025    TESTOSTERONE 264 03/31/2025    TESTOSTERONE 488 01/10/2025     Lab Results   Component Value Date    LH 0.3 (L) 05/09/2025     Lab Results   Component Value Date    FSH 7.1 03/17/2023     No components found for: \"ESTRADIAL\"  Lab Results   Component Value Date    PSA 0.75 05/09/2025     No components found for: \"CBC\"  Lab Results   Component Value Date    PROLACTIN 4.8 03/17/2023     Lab Results   Component Value Date    HGBA1C 7.6 (H) 01/10/2025     Lab Results   Component Value Date    HCT 53.3 (H) 05/09/2025     Lab Results   Component Value Date    ESTRADIOL 49 (H) 05/09/2025           Medications:    Current Outpatient Medications:     " "acetaminophen (Tylenol) 325 mg tablet, Take 2 tablets (650 mg) by mouth every 4 hours if needed (pain)., Disp: , Rfl:     amLODIPine (Norvasc) 10 mg tablet, TAKE 1 TABLET DAILY, Disp: 90 tablet, Rfl: 1    atomoxetine (Strattera) 10 mg capsule, TAKE 1 CAPSULE ONCE DAILY. SWALLOW WHOLE; DO NOT OPEN. IF OPENED ACCIDENTALLY, DO NOT TOUCH EYES; WASH HANDS IMMEDIATELY (PRODUCT IS AN EYE IRRITANT), Disp: 90 capsule, Rfl: 0    cholecalciferol (Vitamin D-3) 25 MCG (1000 UT) tablet, Take 1 tablet (25 mcg) by mouth once daily., Disp: , Rfl:     cyanocobalamin (Vitamin B-12) 1,000 mcg/mL injection, Inject 2 mL (2,000 mcg) into the muscle. per AQ., Disp: , Rfl:     ergocalciferol (Vitamin D-2) 1250 mcg (50,000 units) capsule, TAKE 1 CAPSULE BY MOUTH ONE TIME PER WEEK, Disp: 4 capsule, Rfl: 5    fenofibrate (Tricor) 48 mg tablet, Take 1 tablet (48 mg) by mouth once daily. as directed, Disp: 90 tablet, Rfl: 3    insulin aspart (NovoLOG Flexpen U-100 Insulin) 100 unit/mL (3 mL) pen, Inject 12 units before each meal + sliding scale on Mon-Thur; Inject 20 units before each meal plus sliding scale on Fri-sun. Maximum daily dose 80 units, Disp: 75 mL, Rfl: 3    insulin glargine (Toujeo SoloStar U-300 Insulin) 300 unit/mL (1.5 mL) pen, INJECT 56 UNITS SUBCUTANEOUSLY AT BEDTIME, Disp: 18 mL, Rfl: 3    Jardiance 25 mg, Take 1 tablet (25 mg) by mouth once daily., Disp: 90 tablet, Rfl: 3    losartan (Cozaar) 100 mg tablet, Take 1 tablet (100 mg) by mouth once daily., Disp: 90 tablet, Rfl: 3    pen needle, diabetic (BD Ultra-Fine Mini Pen Needle) 31 gauge x 3/16\" needle, USE TO INJECT INSULIN 4    TIMES A DAY, Disp: 400 each, Rfl: 1    pravastatin (Pravachol) 20 mg tablet, Take 1 tablet (20 mg) by mouth once daily at bedtime., Disp: 90 tablet, Rfl: 3    sildenafil (Viagra) 100 mg tablet, Take 1 tablet (100 mg) by mouth if needed for erectile dysfunction (Start with half tab. Take 1 hour prior to intercourse on an empty stomach. If you have " an erection for over 4 hours, please go to the emergency room.)., Disp: 30 tablet, Rfl: 6    sodium,potassium,mag sulfates (Suprep) 17.5-3.13-1.6 gram solution, Please refer to the printed instructions that were mailed to you., Disp: 360 mL, Rfl: 0    testosterone 20.25 mg/1.25 gram (1.62 %) gel in metered-dose pump, Place 4 Pump (2 pumps to each shoulder) on the skin once daily., Disp: 150 g, Rfl: 2    testosterone 20.25 mg/1.25 gram (1.62 %) gel in metered-dose pump, Place 4 Pump on the skin once daily. 2 pumps to each shoulder, Disp: 150 g, Rfl: 1    tirzepatide (Mounjaro) 15 mg/0.5 mL pen injector, Inject 15 mg under the skin 1 (one) time per week., Disp: 6 mL, Rfl: 1    Current Facility-Administered Medications:     cyanocobalamin (Vitamin B-12) injection 1,000 mcg, 1,000 mcg, intramuscular, Once, Piper Alvarado MD    Allergy:  Allergies   Allergen Reactions    Chocolate Unknown        Exam  CONSTITUTIONAL:        No acute distress    HEAD:        Normocephalic and atraumatic    CHEST / RESPIRATORY      no excess work of breathing, no respiratory distress,    ABDOMEN / GASTROINTESTINAL:        Abdomen nondistended          Assessment/Plan  #Hypogonadism   -decrease  androgel  1 pump to each shoulder daily, refilled today      #Erectile Dysfunction  -continue Sildenafil 100mg prn     #polycythemia       -donate blood      #Screening for hyperestrogenemia   -E2   -arimidex if E is elevated      #Prostate cancer screening   -PSA.      #Fertility preservation   -Not interested, done having children.      Fuv in 3 months with low T fu labs     I have personally reviewed the [facility] report for this patient I have considered the risks of abuse, dependence, addiction and diversion. I believe that it is clinically appropriate for him to be prescribed this medication.”      G [id number]  Visit complexity inherent to evaluation and management (E&M) associated with medical care services that serve as the continuing  focal point for all needed health care services and/or with medical care services that are part of ongoing care related to a patient's single, serious condition or a complex condition.      Scribe Attestation  By signing my name below, IKamille Scribe   attest that this documentation has been prepared under the direction and in the presence of Paris Rosales MD.

## 2025-05-12 ENCOUNTER — APPOINTMENT (OUTPATIENT)
Dept: UROLOGY | Facility: HOSPITAL | Age: 60
End: 2025-05-12
Payer: COMMERCIAL

## 2025-05-12 DIAGNOSIS — E29.1 HYPOGONADISM IN MALE: ICD-10-CM

## 2025-05-12 DIAGNOSIS — Z12.5 PROSTATE CANCER SCREENING: ICD-10-CM

## 2025-05-12 PROCEDURE — 99214 OFFICE O/P EST MOD 30 MIN: CPT | Performed by: UROLOGY

## 2025-05-12 PROCEDURE — 4010F ACE/ARB THERAPY RXD/TAKEN: CPT | Performed by: UROLOGY

## 2025-05-12 PROCEDURE — 3048F LDL-C <100 MG/DL: CPT | Performed by: UROLOGY

## 2025-05-12 PROCEDURE — 3051F HG A1C>EQUAL 7.0%<8.0%: CPT | Performed by: UROLOGY

## 2025-05-12 PROCEDURE — G2211 COMPLEX E/M VISIT ADD ON: HCPCS | Performed by: UROLOGY

## 2025-05-12 PROCEDURE — 3060F POS MICROALBUMINURIA REV: CPT | Performed by: UROLOGY

## 2025-05-12 RX ORDER — TESTOSTERONE 20.25 MG/1.25G
2 GEL TOPICAL DAILY
Qty: 150 G | Refills: 3 | Status: SHIPPED | OUTPATIENT
Start: 2025-05-12

## 2025-05-23 PROCEDURE — RXMED WILLOW AMBULATORY MEDICATION CHARGE

## 2025-05-28 ENCOUNTER — PHARMACY VISIT (OUTPATIENT)
Dept: PHARMACY | Facility: CLINIC | Age: 60
End: 2025-05-28
Payer: MEDICARE

## 2025-05-28 NOTE — PROGRESS NOTES
Follow up for diabetes. JUAN C with me 02/2025     History of Present Illness  59 y.o. male with hx of type 2 diabetes, obesity class II, HTN, erectile dysfunction.    Dx: 2015  HbA1c: 6.9% (06/2025), 7.6% (01/2025), 7.3% (9/20/2024), 7.5% (5/2/2024), 8.1% (1/16/2024), 8.3 (03/2023), 8.3% (12/2022)  Current regimen: Jardiance 25 mg QDAY, Toujeo 56 units QAM, Novolog 12 units BIDAC (Mon-Thur), 20 units BIDAC (Fri-Sun) + SS#2, Mounjaro 15 mg/week   Past medications: metformin, glipizide, Trulicity, Ozempic (blurry vision)  Complications: diabetic foot ulcer, nephropathy  Comorbidities: HTN, obesity    SMBG: Roverto 2    Hypoglycemia: occasional    Diet:  Breakfast (7:30 am)-Wasa crackers with peanut butter and jelly, roasted salmon and veggies, black coffee, hard boiled eggs, olives  Lunch-typically doesn't eat lunch  Dinner (6-8 pm)-salad, roasted chicken or pork, beef or fish, roasted or steamed vegetables, occasional rice or noodles  Snacks-popcorn, fruit (cantaloup)  Drinks: water with Crystal Light packets, black coffee, one can diet pepsi per week    Exercise: doing band exercises     Today:  -broke his toe his in November; still in a boot because he dislocated it afterwards. Wounds have healed.     ROS  General: no fever or chills  CV: no chest pain   Respiratory: no shortness of breath  MSK: no lower extremity edema  Neuro: no headache or dizziness  See HPI for Endocrine ROS    Past Medical History:   Diagnosis Date    Abnormal finding of blood chemistry, unspecified 06/21/2017    Abnormal blood chemistry    Acute pharyngitis, unspecified 11/11/2019    Pharyngitis    Acute pharyngitis, unspecified 08/18/2020    Sore throat    Anosmia 04/19/2021    Anosmia    Calculus of kidney 01/04/2021    Kidney stone on right side    Cellulitis, unspecified 12/21/2022    Cellulitis    Chronic sinusitis, unspecified 04/26/2018    Sinusitis    Cough, unspecified 04/19/2021    Cough    Disorder of kidney and ureter, unspecified  11/14/2022    Renal insufficiency    Dorsalgia, unspecified 12/22/2020    Back pain    Edema, unspecified 11/16/2022    Edema    Encounter for screening for malignant neoplasm of colon 02/11/2020    Colon cancer screening    Encounter for screening for malignant neoplasm of prostate 01/19/2022    Screening for prostate cancer    Encounter for screening for malignant neoplasm of prostate 05/19/2020    Encounter for prostate cancer screening    Essential (primary) hypertension 11/16/2022    Benign essential hypertension    Fever, unspecified 04/19/2021    Fever    Follicular cyst of the skin and subcutaneous tissue, unspecified 11/11/2019    Infected cyst of skin    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    Low HDL (under 40)    Liver disease, unspecified 01/27/2021    Liver lesion    Male erectile dysfunction, unspecified 02/07/2020    Erectile dysfunction    Myalgia, unspecified site 08/18/2020    Myalgia    Other chest pain 01/19/2022    Chest discomfort    Other conditions influencing health status 01/19/2022    Skin tear    Other fatigue 03/21/2019    Fatigue    Other malaise 08/18/2020    Malaise and fatigue    Other nonspecific abnormal finding of lung field 02/10/2021    Abnormal CT scan, lung    Pain in right knee 06/26/2020    Right knee pain    Pain in right shoulder 03/30/2022    Right shoulder pain    Proteinuria, unspecified 11/14/2022    Proteinuria    Proteinuria, unspecified 11/13/2015    Proteinuria    Type 2 diabetes mellitus without complications 12/22/2022    Diabetes mellitus    Unspecified abdominal pain 05/26/2022    Abdominal pain    Unspecified open wound of unspecified toe(s) without damage to nail, initial encounter 03/16/2020    Wound, open, toe       Past Surgical History:   Procedure Laterality Date    AMPUTATION Left     left 2nd toe    HAND SURGERY  04/04/2014    Hand Surgery                                                                                                                       "                                    KNEE ARTHROPLASTY  04/04/2014    Knee Arthroplasty    OTHER SURGICAL HISTORY  06/17/2020    Colonoscopy       Social History     Socioeconomic History    Marital status:      Spouse name: Not on file    Number of children: Not on file    Years of education: Not on file    Highest education level: Not on file   Occupational History    Not on file   Tobacco Use    Smoking status: Former     Types: Cigarettes    Smokeless tobacco: Never   Vaping Use    Vaping status: Never Used   Substance and Sexual Activity    Alcohol use: Not Currently    Drug use: Not Currently     Comment: for sleep    Sexual activity: Not on file   Other Topics Concern    Not on file   Social History Narrative    Not on file     Social Drivers of Health     Financial Resource Strain: Not on file   Food Insecurity: Not on file   Transportation Needs: Not on file   Physical Activity: Not on file   Stress: Not on file   Social Connections: Not on file   Intimate Partner Violence: Not on file   Housing Stability: Not on file       Physical Exam   height is 2.083 m (6' 10\") and weight is 158 kg (348 lb 3.2 oz) (abnormal). His temporal temperature is 36.1 °C (97 °F). His blood pressure is 131/82 and his pulse is 70.   General: not in acute distress  HEENT: JOVAN, EOMI  Thyroid: no goiter  Neuro: alert and oriented x 3    Current Outpatient Medications   Medication Sig Dispense Refill    acetaminophen (Tylenol) 325 mg tablet Take 2 tablets (650 mg) by mouth every 4 hours if needed (pain).      amLODIPine (Norvasc) 10 mg tablet TAKE 1 TABLET DAILY 90 tablet 1    atomoxetine (Strattera) 10 mg capsule TAKE 1 CAPSULE ONCE DAILY. SWALLOW WHOLE; DO NOT OPEN. IF OPENED ACCIDENTALLY, DO NOT TOUCH EYES; WASH HANDS IMMEDIATELY (PRODUCT IS AN EYE IRRITANT) 90 capsule 0    cholecalciferol (Vitamin D-3) 25 MCG (1000 UT) tablet Take 1 tablet (25 mcg) by mouth once daily.      cyanocobalamin (Vitamin B-12) 1,000 mcg/mL " "injection Inject 2 mL (2,000 mcg) into the muscle. per AQ.      ergocalciferol (Vitamin D-2) 1250 mcg (50,000 units) capsule TAKE 1 CAPSULE BY MOUTH ONE TIME PER WEEK 4 capsule 5    fenofibrate (Tricor) 48 mg tablet Take 1 tablet (48 mg) by mouth once daily. as directed 90 tablet 3    insulin aspart (NovoLOG Flexpen U-100 Insulin) 100 unit/mL (3 mL) pen Inject 12 units before each meal + sliding scale on Mon-Thur; Inject 20 units before each meal plus sliding scale on Fri-sun. Maximum daily dose 80 units 75 mL 3    losartan (Cozaar) 100 mg tablet Take 1 tablet (100 mg) by mouth once daily. 90 tablet 3    sodium,potassium,mag sulfates (Suprep) 17.5-3.13-1.6 gram solution Please refer to the printed instructions that were mailed to you. 360 mL 0    testosterone 20.25 mg/1.25 gram (1.62 %) gel in metered-dose pump Place 4 Pump (2 pumps to each shoulder) on the skin once daily. 150 g 2    testosterone 20.25 mg/1.25 gram (1.62 %) gel in metered-dose pump Place 4 Pump on the skin once daily. 2 pumps to each shoulder 150 g 1    testosterone 20.25 mg/1.25 gram (1.62 %) gel in metered-dose pump Place 2 pumps on the skin once daily. 1 pumps to each shoulder 150 g 3    insulin glargine (Toujeo SoloStar U-300 Insulin) 300 unit/mL (1.5 mL) pen INJECT 53 UNITS SUBCUTANEOUSLY AT BEDTIME 18 mL 3    Jardiance 25 mg tablet Take 1 tablet (25 mg) by mouth once daily. 90 tablet 3    pen needle, diabetic (BD Ultra-Fine Mini Pen Needle) 31 gauge x 3/16\" needle USE TO INJECT INSULIN 4    TIMES A DAY (Patient not taking: Reported on 6/10/2025) 400 each 1    pravastatin (Pravachol) 20 mg tablet Take 1 tablet (20 mg) by mouth once daily at bedtime. 90 tablet 3    sildenafil (Viagra) 100 mg tablet Take 1 tablet (100 mg) by mouth if needed for erectile dysfunction (Start with half tab. Take 1 hour prior to intercourse on an empty stomach. If you have an erection for over 4 hours, please go to the emergency room.). 30 tablet 6    tirzepatide " (Mounjaro) 15 mg/0.5 mL pen injector Inject 15 mg under the skin 1 (one) time per week. 6 mL 3     Current Facility-Administered Medications   Medication Dose Route Frequency Provider Last Rate Last Admin    cyanocobalamin (Vitamin B-12) injection 1,000 mcg  1,000 mcg intramuscular Once Piper Alvarado MD                   Assessment and Plan  59 y.o. male  with hx of type 2 diabetes, obesity class II, HTN, erectile dysfunction, here for management of diabetes.    1. Type 2 diabetes mellitus  HbA1c: 6.9% (06/2025), 7.6% (01/2025), 7.3% (9/20/2024), 7.5% (5/2/2024), 8.1% (12/2023), 8.3 (03/2023), 8.3% (12/2022)  Current regimen: Jardiance 25 mg QDAY, Toujeo 56 units QAM, Novolog 12 units BIDAC (Mon-Thur), 20 units BIDAC (Fri-Sun) + SS#2, Mounjaro 15 mg/week   Eye exam: no DR (11/2024) [facility] in [place]  Urine microalbumin: 411 ug/mg (01/2025) on SGLT2i since 03/2023  Podiatry: [facility], Dr. MATT Barksdale. Follows regularly.  Lipids: HDL 40, LDL 51,  (01/2025) on fenofibrate and pravastatin    A1c: 6.9% today!  Takes Mounjaro on Sundays. Appetite is suppressed from Mon-Thursday.  Appetite returns on Friday and he tends to eat more over the weekend (CGM readings support this).     Roverto download reviewed.  Significant improvement in post-prandial hyperglycemia.  Having occasional fasting hypoglycemia.  Will decrease Toujeo.    Has been taking Novolog even when BG<150 now as we discussed at last visit.  Has been taking 12 units + SS  Mon-Thur and 20 units +SS Fri-Sun.  This is working well.  Encouraged him to keep up the good work!    Taking Ahiflower fish oil (mix of omega-3 acids). TG is slightly lower than prior.    PLAN:  -continue Mounjaro 15 mg/week  -decrease Toujeo to 53 units QAM   -continue Novolog 12 units TIDAC + SS #2, Mon-Thur; 20 units TIDAC+SS#2, Fri-Sun  -continue Jardiance  -check blood sugars before every meal and bedtime  -prefers 5 mm pen  needles  -Roverto 3 through [place]    Follow-up in 4 months with Dr. Avitia ([facility])

## 2025-06-02 PROCEDURE — RXMED WILLOW AMBULATORY MEDICATION CHARGE

## 2025-06-04 ENCOUNTER — PHARMACY VISIT (OUTPATIENT)
Dept: PHARMACY | Facility: CLINIC | Age: 60
End: 2025-06-04
Payer: MEDICARE

## 2025-06-06 ENCOUNTER — TELEPHONE (OUTPATIENT)
Dept: ENDOCRINOLOGY | Facility: CLINIC | Age: 60
End: 2025-06-06
Payer: COMMERCIAL

## 2025-06-06 NOTE — TELEPHONE ENCOUNTER
LVM appt reminder/ please connect cgm to the office. If had eye exam please have the results faxed to the office. Office phone number provided.   PATIENT CALLS OFFICE BACK STATES HE HAD EYE EXAM IN DECEMBER AT [facility] WILL NEED THE REPORT SENT CALLED [facility] TO REQUEST RESULT OF EXAM.   Called [facility] x2 spoke with staff member, gave the fax number they will fax over the last eye exam.

## 2025-06-09 ENCOUNTER — APPOINTMENT (OUTPATIENT)
Dept: PRIMARY CARE | Facility: CLINIC | Age: 60
End: 2025-06-09
Payer: COMMERCIAL

## 2025-06-10 ENCOUNTER — OFFICE VISIT (OUTPATIENT)
Dept: PRIMARY CARE | Facility: CLINIC | Age: 60
End: 2025-06-10
Payer: COMMERCIAL

## 2025-06-10 ENCOUNTER — APPOINTMENT (OUTPATIENT)
Dept: WOUND CARE | Facility: CLINIC | Age: 60
End: 2025-06-10
Payer: COMMERCIAL

## 2025-06-10 ENCOUNTER — APPOINTMENT (OUTPATIENT)
Dept: ENDOCRINOLOGY | Facility: CLINIC | Age: 60
End: 2025-06-10
Payer: COMMERCIAL

## 2025-06-10 VITALS
DIASTOLIC BLOOD PRESSURE: 82 MMHG | HEIGHT: 78 IN | WEIGHT: 315 LBS | SYSTOLIC BLOOD PRESSURE: 131 MMHG | TEMPERATURE: 97 F | HEART RATE: 70 BPM | BODY MASS INDEX: 36.45 KG/M2

## 2025-06-10 DIAGNOSIS — E53.8 B12 DEFICIENCY: ICD-10-CM

## 2025-06-10 DIAGNOSIS — Z79.4 TYPE 2 DIABETES MELLITUS WITH HYPERGLYCEMIA, WITH LONG-TERM CURRENT USE OF INSULIN: ICD-10-CM

## 2025-06-10 DIAGNOSIS — E11.65 TYPE 2 DIABETES MELLITUS WITH HYPERGLYCEMIA, WITH LONG-TERM CURRENT USE OF INSULIN: ICD-10-CM

## 2025-06-10 LAB — POC HEMOGLOBIN A1C: 6.9 % (ref 4.2–6.5)

## 2025-06-10 PROCEDURE — 3060F POS MICROALBUMINURIA REV: CPT | Performed by: STUDENT IN AN ORGANIZED HEALTH CARE EDUCATION/TRAINING PROGRAM

## 2025-06-10 PROCEDURE — 99214 OFFICE O/P EST MOD 30 MIN: CPT | Performed by: STUDENT IN AN ORGANIZED HEALTH CARE EDUCATION/TRAINING PROGRAM

## 2025-06-10 PROCEDURE — 4010F ACE/ARB THERAPY RXD/TAKEN: CPT | Performed by: STUDENT IN AN ORGANIZED HEALTH CARE EDUCATION/TRAINING PROGRAM

## 2025-06-10 PROCEDURE — 3008F BODY MASS INDEX DOCD: CPT | Performed by: STUDENT IN AN ORGANIZED HEALTH CARE EDUCATION/TRAINING PROGRAM

## 2025-06-10 PROCEDURE — 3079F DIAST BP 80-89 MM HG: CPT | Performed by: STUDENT IN AN ORGANIZED HEALTH CARE EDUCATION/TRAINING PROGRAM

## 2025-06-10 PROCEDURE — 83036 HEMOGLOBIN GLYCOSYLATED A1C: CPT | Performed by: STUDENT IN AN ORGANIZED HEALTH CARE EDUCATION/TRAINING PROGRAM

## 2025-06-10 PROCEDURE — 3075F SYST BP GE 130 - 139MM HG: CPT | Performed by: STUDENT IN AN ORGANIZED HEALTH CARE EDUCATION/TRAINING PROGRAM

## 2025-06-10 PROCEDURE — 95251 CONT GLUC MNTR ANALYSIS I&R: CPT | Performed by: STUDENT IN AN ORGANIZED HEALTH CARE EDUCATION/TRAINING PROGRAM

## 2025-06-10 PROCEDURE — 3048F LDL-C <100 MG/DL: CPT | Performed by: STUDENT IN AN ORGANIZED HEALTH CARE EDUCATION/TRAINING PROGRAM

## 2025-06-10 PROCEDURE — 1036F TOBACCO NON-USER: CPT | Performed by: STUDENT IN AN ORGANIZED HEALTH CARE EDUCATION/TRAINING PROGRAM

## 2025-06-10 PROCEDURE — 3044F HG A1C LEVEL LT 7.0%: CPT | Performed by: STUDENT IN AN ORGANIZED HEALTH CARE EDUCATION/TRAINING PROGRAM

## 2025-06-10 RX ORDER — CYANOCOBALAMIN 1000 UG/ML
1000 INJECTION, SOLUTION INTRAMUSCULAR; SUBCUTANEOUS ONCE
Status: COMPLETED | OUTPATIENT
Start: 2025-06-10 | End: 2025-06-10

## 2025-06-10 RX ORDER — TIRZEPATIDE 15 MG/.5ML
15 INJECTION, SOLUTION SUBCUTANEOUS
Qty: 6 ML | Refills: 3 | Status: SHIPPED | OUTPATIENT
Start: 2025-06-10

## 2025-06-10 RX ORDER — INSULIN GLARGINE 300 [IU]/ML
INJECTION, SOLUTION SUBCUTANEOUS
Qty: 18 ML | Refills: 3 | Status: SHIPPED | OUTPATIENT
Start: 2025-06-10

## 2025-06-10 RX ORDER — EMPAGLIFLOZIN 25 MG/1
25 TABLET, FILM COATED ORAL DAILY
Qty: 90 TABLET | Refills: 3 | Status: SHIPPED | OUTPATIENT
Start: 2025-06-10

## 2025-06-10 RX ORDER — PRAVASTATIN SODIUM 20 MG/1
20 TABLET ORAL NIGHTLY
Qty: 90 TABLET | Refills: 3 | Status: SHIPPED | OUTPATIENT
Start: 2025-06-10

## 2025-06-10 RX ADMIN — CYANOCOBALAMIN 1000 MCG: 1000 INJECTION, SOLUTION INTRAMUSCULAR; SUBCUTANEOUS at 08:14

## 2025-06-10 RX ADMIN — CYANOCOBALAMIN 1000 MCG: 1000 INJECTION, SOLUTION INTRAMUSCULAR; SUBCUTANEOUS at 08:13

## 2025-06-10 ASSESSMENT — ENCOUNTER SYMPTOMS
DEPRESSION: 0
LOSS OF SENSATION IN FEET: 0
OCCASIONAL FEELINGS OF UNSTEADINESS: 0

## 2025-06-10 ASSESSMENT — PATIENT HEALTH QUESTIONNAIRE - PHQ9
SUM OF ALL RESPONSES TO PHQ9 QUESTIONS 1 AND 2: 0
1. LITTLE INTEREST OR PLEASURE IN DOING THINGS: NOT AT ALL
2. FEELING DOWN, DEPRESSED OR HOPELESS: NOT AT ALL

## 2025-06-10 ASSESSMENT — PAIN SCALES - GENERAL: PAINLEVEL_OUTOF10: 0-NO PAIN

## 2025-06-13 ENCOUNTER — TELEPHONE (OUTPATIENT)
Dept: CARE COORDINATION | Age: 60
End: 2025-06-13
Payer: COMMERCIAL

## 2025-06-19 ENCOUNTER — ANESTHESIA EVENT (OUTPATIENT)
Dept: GASTROENTEROLOGY | Facility: HOSPITAL | Age: 60
End: 2025-06-19
Payer: COMMERCIAL

## 2025-06-19 ENCOUNTER — HOSPITAL ENCOUNTER (OUTPATIENT)
Dept: GASTROENTEROLOGY | Facility: HOSPITAL | Age: 60
Discharge: HOME | End: 2025-06-19
Payer: COMMERCIAL

## 2025-06-19 ENCOUNTER — ANESTHESIA (OUTPATIENT)
Dept: GASTROENTEROLOGY | Facility: HOSPITAL | Age: 60
End: 2025-06-19
Payer: COMMERCIAL

## 2025-06-19 ENCOUNTER — PATIENT MESSAGE (OUTPATIENT)
Dept: GASTROENTEROLOGY | Facility: HOSPITAL | Age: 60
End: 2025-06-19

## 2025-06-19 VITALS
HEIGHT: 78 IN | OXYGEN SATURATION: 97 % | BODY MASS INDEX: 36.45 KG/M2 | DIASTOLIC BLOOD PRESSURE: 80 MMHG | RESPIRATION RATE: 14 BRPM | WEIGHT: 315 LBS | TEMPERATURE: 98.2 F | SYSTOLIC BLOOD PRESSURE: 138 MMHG | HEART RATE: 70 BPM

## 2025-06-19 DIAGNOSIS — Z12.11 SCREENING FOR COLON CANCER: ICD-10-CM

## 2025-06-19 LAB — GLUCOSE BLD MANUAL STRIP-MCNC: 102 MG/DL (ref 74–99)

## 2025-06-19 PROCEDURE — 45378 DIAGNOSTIC COLONOSCOPY: CPT | Performed by: COLON & RECTAL SURGERY

## 2025-06-19 PROCEDURE — 2500000004 HC RX 250 GENERAL PHARMACY W/ HCPCS (ALT 636 FOR OP/ED): Mod: JW | Performed by: ANESTHESIOLOGIST ASSISTANT

## 2025-06-19 PROCEDURE — 7100000009 HC PHASE TWO TIME - INITIAL BASE CHARGE

## 2025-06-19 PROCEDURE — 3700000001 HC GENERAL ANESTHESIA TIME - INITIAL BASE CHARGE

## 2025-06-19 PROCEDURE — 7100000010 HC PHASE TWO TIME - EACH INCREMENTAL 1 MINUTE

## 2025-06-19 PROCEDURE — 3700000002 HC GENERAL ANESTHESIA TIME - EACH INCREMENTAL 1 MINUTE

## 2025-06-19 PROCEDURE — 82947 ASSAY GLUCOSE BLOOD QUANT: CPT

## 2025-06-19 RX ORDER — MIDAZOLAM HYDROCHLORIDE 1 MG/ML
INJECTION INTRAMUSCULAR; INTRAVENOUS AS NEEDED
Status: DISCONTINUED | OUTPATIENT
Start: 2025-06-19 | End: 2025-06-19

## 2025-06-19 RX ORDER — PROPOFOL 10 MG/ML
INJECTION, EMULSION INTRAVENOUS AS NEEDED
Status: DISCONTINUED | OUTPATIENT
Start: 2025-06-19 | End: 2025-06-19

## 2025-06-19 RX ADMIN — PROPOFOL 100 MG: 10 INJECTION, EMULSION INTRAVENOUS at 08:57

## 2025-06-19 RX ADMIN — SODIUM CHLORIDE, SODIUM LACTATE, POTASSIUM CHLORIDE, AND CALCIUM CHLORIDE: .6; .31; .03; .02 INJECTION, SOLUTION INTRAVENOUS at 08:52

## 2025-06-19 RX ADMIN — MIDAZOLAM HYDROCHLORIDE 2 MG: 1 INJECTION, SOLUTION INTRAMUSCULAR; INTRAVENOUS at 08:50

## 2025-06-19 RX ADMIN — PROPOFOL 100 MCG/KG/MIN: 10 INJECTION, EMULSION INTRAVENOUS at 08:58

## 2025-06-19 ASSESSMENT — PAIN - FUNCTIONAL ASSESSMENT
PAIN_FUNCTIONAL_ASSESSMENT: 0-10

## 2025-06-19 ASSESSMENT — COLUMBIA-SUICIDE SEVERITY RATING SCALE - C-SSRS
1. IN THE PAST MONTH, HAVE YOU WISHED YOU WERE DEAD OR WISHED YOU COULD GO TO SLEEP AND NOT WAKE UP?: NO
2. HAVE YOU ACTUALLY HAD ANY THOUGHTS OF KILLING YOURSELF?: NO
6. HAVE YOU EVER DONE ANYTHING, STARTED TO DO ANYTHING, OR PREPARED TO DO ANYTHING TO END YOUR LIFE?: NO

## 2025-06-19 ASSESSMENT — PAIN SCALES - GENERAL
PAINLEVEL_OUTOF10: 0 - NO PAIN

## 2025-06-19 NOTE — DISCHARGE INSTRUCTIONS
Patient Instructions after a Colonoscopy      The anesthetics, sedatives or narcotics which were given to you today will be acting in your body for the next 24 hours, so you might feel a little sleepy or groggy.  This feeling should slowly wear off. Carefully read and follow the instructions.     You received sedation today:  - Do not drive or operate any machinery or power tools of any kind.   - No alcoholic beverages today, not even beer or wine.  - Do not make any important decisions or sign any legal documents.  - No over the counter medications that contain alcohol or that may cause drowsiness.  - Do not make any important decisions or sign any legal documents.  - Make sure you have someone with you for first 24 hours.    While it is common to experience mild to moderate abdominal distention, gas, or belching after your procedure, if any of these symptoms occur following discharge from the GI Lab or within one week of having your procedure, call the [facility] to be advised whether a visit to your nearest Urgent Care or Emergency Department is indicated.  Take this paper with you if you go.     - If you develop an allergic reaction to the medications that were given during your procedure such as difficulty breathing, rash, hives, severe nausea, vomiting or lightheadedness.  - If you experience chest pain, shortness of breath, severe abdominal pain, fevers and chills.  -If you develop signs and symptoms of bleeding such as blood in your spit, if your stools turn black, tarry, or bloody  - If you have not urinated within 8 hours following your procedure.  - If your IV site becomes painful, red, inflamed, or looks infected.    If you received a biopsy/polypectomy/sphincterotomy the following instructions apply below:    __ Do not use Aspirin containing products, non-steroidal medications or anti-coagulants for one week following your procedure. (Examples of these types of medications are: Advil,  Arthrotec, Aleve, Coumadin, Ecotrin, Heparin, Ibuprofen, Indocin, Motrin, Naprosyn, Nuprin, Plavix, Vioxx, and Voltarin, or their generic forms.  This list is not all-inclusive.  Check with your physician or pharmacist before resuming medications.)   __ Eat a soft diet today.  Avoid foods that are poorly digested for the next 24 hours.  These foods would include: nuts, beans, lettuce, red meats, and fried foods. Start with liquids and advance your diet as tolerated, gradually work up to eating solids.   __ Do not have a Barium Study or Enema for one week.    Your physician recommends the additional following instructions:    -You have a contact number available for emergencies. The signs and symptoms of potential delayed complications were discussed with you. You may return to normal activities tomorrow.  -Resume your previous diet.  -Continue your present medications.   -We are waiting for your pathology results.  -Your physician has recommended a repeat colonoscopy (date to be determined after pending pathology results are reviewed) for surveillance based on pathology results.  -The findings and recommendations have been discussed with you.  -The findings and recommendations were discussed with your family.  - Please see Medication Reconciliation Form for new medication/medications prescribed.       If you experience any problems or have any questions following discharge from the GI Lab, please call:        Nurse Signature                                                                        Date___________________                                                                            Patient/Responsible Party Signature                                        Date___________________

## 2025-06-19 NOTE — LETTER
[phone]      Dear Mr. Mckinley,       It was my pleasure to see you at your recent colonoscopy.  At that time,  your examination was completely normal.  The current recommendation is to repeat the colonoscopy in 5 years.      Thank you very much for allowing me to take part in your care, please feel free to contact me with any questions or concerns at [phone].          Sincerely,       Rosita Fowler M.D. FACS, FASCRS    CC:  Primary Care:

## 2025-06-19 NOTE — ANESTHESIA PREPROCEDURE EVALUATION
Patient: Franck Mckinley    Procedure Information       Date/Time: 06/19/25 0850    Scheduled providers: Rosita Fowler MD; Jacque Tijerina MD; FALLON Gabriel; Gino Sandra MA; Desmond Barahona RN    Procedure: COLONOSCOPY    Location: [facility]                                                         Pre-Anesthesia Evaluation                                         Franck Mckinley is a 59 y.o. male who presents for the above mentioned procedure due to Screening for colon cancer [Z12.11]    Medical History[1]  Surgical History[2]  Social History[3]  RX Allergies[4]  Current Medications[5]  Prior to Admission medications    Medication Sig Start Date End Date Taking? Authorizing Provider   acetaminophen (Tylenol) 325 mg tablet Take 2 tablets (650 mg) by mouth every 4 hours if needed (pain). 11/10/22  Yes Historical Provider, MD   amLODIPine (Norvasc) 10 mg tablet TAKE 1 TABLET DAILY 3/7/25  Yes Piper Alvarado MD   atomoxetine (Strattera) 10 mg capsule TAKE 1 CAPSULE ONCE DAILY. SWALLOW WHOLE; DO NOT OPEN. IF OPENED ACCIDENTALLY, DO NOT TOUCH EYES; WASH HANDS IMMEDIATELY (PRODUCT IS AN EYE IRRITANT) 4/24/25  Yes Piper Alvarado MD   cholecalciferol (Vitamin D-3) 25 MCG (1000 UT) tablet Take 1 tablet (25 mcg) by mouth once daily. 7/9/22  Yes Historical Provider, MD   cyanocobalamin (Vitamin B-12) 1,000 mcg/mL injection Inject 2 mL (2,000 mcg) into the muscle. per AQ. 1/19/22  Yes Historical Provider, MD   ergocalciferol (Vitamin D-2) 1250 mcg (50,000 units) capsule TAKE 1 CAPSULE BY MOUTH ONE TIME PER WEEK 3/2/25  Yes Janene Domínguez MD   fenofibrate (Tricor) 48 mg tablet Take 1 tablet (48 mg) by mouth once daily. as directed 9/26/24  Yes Janene Domínguez MD   Jardiance 25 mg tablet Take 1 tablet (25 mg) by mouth once daily. 6/10/25  Yes Janene Domínguez MD   losartan (Cozaar) 100 mg tablet Take 1 tablet (100 mg) by mouth once daily. 9/26/24  Yes Janene Domínguez,  "MD   pravastatin (Pravachol) 20 mg tablet Take 1 tablet (20 mg) by mouth once daily at bedtime. 6/10/25  Yes Janene Domínguez MD   sodium,potassium,mag sulfates (Suprep) 17.5-3.13-1.6 gram solution Please refer to the printed instructions that were mailed to you. 4/15/25  Yes Rosita Fowler MD   testosterone 20.25 mg/1.25 gram (1.62 %) gel in metered-dose pump Place 4 Pump (2 pumps to each shoulder) on the skin once daily. 1/13/25  Yes Paris Rosales MD   insulin aspart (NovoLOG Flexpen U-100 Insulin) 100 unit/mL (3 mL) pen Inject 12 units before each meal + sliding scale on Mon-Thur; Inject 20 units before each meal plus sliding scale on Fri-sun. Maximum daily dose 80 units 2/11/25   Janene Domínguez MD   insulin glargine (Toujeo SoloStar U-300 Insulin) 300 unit/mL (1.5 mL) pen INJECT 53 UNITS SUBCUTANEOUSLY AT BEDTIME 6/10/25   Janene Domínguez MD   pen needle, diabetic (BD Ultra-Fine Mini Pen Needle) 31 gauge x 3/16\" needle USE TO INJECT INSULIN 4    TIMES A DAY  Patient not taking: Reported on 6/10/2025 4/18/25   Janene Domínguez MD   sildenafil (Viagra) 100 mg tablet Take 1 tablet (100 mg) by mouth if needed for erectile dysfunction (Start with half tab. Take 1 hour prior to intercourse on an empty stomach. If you have an erection for over 4 hours, please go to the emergency room.). 1/13/25 2/12/25  Paris Rosales MD   testosterone 20.25 mg/1.25 gram (1.62 %) gel in metered-dose pump Place 4 Pump on the skin once daily. 2 pumps to each shoulder 4/1/25   Terrence Vallejo PA-C   testosterone 20.25 mg/1.25 gram (1.62 %) gel in metered-dose pump Place 2 pumps on the skin once daily. 1 pumps to each shoulder 5/12/25   Paris Rosales MD   tirzepatide (Mounjaro) 15 mg/0.5 mL pen injector Inject 15 mg under the skin 1 (one) time per week. 6/10/25 On hold since 6/14/25  Janene Domínguez MD     No medication comments found.  Visit Vitals  /86   Pulse 84   Temp 36.3 °C (97.3 °F) (Temporal) " "  Resp 16   Ht 2.083 m (6' 10\")   Wt (!) 158 kg (348 lb 5.2 oz)   SpO2 96%   BMI 36.42 kg/m²   Smoking Status Former   BSA 3.02 m²                    Medical Gas Therapy: None (Room air)        6/19/2025     7:54 AM 6/10/2025     8:56 AM 5/6/2025    11:49 AM 3/4/2025     9:32 AM 2/11/2025     8:36 AM 12/19/2024     2:30 PM 12/19/2024     2:24 PM   BP REVIEW   BP (ultimate) 153/86 131/82 142/82 142/84 151/78 145/85 150/74     Recent Labs     05/09/25  0850 05/09/25  0849 05/17/24  1103 02/16/24  1206   WBC 8.5  --   --  9.8   HGB 17.2*  --   --  16.3   HCT 53.3* 52.7*   < > 49.9     --   --  173   MCV 83.3  --   --  83    < > = values in this interval not displayed.     Recent Labs     05/09/25  0850 01/10/25  0849 07/27/23  0612 07/26/23  0534 07/25/23  0640 07/24/23  1121 07/23/23  1301   LACTATE  --   --   --   --   --   --  1.3   EGFR 71 81  --   --   --   --   --    ANIONGAP 12 13   < > 12 12   < > 10   BUN 24 17   < > 20 16   < > 20   CREATININE 1.18 1.06   < > 0.83 0.84   < > 1.17    144   < > 137 137   < > 137   K 3.8 4.6   < > 4.0 3.8   < > 4.4    106   < > 106 106   < > 104   CO2 25 30   < > 23 23   < > 27   GLUCOSE 106* 144*   < > 144* 136*   < > 211*   CALCIUM 9.4 9.9   < > 8.3* 8.4*   < > 8.9   PHOS  --   --   --  2.5 2.6   < >  --     < > = values in this interval not displayed.     Recent Labs     06/10/25  0903 05/09/25  0850 05/02/24  0808 02/16/24  1206 12/12/23  0930 03/17/23  0839 01/25/23  1023 12/21/22  0909 11/08/22  1820 02/24/22  1113 07/16/21  0839 06/16/20  0836 06/26/19  0920   HGBA1C 6.9*  --    < >  --    < > 8.3*  --  8.3*  --    < > 10.1   < > 9.2   TSH  --  2.66  --  2.87  --  5.04*   < > 4.13*  --    < > 3.24   < >  --    FREET4  --   --   --   --   --  1.23  --  1.15  --   --   --   --   --    PTH  --   --   --   --   --   --   --   --   --   --  55.2  --  70.1   TROPHS  --   --   --   --   --   --   --   --  10  --   --   --   --     < > = values in this interval " "not displayed.     Recent Labs     05/09/25  0850 01/10/25  0849 01/22/21  0847 06/16/20  0836 06/26/19  0920 12/22/18  0345   BILITOT 1.0 1.0   < > 0.7   < > 0.6   PROT 7.7 7.5   < > 7.1   < > 6.7   ALBUMIN 5.0 4.5   < > 4.5   < > 4.2   ALKPHOS 56 61   < > 57   < > 66   ALT 25 30   < > 32   < > 34   AST 17 19   < > 16   < > 18   LIPASE  --   --   --  23  --  17    < > = values in this interval not displayed.     EKG No results found for this or any previous visit (from the past 4464 hours).  Ejection Fractions:No results found for: \"EF\"  EchocardiogramNo results found for this or any previous visit from the past 29681 days.    Stress Testing  NM CARDIAC STRESS REST (MYOCARDIAL PERFUSION MIBI) 02/14/2017      FINDINGS:  Stress and rest images both demonstrate a normal distribution of  perfusion throughout all LV segments with no sign of ischemia.    ECG-gated images demonstrate normal LV size and myocardial  contractility with an LV ejection fraction of 54 % (normal above 45  percent).    Impression  1. Normal stress myocardial perfusion imaging in response to  pharmacologic stress.  2. Well-maintained left ventricular function.      Results from last 7 days   Lab Units 06/19/25  0804   POCT GLUCOSE mg/dL 102*             Relevant Problems   Cardiac   (+) Benign essential hypertension   (+) Peripheral arterial occlusive disease   (+) Pure hypercholesterolemia      Pulmonary   (+) MARGE (obstructive sleep apnea)   (+) Pulmonary nodules      Neuro   (+) Right cervical radiculopathy      /Renal   (+) Kidney stone on right side      Liver   (+) Liver lesion      Endocrine   (+) Diabetic neuropathy (Multi)      Hematology   (+) Anemia, pernicious   (+) Polycythemia      Musculoskeletal   (+) Tricompartment osteoarthritis of right knee      Sleep   (+) Suspected sleep apnea (Resolved)       Clinical information reviewed:   Tobacco  Allergies  Meds   Med Hx  Surg Hx   Fam Hx  Soc Hx        NPO Detail:  NPO/Void " Status  Carbohydrate Drink Given Prior to Surgery? : N  Date of Last Liquid: 06/19/25  Time of Last Liquid: 0200  Date of Last Solid: 06/17/25  Time of Last Solid: 1900  Last Intake Type: Clear fluids  Time of Last Void: 0755         Physical Exam    Airway  Mallampati: III  TM distance: >3 FB  Neck ROM: full  Mouth opening: 3 or more finger widths     Cardiovascular   Rhythm: regular  Rate: normal     Dental - normal exam     Pulmonary Comments: Normal RR  Non-labored respiration    Abdominal            Anesthesia Plan    History of general anesthesia?: yes  History of complications of general anesthesia?: no    ASA 3     MAC   (Standard ASA monitoring.)  intravenous induction   Anesthetic plan and risks discussed with patient.    Plan discussed with CRNA and CAA.             [1]   Past Medical History:  Diagnosis Date    Abnormal finding of blood chemistry, unspecified 06/21/2017    Abnormal blood chemistry    Acute pharyngitis, unspecified 11/11/2019    Pharyngitis    Acute pharyngitis, unspecified 08/18/2020    Sore throat    Anosmia 04/19/2021    Anosmia    Calculus of kidney 01/04/2021    Kidney stone on right side    Cellulitis, unspecified 12/21/2022    Cellulitis    Chronic sinusitis, unspecified 04/26/2018    Sinusitis    Cough, unspecified 04/19/2021    Cough    Disorder of kidney and ureter, unspecified 11/14/2022    Renal insufficiency    Dorsalgia, unspecified 12/22/2020    Back pain    Edema, unspecified 11/16/2022    Edema    Encounter for screening for malignant neoplasm of colon 02/11/2020    Colon cancer screening    Encounter for screening for malignant neoplasm of prostate 01/19/2022    Screening for prostate cancer    Encounter for screening for malignant neoplasm of prostate 05/19/2020    Encounter for prostate cancer screening    Essential (primary) hypertension 11/16/2022    Benign essential hypertension    Fever, unspecified 04/19/2021    Fever    Follicular cyst of the skin and  subcutaneous tissue, unspecified 11/11/2019    Infected cyst of skin    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    Low HDL (under 40)    Liver disease, unspecified 01/27/2021    Liver lesion    Male erectile dysfunction, unspecified 02/07/2020    Erectile dysfunction    Myalgia, unspecified site 08/18/2020    Myalgia    Other chest pain 01/19/2022    Chest discomfort    Other conditions influencing health status 01/19/2022    Skin tear    Other fatigue 03/21/2019    Fatigue    Other malaise 08/18/2020    Malaise and fatigue    Other nonspecific abnormal finding of lung field 02/10/2021    Abnormal CT scan, lung    Pain in right knee 06/26/2020    Right knee pain    Pain in right shoulder 03/30/2022    Right shoulder pain    Proteinuria, unspecified 11/14/2022    Proteinuria    Proteinuria, unspecified 11/13/2015    Proteinuria    Type 2 diabetes mellitus without complications 12/22/2022    Diabetes mellitus    Unspecified abdominal pain 05/26/2022    Abdominal pain    Unspecified open wound of unspecified toe(s) without damage to nail, initial encounter 03/16/2020    Wound, open, toe   [2]   Past Surgical History:  Procedure Laterality Date    AMPUTATION Left     left 2nd toe    HAND SURGERY  04/04/2014    Hand Surgery                                                                                                                                                          OTHER SURGICAL HISTORY  06/17/2020    Colonoscopy   [3]   Social History  Tobacco Use    Smoking status: Former     Types: Cigarettes    Smokeless tobacco: Never   Vaping Use    Vaping status: Never Used   Substance Use Topics    Alcohol use: Not Currently    Drug use: Not Currently     Comment: for sleep   [4]   Allergies  Allergen Reactions    Chocolate Unknown   [5]   Current Outpatient Medications:     acetaminophen (Tylenol) 325 mg tablet, Take 2 tablets (650 mg) by mouth every 4 hours if needed (pain)., Disp: , Rfl:     amLODIPine (Norvasc) 10 mg  "tablet, TAKE 1 TABLET DAILY, Disp: 90 tablet, Rfl: 1    atomoxetine (Strattera) 10 mg capsule, TAKE 1 CAPSULE ONCE DAILY. SWALLOW WHOLE; DO NOT OPEN. IF OPENED ACCIDENTALLY, DO NOT TOUCH EYES; WASH HANDS IMMEDIATELY (PRODUCT IS AN EYE IRRITANT), Disp: 90 capsule, Rfl: 0    cholecalciferol (Vitamin D-3) 25 MCG (1000 UT) tablet, Take 1 tablet (25 mcg) by mouth once daily., Disp: , Rfl:     cyanocobalamin (Vitamin B-12) 1,000 mcg/mL injection, Inject 2 mL (2,000 mcg) into the muscle. per AQ., Disp: , Rfl:     ergocalciferol (Vitamin D-2) 1250 mcg (50,000 units) capsule, TAKE 1 CAPSULE BY MOUTH ONE TIME PER WEEK, Disp: 4 capsule, Rfl: 5    fenofibrate (Tricor) 48 mg tablet, Take 1 tablet (48 mg) by mouth once daily. as directed, Disp: 90 tablet, Rfl: 3    Jardiance 25 mg tablet, Take 1 tablet (25 mg) by mouth once daily., Disp: 90 tablet, Rfl: 3    losartan (Cozaar) 100 mg tablet, Take 1 tablet (100 mg) by mouth once daily., Disp: 90 tablet, Rfl: 3    pravastatin (Pravachol) 20 mg tablet, Take 1 tablet (20 mg) by mouth once daily at bedtime., Disp: 90 tablet, Rfl: 3    sodium,potassium,mag sulfates (Suprep) 17.5-3.13-1.6 gram solution, Please refer to the printed instructions that were mailed to you., Disp: 360 mL, Rfl: 0    testosterone 20.25 mg/1.25 gram (1.62 %) gel in metered-dose pump, Place 4 Pump (2 pumps to each shoulder) on the skin once daily., Disp: 150 g, Rfl: 2    insulin aspart (NovoLOG Flexpen U-100 Insulin) 100 unit/mL (3 mL) pen, Inject 12 units before each meal + sliding scale on Mon-Thur; Inject 20 units before each meal plus sliding scale on Fri-sun. Maximum daily dose 80 units, Disp: 75 mL, Rfl: 3    insulin glargine (Toujeo SoloStar U-300 Insulin) 300 unit/mL (1.5 mL) pen, INJECT 53 UNITS SUBCUTANEOUSLY AT BEDTIME, Disp: 18 mL, Rfl: 3    pen needle, diabetic (BD Ultra-Fine Mini Pen Needle) 31 gauge x 3/16\" needle, USE TO INJECT INSULIN 4    TIMES A DAY (Patient not taking: Reported on 6/10/2025), " Disp: 400 each, Rfl: 1    sildenafil (Viagra) 100 mg tablet, Take 1 tablet (100 mg) by mouth if needed for erectile dysfunction (Start with half tab. Take 1 hour prior to intercourse on an empty stomach. If you have an erection for over 4 hours, please go to the emergency room.)., Disp: 30 tablet, Rfl: 6    testosterone 20.25 mg/1.25 gram (1.62 %) gel in metered-dose pump, Place 4 Pump on the skin once daily. 2 pumps to each shoulder, Disp: 150 g, Rfl: 1    testosterone 20.25 mg/1.25 gram (1.62 %) gel in metered-dose pump, Place 2 pumps on the skin once daily. 1 pumps to each shoulder, Disp: 150 g, Rfl: 3    tirzepatide (Mounjaro) 15 mg/0.5 mL pen injector, Inject 15 mg under the skin 1 (one) time per week., Disp: 6 mL, Rfl: 3    Current Facility-Administered Medications:     cyanocobalamin (Vitamin B-12) injection 1,000 mcg, 1,000 mcg, intramuscular, Once, Piper Alvarado MD

## 2025-06-19 NOTE — ANESTHESIA POSTPROCEDURE EVALUATION
Patient: Franck Mckinley    Procedure Summary       Date: 06/19/25 Room / Location: [facility]    Anesthesia Start: 0852 Anesthesia Stop: 0923    Procedure: COLONOSCOPY Diagnosis: Screening for colon cancer    Scheduled Providers: Rosita Fowler MD; Jacque Tijerina MD; FALLON Gabriel; Gino Sandra MA; Desmond Barahona RN Responsible Provider: Jacque Tijerina MD    Anesthesia Type: MAC ASA Status: 3            Anesthesia Type: MAC    Vitals Value Taken Time   /80 06/19/25 09:48   Temp 36.8 °C (98.2 °F) 06/19/25 09:18   Pulse 70 06/19/25 09:48   Resp 14 06/19/25 09:48   SpO2 97 % 06/19/25 09:48       Anesthesia Post Evaluation    Patient location during evaluation: PACU  Patient participation: complete - patient participated  Level of consciousness: awake  Pain management: adequate  Multimodal analgesia pain management approach  Airway patency: patent  Cardiovascular status: hemodynamically stable  Respiratory status: spontaneous ventilation  Hydration status: euvolemic  Postoperative Nausea and Vomiting: none        No notable events documented.

## 2025-06-20 PROCEDURE — RXMED WILLOW AMBULATORY MEDICATION CHARGE

## 2025-06-20 ASSESSMENT — PAIN SCALES - GENERAL: PAINLEVEL_OUTOF10: 0 - NO PAIN

## 2025-06-25 ENCOUNTER — PHARMACY VISIT (OUTPATIENT)
Dept: PHARMACY | Facility: CLINIC | Age: 60
End: 2025-06-25
Payer: MEDICARE

## 2025-06-26 PROCEDURE — RXMED WILLOW AMBULATORY MEDICATION CHARGE

## 2025-06-28 ENCOUNTER — HOSPITAL ENCOUNTER (INPATIENT)
Facility: HOSPITAL | Age: 60
DRG: 074 | End: 2025-06-28
Attending: GENERAL PRACTICE | Admitting: INTERNAL MEDICINE
Payer: COMMERCIAL

## 2025-06-28 ENCOUNTER — APPOINTMENT (OUTPATIENT)
Dept: RADIOLOGY | Facility: HOSPITAL | Age: 60
DRG: 074 | End: 2025-06-28
Payer: COMMERCIAL

## 2025-06-28 DIAGNOSIS — M14.60 CHARCOT ARTHROPATHY: ICD-10-CM

## 2025-06-28 DIAGNOSIS — L08.9 DIABETIC FOOT INFECTION (MULTI): ICD-10-CM

## 2025-06-28 DIAGNOSIS — E11.628 DIABETIC FOOT INFECTION (MULTI): ICD-10-CM

## 2025-06-28 LAB
ANION GAP SERPL CALC-SCNC: 11 MMOL/L (ref 10–20)
BASOPHILS # BLD AUTO: 0.06 X10*3/UL (ref 0–0.1)
BASOPHILS NFR BLD AUTO: 0.6 %
BUN SERPL-MCNC: 23 MG/DL (ref 6–23)
CALCIUM SERPL-MCNC: 8.8 MG/DL (ref 8.6–10.3)
CHLORIDE SERPL-SCNC: 109 MMOL/L (ref 98–107)
CO2 SERPL-SCNC: 24 MMOL/L (ref 21–32)
CREAT SERPL-MCNC: 1.21 MG/DL (ref 0.5–1.3)
CRP SERPL-MCNC: 9.14 MG/DL
EGFRCR SERPLBLD CKD-EPI 2021: 69 ML/MIN/1.73M*2
EOSINOPHIL # BLD AUTO: 0.47 X10*3/UL (ref 0–0.7)
EOSINOPHIL NFR BLD AUTO: 4.5 %
ERYTHROCYTE [DISTWIDTH] IN BLOOD BY AUTOMATED COUNT: 13.9 % (ref 11.5–14.5)
ERYTHROCYTE [SEDIMENTATION RATE] IN BLOOD BY WESTERGREN METHOD: 62 MM/H (ref 0–20)
GLUCOSE SERPL-MCNC: 74 MG/DL (ref 74–99)
HCT VFR BLD AUTO: 43.1 % (ref 41–52)
HGB BLD-MCNC: 13.5 G/DL (ref 13.5–17.5)
IMM GRANULOCYTES # BLD AUTO: 0.11 X10*3/UL (ref 0–0.7)
IMM GRANULOCYTES NFR BLD AUTO: 1.1 % (ref 0–0.9)
LYMPHOCYTES # BLD AUTO: 2 X10*3/UL (ref 1.2–4.8)
LYMPHOCYTES NFR BLD AUTO: 19.2 %
MCH RBC QN AUTO: 26.2 PG (ref 26–34)
MCHC RBC AUTO-ENTMCNC: 31.3 G/DL (ref 32–36)
MCV RBC AUTO: 84 FL (ref 80–100)
MONOCYTES # BLD AUTO: 0.85 X10*3/UL (ref 0.1–1)
MONOCYTES NFR BLD AUTO: 8.2 %
NEUTROPHILS # BLD AUTO: 6.9 X10*3/UL (ref 1.2–7.7)
NEUTROPHILS NFR BLD AUTO: 66.4 %
NRBC BLD-RTO: 0 /100 WBCS (ref 0–0)
PLATELET # BLD AUTO: 266 X10*3/UL (ref 150–450)
POTASSIUM SERPL-SCNC: 3.9 MMOL/L (ref 3.5–5.3)
RBC # BLD AUTO: 5.16 X10*6/UL (ref 4.5–5.9)
SODIUM SERPL-SCNC: 140 MMOL/L (ref 136–145)
WBC # BLD AUTO: 10.4 X10*3/UL (ref 4.4–11.3)

## 2025-06-28 PROCEDURE — 99285 EMERGENCY DEPT VISIT HI MDM: CPT | Mod: 25 | Performed by: GENERAL PRACTICE

## 2025-06-28 PROCEDURE — 86140 C-REACTIVE PROTEIN: CPT | Performed by: GENERAL PRACTICE

## 2025-06-28 PROCEDURE — 83036 HEMOGLOBIN GLYCOSYLATED A1C: CPT | Mod: AHULAB

## 2025-06-28 PROCEDURE — 36415 COLL VENOUS BLD VENIPUNCTURE: CPT | Performed by: GENERAL PRACTICE

## 2025-06-28 PROCEDURE — 96375 TX/PRO/DX INJ NEW DRUG ADDON: CPT

## 2025-06-28 PROCEDURE — 85652 RBC SED RATE AUTOMATED: CPT | Performed by: GENERAL PRACTICE

## 2025-06-28 PROCEDURE — 2500000005 HC RX 250 GENERAL PHARMACY W/O HCPCS: Performed by: PHARMACIST

## 2025-06-28 PROCEDURE — 1210000001 HC SEMI-PRIVATE ROOM DAILY

## 2025-06-28 PROCEDURE — 2500000004 HC RX 250 GENERAL PHARMACY W/ HCPCS (ALT 636 FOR OP/ED): Performed by: PHARMACIST

## 2025-06-28 PROCEDURE — 96365 THER/PROPH/DIAG IV INF INIT: CPT

## 2025-06-28 PROCEDURE — 87040 BLOOD CULTURE FOR BACTERIA: CPT | Mod: AHULAB | Performed by: GENERAL PRACTICE

## 2025-06-28 PROCEDURE — 73630 X-RAY EXAM OF FOOT: CPT | Mod: RIGHT SIDE | Performed by: RADIOLOGY

## 2025-06-28 PROCEDURE — 2500000004 HC RX 250 GENERAL PHARMACY W/ HCPCS (ALT 636 FOR OP/ED): Performed by: GENERAL PRACTICE

## 2025-06-28 PROCEDURE — 85025 COMPLETE CBC W/AUTO DIFF WBC: CPT | Performed by: GENERAL PRACTICE

## 2025-06-28 PROCEDURE — 73630 X-RAY EXAM OF FOOT: CPT | Mod: RT

## 2025-06-28 PROCEDURE — 80048 BASIC METABOLIC PNL TOTAL CA: CPT | Performed by: GENERAL PRACTICE

## 2025-06-28 RX ORDER — TALC
3 POWDER (GRAM) TOPICAL NIGHTLY PRN
Status: DISCONTINUED | OUTPATIENT
Start: 2025-06-28 | End: 2025-07-01 | Stop reason: HOSPADM

## 2025-06-28 RX ORDER — LOSARTAN POTASSIUM 50 MG/1
100 TABLET ORAL DAILY
Status: DISCONTINUED | OUTPATIENT
Start: 2025-06-29 | End: 2025-07-01 | Stop reason: HOSPADM

## 2025-06-28 RX ORDER — INSULIN LISPRO 100 [IU]/ML
0-5 INJECTION, SOLUTION INTRAVENOUS; SUBCUTANEOUS
Status: DISCONTINUED | OUTPATIENT
Start: 2025-06-29 | End: 2025-07-01 | Stop reason: HOSPADM

## 2025-06-28 RX ORDER — VANCOMYCIN HYDROCHLORIDE 1 G/20ML
INJECTION, POWDER, LYOPHILIZED, FOR SOLUTION INTRAVENOUS DAILY PRN
Status: DISCONTINUED | OUTPATIENT
Start: 2025-06-28 | End: 2025-06-28 | Stop reason: DRUGHIGH

## 2025-06-28 RX ORDER — PANTOPRAZOLE SODIUM 40 MG/1
40 TABLET, DELAYED RELEASE ORAL
Status: DISCONTINUED | OUTPATIENT
Start: 2025-06-29 | End: 2025-07-01 | Stop reason: HOSPADM

## 2025-06-28 RX ORDER — ONDANSETRON HYDROCHLORIDE 2 MG/ML
4 INJECTION, SOLUTION INTRAVENOUS EVERY 6 HOURS PRN
Status: DISCONTINUED | OUTPATIENT
Start: 2025-06-28 | End: 2025-07-01 | Stop reason: HOSPADM

## 2025-06-28 RX ORDER — DOCUSATE SODIUM 100 MG/1
100 CAPSULE, LIQUID FILLED ORAL 2 TIMES DAILY
Status: DISCONTINUED | OUTPATIENT
Start: 2025-06-28 | End: 2025-07-01 | Stop reason: HOSPADM

## 2025-06-28 RX ORDER — CLINDAMYCIN PHOSPHATE 600 MG/50ML
600 INJECTION, SOLUTION INTRAVENOUS ONCE
Status: COMPLETED | OUTPATIENT
Start: 2025-06-28 | End: 2025-06-28

## 2025-06-28 RX ORDER — TRAMADOL HYDROCHLORIDE 50 MG/1
50 TABLET, FILM COATED ORAL EVERY 6 HOURS PRN
Status: DISCONTINUED | OUTPATIENT
Start: 2025-06-28 | End: 2025-07-01 | Stop reason: HOSPADM

## 2025-06-28 RX ORDER — ACETAMINOPHEN 325 MG/1
650 TABLET ORAL EVERY 6 HOURS PRN
Status: DISCONTINUED | OUTPATIENT
Start: 2025-06-28 | End: 2025-07-01 | Stop reason: HOSPADM

## 2025-06-28 RX ORDER — AMLODIPINE BESYLATE 10 MG/1
10 TABLET ORAL DAILY
Status: DISCONTINUED | OUTPATIENT
Start: 2025-06-29 | End: 2025-07-01 | Stop reason: HOSPADM

## 2025-06-28 RX ORDER — CEFAZOLIN SODIUM 2 G/50ML
2 SOLUTION INTRAVENOUS EVERY 8 HOURS
Status: DISCONTINUED | OUTPATIENT
Start: 2025-06-29 | End: 2025-06-29 | Stop reason: ALTCHOICE

## 2025-06-28 RX ORDER — INSULIN GLARGINE 100 [IU]/ML
50 INJECTION, SOLUTION SUBCUTANEOUS EVERY 24 HOURS
Status: DISCONTINUED | OUTPATIENT
Start: 2025-06-28 | End: 2025-06-29

## 2025-06-28 RX ADMIN — CLINDAMYCIN PHOSPHATE 600 MG: 600 INJECTION, SOLUTION INTRAVENOUS at 21:28

## 2025-06-28 RX ADMIN — VANCOMYCIN HYDROCHLORIDE 2000 MG: 5 INJECTION, POWDER, LYOPHILIZED, FOR SOLUTION INTRAVENOUS at 23:08

## 2025-06-29 VITALS
SYSTOLIC BLOOD PRESSURE: 153 MMHG | HEIGHT: 78 IN | RESPIRATION RATE: 18 BRPM | BODY MASS INDEX: 36.45 KG/M2 | DIASTOLIC BLOOD PRESSURE: 87 MMHG | WEIGHT: 315 LBS | OXYGEN SATURATION: 96 % | TEMPERATURE: 97.5 F | HEART RATE: 71 BPM

## 2025-06-29 LAB
BACTERIA BLD CULT: NORMAL
GLUCOSE BLD MANUAL STRIP-MCNC: 170 MG/DL (ref 74–99)

## 2025-06-29 PROCEDURE — 82947 ASSAY GLUCOSE BLOOD QUANT: CPT

## 2025-06-29 PROCEDURE — 1100000001 HC PRIVATE ROOM DAILY

## 2025-06-29 PROCEDURE — 2500000001 HC RX 250 WO HCPCS SELF ADMINISTERED DRUGS (ALT 637 FOR MEDICARE OP): Performed by: INTERNAL MEDICINE

## 2025-06-29 PROCEDURE — 2500000004 HC RX 250 GENERAL PHARMACY W/ HCPCS (ALT 636 FOR OP/ED): Performed by: PHARMACIST

## 2025-06-29 PROCEDURE — 99222 1ST HOSP IP/OBS MODERATE 55: CPT | Performed by: INTERNAL MEDICINE

## 2025-06-29 PROCEDURE — 2500000002 HC RX 250 W HCPCS SELF ADMINISTERED DRUGS (ALT 637 FOR MEDICARE OP, ALT 636 FOR OP/ED): Performed by: INTERNAL MEDICINE

## 2025-06-29 RX ORDER — ENOXAPARIN SODIUM 100 MG/ML
40 INJECTION SUBCUTANEOUS EVERY 24 HOURS
Status: DISCONTINUED | OUTPATIENT
Start: 2025-06-29 | End: 2025-07-01 | Stop reason: HOSPADM

## 2025-06-29 RX ORDER — VANCOMYCIN HYDROCHLORIDE 1 G/20ML
INJECTION, POWDER, LYOPHILIZED, FOR SOLUTION INTRAVENOUS DAILY PRN
Status: DISCONTINUED | OUTPATIENT
Start: 2025-06-29 | End: 2025-07-01 | Stop reason: HOSPADM

## 2025-06-29 RX ORDER — INSULIN GLARGINE 100 [IU]/ML
43 INJECTION, SOLUTION SUBCUTANEOUS EVERY 24 HOURS
Status: DISCONTINUED | OUTPATIENT
Start: 2025-06-29 | End: 2025-07-01 | Stop reason: HOSPADM

## 2025-06-29 RX ORDER — PRAVASTATIN SODIUM 20 MG/1
20 TABLET ORAL NIGHTLY
Status: DISCONTINUED | OUTPATIENT
Start: 2025-06-29 | End: 2025-07-01 | Stop reason: HOSPADM

## 2025-06-29 RX ORDER — FENOFIBRATE 54 MG/1
54 TABLET ORAL DAILY
Status: DISCONTINUED | OUTPATIENT
Start: 2025-06-29 | End: 2025-07-01 | Stop reason: HOSPADM

## 2025-06-29 RX ADMIN — EMPAGLIFLOZIN 25 MG: 10 TABLET, FILM COATED ORAL at 12:58

## 2025-06-29 RX ADMIN — AMLODIPINE BESYLATE 10 MG: 10 TABLET ORAL at 08:43

## 2025-06-29 RX ADMIN — INSULIN GLARGINE 43 UNITS: 100 INJECTION, SOLUTION SUBCUTANEOUS at 22:00

## 2025-06-29 RX ADMIN — PRAVASTATIN SODIUM 20 MG: 20 TABLET ORAL at 22:00

## 2025-06-29 RX ADMIN — PIPERACILLIN SODIUM AND TAZOBACTAM SODIUM 3.38 G: 3; .375 INJECTION, SOLUTION INTRAVENOUS at 13:00

## 2025-06-29 RX ADMIN — INSULIN LISPRO 1 UNITS: 100 INJECTION, SOLUTION INTRAVENOUS; SUBCUTANEOUS at 22:00

## 2025-06-29 RX ADMIN — PIPERACILLIN SODIUM AND TAZOBACTAM SODIUM 3.38 G: 3; .375 INJECTION, SOLUTION INTRAVENOUS at 20:00

## 2025-06-29 RX ADMIN — FENOFIBRATE 54 MG: 54 TABLET ORAL at 12:58

## 2025-06-29 RX ADMIN — PIPERACILLIN SODIUM AND TAZOBACTAM SODIUM 3.38 G: 3; .375 INJECTION, SOLUTION INTRAVENOUS at 01:48

## 2025-06-29 RX ADMIN — LOSARTAN POTASSIUM 100 MG: 50 TABLET, FILM COATED ORAL at 08:43

## 2025-06-29 RX ADMIN — PIPERACILLIN SODIUM AND TAZOBACTAM SODIUM 3.38 G: 3; .375 INJECTION, SOLUTION INTRAVENOUS at 08:44

## 2025-06-29 SDOH — SOCIAL STABILITY: SOCIAL INSECURITY: ARE YOU OR HAVE YOU BEEN THREATENED OR ABUSED PHYSICALLY, EMOTIONALLY, OR SEXUALLY BY ANYONE?: NO

## 2025-06-29 SDOH — ECONOMIC STABILITY: FOOD INSECURITY: HOW HARD IS IT FOR YOU TO PAY FOR THE VERY BASICS LIKE FOOD, HOUSING, MEDICAL CARE, AND HEATING?: PATIENT DECLINED

## 2025-06-29 SDOH — ECONOMIC STABILITY: FOOD INSECURITY: WITHIN THE PAST 12 MONTHS, THE FOOD YOU BOUGHT JUST DIDN'T LAST AND YOU DIDN'T HAVE MONEY TO GET MORE.: PATIENT DECLINED

## 2025-06-29 SDOH — ECONOMIC STABILITY: FOOD INSECURITY
WITHIN THE PAST 12 MONTHS, YOU WORRIED THAT YOUR FOOD WOULD RUN OUT BEFORE YOU GOT THE MONEY TO BUY MORE.: PATIENT DECLINED

## 2025-06-29 SDOH — SOCIAL STABILITY: SOCIAL INSECURITY: DO YOU FEEL ANYONE HAS EXPLOITED OR TAKEN ADVANTAGE OF YOU FINANCIALLY OR OF YOUR PERSONAL PROPERTY?: NO

## 2025-06-29 SDOH — SOCIAL STABILITY: SOCIAL INSECURITY
WITHIN THE LAST YEAR, HAVE YOU BEEN RAPED OR FORCED TO HAVE ANY KIND OF SEXUAL ACTIVITY BY YOUR PARTNER OR EX-PARTNER?: NO

## 2025-06-29 SDOH — ECONOMIC STABILITY: HOUSING INSECURITY: IN THE LAST 12 MONTHS, WAS THERE A TIME WHEN YOU WERE NOT ABLE TO PAY THE MORTGAGE OR RENT ON TIME?: PATIENT DECLINED

## 2025-06-29 SDOH — ECONOMIC STABILITY: INCOME INSECURITY
IN THE PAST 12 MONTHS HAS THE ELECTRIC, GAS, OIL, OR WATER COMPANY THREATENED TO SHUT OFF SERVICES IN YOUR HOME?: PATIENT DECLINED

## 2025-06-29 SDOH — SOCIAL STABILITY: SOCIAL INSECURITY: WITHIN THE LAST YEAR, HAVE YOU BEEN HUMILIATED OR EMOTIONALLY ABUSED IN OTHER WAYS BY YOUR PARTNER OR EX-PARTNER?: NO

## 2025-06-29 SDOH — SOCIAL STABILITY: SOCIAL INSECURITY
WITHIN THE LAST YEAR, HAVE YOU BEEN KICKED, HIT, SLAPPED, OR OTHERWISE PHYSICALLY HURT BY YOUR PARTNER OR EX-PARTNER?: NO

## 2025-06-29 SDOH — ECONOMIC STABILITY: HOUSING INSECURITY: AT ANY TIME IN THE PAST 12 MONTHS, WERE YOU HOMELESS OR LIVING IN A SHELTER (INCLUDING NOW)?: PATIENT DECLINED

## 2025-06-29 SDOH — SOCIAL STABILITY: SOCIAL INSECURITY: DO YOU FEEL UNSAFE GOING BACK TO THE PLACE WHERE YOU ARE LIVING?: NO

## 2025-06-29 SDOH — SOCIAL STABILITY: SOCIAL INSECURITY: DOES ANYONE TRY TO KEEP YOU FROM HAVING/CONTACTING OTHER FRIENDS OR DOING THINGS OUTSIDE YOUR HOME?: NO

## 2025-06-29 SDOH — ECONOMIC STABILITY: HOUSING INSECURITY: IN THE PAST 12 MONTHS, HOW MANY TIMES HAVE YOU MOVED WHERE YOU WERE LIVING?: 0

## 2025-06-29 SDOH — SOCIAL STABILITY: SOCIAL INSECURITY: WITHIN THE LAST YEAR, HAVE YOU BEEN AFRAID OF YOUR PARTNER OR EX-PARTNER?: NO

## 2025-06-29 SDOH — SOCIAL STABILITY: SOCIAL INSECURITY: ABUSE: ADULT

## 2025-06-29 SDOH — SOCIAL STABILITY: SOCIAL INSECURITY: HAS ANYONE EVER THREATENED TO HURT YOUR FAMILY OR YOUR PETS?: NO

## 2025-06-29 SDOH — SOCIAL STABILITY: SOCIAL INSECURITY: HAVE YOU HAD THOUGHTS OF HARMING ANYONE ELSE?: NO

## 2025-06-29 SDOH — SOCIAL STABILITY: SOCIAL INSECURITY: HAVE YOU HAD ANY THOUGHTS OF HARMING ANYONE ELSE?: NO

## 2025-06-29 SDOH — SOCIAL STABILITY: SOCIAL INSECURITY: ARE THERE ANY APPARENT SIGNS OF INJURIES/BEHAVIORS THAT COULD BE RELATED TO ABUSE/NEGLECT?: NO

## 2025-06-29 SDOH — ECONOMIC STABILITY: TRANSPORTATION INSECURITY
IN THE PAST 12 MONTHS, HAS LACK OF TRANSPORTATION KEPT YOU FROM MEDICAL APPOINTMENTS OR FROM GETTING MEDICATIONS?: PATIENT DECLINED

## 2025-06-29 ASSESSMENT — COGNITIVE AND FUNCTIONAL STATUS - GENERAL
DAILY ACTIVITIY SCORE: 24
MOBILITY SCORE: 24
PATIENT BASELINE BEDBOUND: NO
MOBILITY SCORE: 24
DAILY ACTIVITIY SCORE: 24

## 2025-06-29 ASSESSMENT — PATIENT HEALTH QUESTIONNAIRE - PHQ9
1. LITTLE INTEREST OR PLEASURE IN DOING THINGS: NOT AT ALL
SUM OF ALL RESPONSES TO PHQ9 QUESTIONS 1 & 2: 0
2. FEELING DOWN, DEPRESSED OR HOPELESS: NOT AT ALL

## 2025-06-29 ASSESSMENT — ENCOUNTER SYMPTOMS
CARDIOVASCULAR NEGATIVE: 1
PSYCHIATRIC NEGATIVE: 1
EYES NEGATIVE: 1
WOUND: 1
CONSTITUTIONAL NEGATIVE: 1
RESPIRATORY NEGATIVE: 1
GASTROINTESTINAL NEGATIVE: 1
MUSCULOSKELETAL NEGATIVE: 1
NEUROLOGICAL NEGATIVE: 1
ALLERGIC/IMMUNOLOGIC NEGATIVE: 1

## 2025-06-29 ASSESSMENT — LIFESTYLE VARIABLES
HOW OFTEN DO YOU HAVE A DRINK CONTAINING ALCOHOL: NEVER
HOW MANY STANDARD DRINKS CONTAINING ALCOHOL DO YOU HAVE ON A TYPICAL DAY: PATIENT DOES NOT DRINK
AUDIT-C TOTAL SCORE: 0
HOW OFTEN DO YOU HAVE 6 OR MORE DRINKS ON ONE OCCASION: NEVER
AUDIT-C TOTAL SCORE: 0
SKIP TO QUESTIONS 9-10: 1

## 2025-06-29 ASSESSMENT — PAIN - FUNCTIONAL ASSESSMENT
PAIN_FUNCTIONAL_ASSESSMENT: 0-10

## 2025-06-29 ASSESSMENT — PAIN SCALES - GENERAL
PAINLEVEL_OUTOF10: 0 - NO PAIN
PAINLEVEL_OUTOF10: 0 - NO PAIN
PAINLEVEL_OUTOF10: 2

## 2025-06-29 ASSESSMENT — ACTIVITIES OF DAILY LIVING (ADL)
GROOMING: INDEPENDENT
ADEQUATE_TO_COMPLETE_ADL: YES
JUDGMENT_ADEQUATE_SAFELY_COMPLETE_DAILY_ACTIVITIES: YES
HEARING - LEFT EAR: FUNCTIONAL
FEEDING YOURSELF: INDEPENDENT
BATHING: INDEPENDENT
LACK_OF_TRANSPORTATION: PATIENT DECLINED
TOILETING: INDEPENDENT
PATIENT'S MEMORY ADEQUATE TO SAFELY COMPLETE DAILY ACTIVITIES?: YES
DRESSING YOURSELF: INDEPENDENT
HEARING - RIGHT EAR: FUNCTIONAL
ASSISTIVE_DEVICE: EYEGLASSES
WALKS IN HOME: INDEPENDENT

## 2025-06-29 NOTE — ED PROVIDER NOTES
HPI   Chief Complaint   Patient presents with    Toe Pain       HPI: 59-year-old male with a history of DM2 and HTN presents for cellulitis affecting the second right toe.  He is followed by podiatry as an outpatient and has been taking doxycycline for the past 3 days.  He denies fever and chills but has had significant worsening of his second toe swelling and erythema over the past day      Limitations to history: None  Independent Historians: Patient  External Records Reviewed: [facility], outpatient notes, inpatient notes  ------------------------------------------------------------------------------------------------------------------------------------------  ROS: a ten point review of systems was performed and was negative except as per HPI.  ------------------------------------------------------------------------------------------------------------------------------------------  PMH / PSH: as per HPI, otherwise reviewed in EMR  MEDS: as per HPI, otherwise reviewed in EMR  ALLERGIES: as per HPI, otherwise reviewed in EMR  SocH:  as per HPI, otherwise reviewed in EMR  FH:  as per HPI, otherwise reviewed in EMR  ------------------------------------------------------------------------------------------------------------------------------------------  Physical Exam:  VS: As documented in the triage note and EMR flowsheet from this visit was reviewed  General: Well appearing. No acute distress.   Eyes:  Extraocular movements grossly intact. No scleral icterus. No discharge  HEENT:  Normocephalic.  Atraumatic  Neck: Moves neck freely. No gross masses  CV: Regular rhythm. No murmurs, rubs or gallops   Resp: Clear to auscultation bilaterally. No respiratory distress.    GI: Soft, no masses, nontender. No rebound tenderness or guarding  MSK: Symmetric muscle bulk.  The second toe is diffusely edematous and erythematous.  Skin: Warm, dry, intact.   Neuro: No focal deficits.  A&O x3.   Psych: Appropriate for  situation  ------------------------------------------------------------------------------------------------------------------------------------------  Hospital Course / Medical Decision Making:  Independent Interpretations: X-ray foot  EKG as interpreted by me: N/A    MDM: 59-year-old male with a history of DM2 and HTN presents for cellulitis affecting the second right toe.  He is afebrile and in no acute distress.  He declined analgesics.  Originally I gave him clindamycin but there are findings of osteomyelitis on x-ray of his foot so vancomycin was added.  The radiologist is interpreting some of the findings as representing septic arthritis.  The patient has no leukocytosis and is well-appearing.  Given his history of diabetes I did also order Zosyn to cover for Pseudomonas.  The patient was admitted to the service of Dr. Ward and will require podiatric evaluation    Discussion of Management with Other Providers:   I discussed the patient/results with: Emergency medicine team    Final diagnosis and disposition as below.    Results for orders placed or performed during the hospital encounter of 06/28/25  -CBC and Auto Differential:   Collection Time: 06/28/25  9:18 PM       Result                      Value             Ref Range           WBC                         10.4              4.4 - 11.3 x*       nRBC                        0.0               0.0 - 0.0 /1*       RBC                         5.16              4.50 - 5.90 *       Hemoglobin                  13.5              13.5 - 17.5 *       Hematocrit                  43.1              41.0 - 52.0 %       MCV                         84                80 - 100 fL         MCH                         26.2              26.0 - 34.0 *       MCHC                        31.3 (L)          32.0 - 36.0 *       RDW                         13.9              11.5 - 14.5 %       Platelets                   266               150 - 450 x1*       Neutrophils %                66.4              40.0 - 80.0 %       Immature Granulocytes *     1.1 (H)           0.0 - 0.9 %         Lymphocytes %               19.2              13.0 - 44.0 %       Monocytes %                 8.2               2.0 - 10.0 %        Eosinophils %               4.5               0.0 - 6.0 %         Basophils %                 0.6               0.0 - 2.0 %         Neutrophils Absolute        6.90              1.20 - 7.70 *       Immature Granulocytes *     0.11              0.00 - 0.70 *       Lymphocytes Absolute        2.00              1.20 - 4.80 *       Monocytes Absolute          0.85              0.10 - 1.00 *       Eosinophils Absolute        0.47              0.00 - 0.70 *       Basophils Absolute          0.06              0.00 - 0.10 *  -Basic metabolic panel:   Collection Time: 06/28/25  9:18 PM       Result                      Value             Ref Range           Glucose                     74                74 - 99 mg/dL       Sodium                      140               136 - 145 mm*       Potassium                   3.9               3.5 - 5.3 mm*       Chloride                    109 (H)           98 - 107 mmo*       Bicarbonate                 24                21 - 32 mmol*       Anion Gap                   11                10 - 20 mmol*       Urea Nitrogen               23                6 - 23 mg/dL        Creatinine                  1.21              0.50 - 1.30 *       eGFR                        69                >60 mL/min/1*       Calcium                     8.8               8.6 - 10.3 m*  -C-reactive protein:   Collection Time: 06/28/25  9:18 PM       Result                      Value             Ref Range           C-Reactive Protein          9.14 (H)          <1.00 mg/dL    -Sedimentation rate, automated:   Collection Time: 06/28/25  9:18 PM       Result                      Value             Ref Range           Sedimentation Rate          62 (H)            0 - 20 mm/h    XR foot  right 3+ views   Final Result    Severe destructive arthritis in the 2nd MTP joint with erosive    changes and osseous destruction of the 2nd proximal phalangeal base    of the 2nd metatarsal head consistent with septic arthritis and    osteomyelitis. Septic arthritis also present in the 1st MTP joint    with widening and subluxation of the joint along with erosive changes    of 1st metatarsal head. Findings concerning for osteomyelitis in the    3rd distal metatarsal joint as well versus remote fracture deformity.    Soft tissue edema about the forefoot predominately in the 2nd toe.    MRI can be performed for further evaluation of these findings.                MACRO:    None          Signed by: Christiano River 6/28/2025 10:05 PM    Dictation workstation:   [id number]                   Patient History   Medical History[1]  Surgical History[2]  Family History[3]  Social History[4]    Physical Exam   ED Triage Vitals [06/28/25 1956]   Temperature Heart Rate Respirations BP   36.7 °C (98.1 °F) 72 18 146/90      Pulse Ox Temp Source Heart Rate Source Patient Position   98 % Oral Monitor Sitting      BP Location FiO2 (%)     Right arm --       Physical Exam      ED Course & MDM   Diagnoses as of 06/29/25 0713   Diabetic foot infection (Multi)   'Light-for-dates' infant with signs of fetal malnutrition ([facility])                 No data recorded     [place] Coma Scale Score: 15 (06/28/25 2119 : Chiquis Roper RN)                           Medical Decision Making      Procedure  Procedures       [1]   Past Medical History:  Diagnosis Date    Abnormal finding of blood chemistry, unspecified 06/21/2017    Abnormal blood chemistry    Acute pharyngitis, unspecified 11/11/2019    Pharyngitis    Acute pharyngitis, unspecified 08/18/2020    Sore throat    Anosmia 04/19/2021    Anosmia    Calculus of kidney 01/04/2021    Kidney stone on right side    Cellulitis, unspecified 12/21/2022    Cellulitis    Chronic sinusitis,  unspecified 04/26/2018    Sinusitis    Cough, unspecified 04/19/2021    Cough    Disorder of kidney and ureter, unspecified 11/14/2022    Renal insufficiency    Dorsalgia, unspecified 12/22/2020    Back pain    Edema, unspecified 11/16/2022    Edema    Encounter for screening for malignant neoplasm of colon 02/11/2020    Colon cancer screening    Encounter for screening for malignant neoplasm of prostate 01/19/2022    Screening for prostate cancer    Encounter for screening for malignant neoplasm of prostate 05/19/2020    Encounter for prostate cancer screening    Essential (primary) hypertension 11/16/2022    Benign essential hypertension    Fever, unspecified 04/19/2021    Fever    Follicular cyst of the skin and subcutaneous tissue, unspecified 11/11/2019    Infected cyst of skin    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    Low HDL (under 40)    Liver disease, unspecified 01/27/2021    Liver lesion    Male erectile dysfunction, unspecified 02/07/2020    Erectile dysfunction    Myalgia, unspecified site 08/18/2020    Myalgia    Other chest pain 01/19/2022    Chest discomfort    Other conditions influencing health status 01/19/2022    Skin tear    Other fatigue 03/21/2019    Fatigue    Other malaise 08/18/2020    Malaise and fatigue    Other nonspecific abnormal finding of lung field 02/10/2021    Abnormal CT scan, lung    Pain in right knee 06/26/2020    Right knee pain    Pain in right shoulder 03/30/2022    Right shoulder pain    Proteinuria, unspecified 11/14/2022    Proteinuria    Proteinuria, unspecified 11/13/2015    Proteinuria    Type 2 diabetes mellitus without complications 12/22/2022    Diabetes mellitus    Unspecified abdominal pain 05/26/2022    Abdominal pain    Unspecified open wound of unspecified toe(s) without damage to nail, initial encounter 03/16/2020    Wound, open, toe   [2]   Past Surgical History:  Procedure Laterality Date    AMPUTATION Left     left 2nd toe    HAND SURGERY  04/04/2014    Hand  Surgery                                                                                                                                                          OTHER SURGICAL HISTORY  06/17/2020    Colonoscopy   [3]   Family History  Problem Relation Name Age of Onset    Kidney cancer Mother      Dementia Father      No Known Problems Daughter      No Known Problems Daughter     [4]   Social History  Tobacco Use    Smoking status: Former     Types: Cigarettes    Smokeless tobacco: Never   Vaping Use    Vaping status: Never Used   Substance Use Topics    Alcohol use: Not Currently    Drug use: Not Currently     Comment: for kristin Sun, DO  06/29/25 0715

## 2025-06-29 NOTE — CONSULTS
PODIATRIC MEDICINE & SURGERY - CONSULT NOTE    SERVICE DATE: 6/29/2025  SERVICE TIME: 12:00    HPI    Patient is a 59 y.o. male with PMHx of DM2 and HTN presents for cellulitis affecting the second right toe. He is followed by podiatry as an outpatient and has been taking doxycycline for the past 3 days. He denies fever and chills but has had significant worsening of his second toe swelling and erythema over the past day .     Podiatry consulted for worsening swelling of R toe. Patient was seen by Dr. Baig last Thursday. Was previously diagnosed with a Charcot neuroarthropathy flare and instructed to wear a CAM boot and be NWB when able to. States the toe was swollen, but had gotten worse over the last 24 hours. Patient was taking Abx without improvement. States the toe has been weeping as of 2 days ago. States the foot has been red for several days. Denies constitutional symptoms and has no other pedal complaints at this point.  Currently on vanc/zosyn.         Review of Systems   Constitutional: Negative.    HENT: Negative.     Eyes: Negative.    Respiratory: Negative.     Cardiovascular: Negative.    Gastrointestinal: Negative.    Genitourinary: Negative.    Musculoskeletal: Negative.    Skin:  Positive for rash and wound.   Allergic/Immunologic: Negative.    Neurological: Negative.    Psychiatric/Behavioral: Negative.           Medical History[1]      Problem List Items Addressed This Visit       * (Principal) 'Light-for-dates' infant with signs of fetal malnutrition ([facility]) - Primary     Other Visit Diagnoses         Diabetic foot infection (Multi)                  Medications and Allergies reviewed.      PHYSICAL EXAM    Vitals:    06/29/25 0749   BP: (!) 157/91   Pulse: 65   Resp: 18   Temp: 37 °C (98.6 °F)   SpO2: 99%       General: AOx4. NAD. Pleasant. Cooperative.    Vasc:   DP and PT pulses are plapble b/l. CFT < 3 seconds to hallux b/l. Skin temperature is warm to warm from proximal to distal  b/l. No edema B/L LE.     Neuro:   Light touch absent feet b/l.     MSK:   Strength 5/5 for all pedal groups b/l. Ankle, STJ ROM intact to age and mobility status.     Derm:   Nails thickened. Erythema noted to R 2nd toe and forefoot. Skin sloughing noted to dorsal R 2nd toe, toe is diffusely erythematous and edematous, without increased warmth. Slight serous drainage, no open wound to toe. No malodor, palpable fluctuance.  No ascending cellulitis or lymphangitis. No evidence of tissue necrosis.          RESULTS    CBC  Lab Results   Component Value Date    WBC 10.4 06/28/2025    HGB 13.5 06/28/2025    HCT 43.1 06/28/2025    MCV 84 06/28/2025     06/28/2025       ESR  Lab Results   Component Value Date    SEDRATE 62 (H) 06/28/2025       CRP  Lab Results   Component Value Date    CRP 9.14 (H) 06/28/2025       HgbA1c  Lab Results   Component Value Date    HGBA1C 6.9 (A) 06/10/2025         IMAGING REVIEW:    XRAY  foot, 3 views: 6/28/2025  Severe destructive arthritis in the 2nd MTP joint with erosive  changes and osseous destruction of the 2nd proximal phalangeal base  of the 2nd metatarsal head consistent with septic arthritis and  osteomyelitis. Septic arthritis also present in the 1st MTP joint  with widening and subluxation of the joint along with erosive changes  of 1st metatarsal head. Findings concerning for osteomyelitis in the  3rd distal metatarsal joint as well versus remote fracture deformity.  Soft tissue edema about the forefoot predominately in the 2nd toe.  MRI can be performed for further evaluation of these findings.        ASSESSMENT  #cellulitis, R foot   #worsening charcot neuroarthropathy, R foot  #type 2 diabetes mellitus       PLAN  - Charts and results reviewed. A comprehensive history was taken and physical exam was performed.   - Educated on findings, diagnosis, treatment plans.  - CRP: 9.14  - ESR: 62  - WBC: 10.4  - Blood culture: pending  - Xray: Osseous destruction of 2nd PIPJ and  met, charcot vs septic arthritis vs OM. Based on clinical presentation and patient history, more likely to be experiencing Charcot flare. Awaiting MRI.  - Continue IV abx per primary.   - Dressing: betadine, 4x4 gauze, Kerlix,  - Wound care orders entered. Nursing staff may change or reinforce dressing as needed.   - WB status: limited WB is possible. Patient has CAM boot at home he used.   - No surgical intervention at this time.  Based on PMHx, clinical presentation and lab work, patient is likely experiencing worsening Charcot neuroarthropathy flare vs OM vs septic arthritis. Was documented having an active Charcot flare on 6/2 and WBC 6.2 indicating Charcot flare more likely. Awaiting MRI.   - Podiatry will continue following.    There was a transition of care. Please EPIC Secure lucero Harris DPM PGY-3 with questions about patient care as of 6/30.     This patient was discussed and evaluated with Dr. Celeste Oh DPM.    This is a preliminary note with attending attestation to follow.     Celeste Cooper DPM PGY-2  Podiatric Medicine & Surgery    i-nexus preferred         [1]   Past Medical History:  Diagnosis Date    Abnormal finding of blood chemistry, unspecified 06/21/2017    Abnormal blood chemistry    Acute pharyngitis, unspecified 11/11/2019    Pharyngitis    Acute pharyngitis, unspecified 08/18/2020    Sore throat    Anosmia 04/19/2021    Anosmia    Calculus of kidney 01/04/2021    Kidney stone on right side    Cellulitis, unspecified 12/21/2022    Cellulitis    Chronic sinusitis, unspecified 04/26/2018    Sinusitis    Cough, unspecified 04/19/2021    Cough    Disorder of kidney and ureter, unspecified 11/14/2022    Renal insufficiency    Dorsalgia, unspecified 12/22/2020    Back pain    Edema, unspecified 11/16/2022    Edema    Encounter for screening for malignant neoplasm of colon 02/11/2020    Colon cancer screening    Encounter for screening for malignant neoplasm of prostate  01/19/2022    Screening for prostate cancer    Encounter for screening for malignant neoplasm of prostate 05/19/2020    Encounter for prostate cancer screening    Essential (primary) hypertension 11/16/2022    Benign essential hypertension    Fever, unspecified 04/19/2021    Fever    Follicular cyst of the skin and subcutaneous tissue, unspecified 11/11/2019    Infected cyst of skin    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    Low HDL (under 40)    Liver disease, unspecified 01/27/2021    Liver lesion    Male erectile dysfunction, unspecified 02/07/2020    Erectile dysfunction    Myalgia, unspecified site 08/18/2020    Myalgia    Other chest pain 01/19/2022    Chest discomfort    Other conditions influencing health status 01/19/2022    Skin tear    Other fatigue 03/21/2019    Fatigue    Other malaise 08/18/2020    Malaise and fatigue    Other nonspecific abnormal finding of lung field 02/10/2021    Abnormal CT scan, lung    Pain in right knee 06/26/2020    Right knee pain    Pain in right shoulder 03/30/2022    Right shoulder pain    Proteinuria, unspecified 11/14/2022    Proteinuria    Proteinuria, unspecified 11/13/2015    Proteinuria    Type 2 diabetes mellitus without complications 12/22/2022    Diabetes mellitus    Unspecified abdominal pain 05/26/2022    Abdominal pain    Unspecified open wound of unspecified toe(s) without damage to nail, initial encounter 03/16/2020    Wound, open, toe

## 2025-06-29 NOTE — PROGRESS NOTES
Vancomycin Dosing by Pharmacy- INITIAL    Franck Mckinley is a 59 y.o. year old male who Pharmacy has been consulted for vancomycin dosing for cellulitis, skin and soft tissue. Based on the patient's indication and renal status this patient will be dosed based on a goal AUC of 400-600.     Renal function is currently stable.    Visit Vitals  /84   Pulse 72   Temp 36.7 °C (98.1 °F) (Oral)   Resp 16        Lab Results   Component Value Date    CREATININE 1.21 2025    CREATININE 1.18 2025    CREATININE 1.06 01/10/2025    CREATININE CANCELED 2023    CREATININE 0.85 2023    CREATININE 0.83 2023    CREATININE 0.84 2023        Patient weight is as follows:   Vitals:    25   Weight: (!) 152 kg (335 lb)       Cultures:  No results found for the encounter in last 14 days.        No intake/output data recorded.  I/O during current shift:  No intake/output data recorded.    Temp (24hrs), Av.7 °C (98.1 °F), Min:36.7 °C (98.1 °F), Max:36.7 °C (98.1 °F)         Assessment/Plan     Patient has already been given a loading dose of 2000 mg.  Will initiate vancomycin maintenance, 1250 mg every 12 hours.    This dosing regimen is predicted by [employer] to result in the following pharmacokinetic parameters:  Exposure target: AUC24 (range) 400-600 mg/L.hr   [id number]: 418 mg/L.hr  AUC24,ss: 483 mg/L.hr  Probability of AUC24 > 400: 70 %  Ctrough,ss: 16.4 mg/L  Probability of Ctrough,ss > 20: 33 %    Follow-up level will be ordered on  at 0500 unless clinically indicated sooner.  Will continue to monitor renal function daily while on vancomycin and order serum creatinine at least every 48 hours if not already ordered.  Follow for continued vancomycin needs, clinical response, and signs/symptoms of toxicity.       Yelitza Baum, PharmD

## 2025-06-29 NOTE — CARE PLAN
The patient's goals for the shift include      The clinical goals for the shift include monitor for s/s of infection      Problem: Safety - Adult  Goal: Free from fall injury  Outcome: Progressing     Problem: Pain - Adult  Goal: Verbalizes/displays adequate comfort level or baseline comfort level  Outcome: Progressing

## 2025-06-29 NOTE — H&P
Franck Mckinley is a 59 y.o. male   Toe Pain        Patient with a past medical history of pretension dyslipidemia type 2 diabetes mellitus insulin-dependent morbid obesity previous history of osteomyelitis of the foot status post amputation presents with increasing swelling redness of the right foot over the last few days  Patient had seen his podiatrist recently and was prescribed doxycycline and was advised that if symptoms got worse he should come to the ER and asked what he did  Denies any increased pain because of foot feels mostly numb  No nausea vomiting or diarrhea    Past Medical History  Medical History[1]    Surgical History  Surgical History[2]     Social History  He reports that he has quit smoking. His smoking use included cigarettes. He has never used smokeless tobacco. He reports that he does not currently use alcohol. He reports that he does not currently use drugs.    Family History  Family History[3]     Allergies  Chocolate    Review of Systems     Constitutional: not feeling poorly, no fever, no recent weight gain and no recent weight loss.   Eyes: no blurred vision and no diplopia.   ENT: no hearing loss, no tinnitus, no earache, no sore throat, no hoarseness and no swollen glands in the neck.   Cardiovascular: no chest pain, no tightness or heavy pressure, no shortness of breath, no palpitations and no lower extremity edema.   Respiratory: no cough, wheezing or shortness of breath at rest or exertion  Gastrointestinal: no change in bowel habits, no diarrhea, no constipation, no bloody stools, no nausea, no vomiting, no abdominal pain, no signs and symptoms of ulcer disease, no zaida colored stools and no intolerance to fatty foods.   Genitourinary: no urinary frequency, no dysuria, no hematuria, no burning sensation during urination, urinary stream is not smaller and urinary stream does not start and stop.   Musculoskeletal: no arthralgias, no joint stiffness, no muscle weakness, no back  pain and no difficulty walking.   Skin: Changes in lower extremity  Neurological: no headaches, no dizziness, no seizures, no tingling, no numbness, no signs and symptoms of stroke and no limb weakness.   Psychiatric: no confusion, no memory lapses or loss, no depression and no sleep disturbances.   Endocrine: no excessive thirst, no dry skin, no cold intolerance, no heat intolerance and no increased urinary frequency.   Hematologic/Lymphatic: is not slow to heal, does not bleed easily, does not bruise easily, no thrombophlebitis, no anemia and no history of blood transfusion.   All other systems have been reviewed and are negative for complaint.     Vitals:    06/29/25 0749   BP: (!) 157/91   Pulse: 65   Resp: 18   Temp: 37 °C (98.6 °F)   SpO2: 99%        Scheduled medications  Scheduled Medications[4]  Continuous medications  Continuous Medications[5]  PRN medications  PRN Medications[6]    Results from last 7 days   Lab Units 06/28/25  2118   WBC AUTO x10*3/uL 10.4   HEMOGLOBIN g/dL 13.5   HEMATOCRIT % 43.1   PLATELETS AUTO x10*3/uL 266     Results from last 7 days   Lab Units 06/28/25  2118   SODIUM mmol/L 140   POTASSIUM mmol/L 3.9   CHLORIDE mmol/L 109*   CO2 mmol/L 24   BUN mg/dL 23   CREATININE mg/dL 1.21   CALCIUM mg/dL 8.8   GLUCOSE mg/dL 74            XR foot right 3+ views   Final Result   Severe destructive arthritis in the 2nd MTP joint with erosive   changes and osseous destruction of the 2nd proximal phalangeal base   of the 2nd metatarsal head consistent with septic arthritis and   osteomyelitis. Septic arthritis also present in the 1st MTP joint   with widening and subluxation of the joint along with erosive changes   of 1st metatarsal head. Findings concerning for osteomyelitis in the   3rd distal metatarsal joint as well versus remote fracture deformity.   Soft tissue edema about the forefoot predominately in the 2nd toe.   MRI can be performed for further evaluation of these findings.              MACRO:   None        Signed by: Christiano River 6/28/2025 10:05 PM   Dictation workstation:   [id number]          Physical Exam      Constitutional   General appearance: Alert and in no acute distress.   Eyes   Inspection of eyes: Sclera and conjunctiva were normal.    Pupil exam: Pupils were equal in size. Extraocular movements were intact.   Pulmonary   Respiratory assessment: No respiratory distress, normal respiratory rhythm and effort.    Auscultation of Lungs: Clear bilateral breath sounds.   Cardiovascular   Auscultation of heart: Apical pulse normal, heart rate and rhythm normal, normal S1 and S2, no murmurs and no pericardial rub.    Exam for edema: Trace edema  Abdomen   Abdominal Exam: No bruits, normal bowel sounds, soft, non-tender, no abdominal mass palpated.    Liver and Spleen exam: No hepato-splenomegaly.   Musculoskeletal   Swollen erythematous second toe of the right foot along with surrounding erythema  Skin   Skin inspection: Stasis dermatitis  Cranial nerves: Nerves 2-12 were intact, no focal neuro defects.   Psychiatric   Orientation: Oriented to person, place, and time.    Mood and affect: Normal.      Assessment/Plan      #Osteomyelitis of second right toe  #Cellulitis  Starting IV antibiotics  Consulted infectious disease and podiatry  Will likely need the toe amputated    #Diabetes mellitus  Resume home medications and monitor    #Hypertension  Continue home medications    #Dyslipidemia  Continue statins    DVT prophylaxis       [1]   Past Medical History:  Diagnosis Date    Abnormal finding of blood chemistry, unspecified 06/21/2017    Abnormal blood chemistry    Acute pharyngitis, unspecified 11/11/2019    Pharyngitis    Acute pharyngitis, unspecified 08/18/2020    Sore throat    Anosmia 04/19/2021    Anosmia    Calculus of kidney 01/04/2021    Kidney stone on right side    Cellulitis, unspecified 12/21/2022    Cellulitis    Chronic sinusitis, unspecified 04/26/2018    Sinusitis     Cough, unspecified 04/19/2021    Cough    Disorder of kidney and ureter, unspecified 11/14/2022    Renal insufficiency    Dorsalgia, unspecified 12/22/2020    Back pain    Edema, unspecified 11/16/2022    Edema    Encounter for screening for malignant neoplasm of colon 02/11/2020    Colon cancer screening    Encounter for screening for malignant neoplasm of prostate 01/19/2022    Screening for prostate cancer    Encounter for screening for malignant neoplasm of prostate 05/19/2020    Encounter for prostate cancer screening    Essential (primary) hypertension 11/16/2022    Benign essential hypertension    Fever, unspecified 04/19/2021    Fever    Follicular cyst of the skin and subcutaneous tissue, unspecified 11/11/2019    Infected cyst of skin    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    Low HDL (under 40)    Liver disease, unspecified 01/27/2021    Liver lesion    Male erectile dysfunction, unspecified 02/07/2020    Erectile dysfunction    Myalgia, unspecified site 08/18/2020    Myalgia    Other chest pain 01/19/2022    Chest discomfort    Other conditions influencing health status 01/19/2022    Skin tear    Other fatigue 03/21/2019    Fatigue    Other malaise 08/18/2020    Malaise and fatigue    Other nonspecific abnormal finding of lung field 02/10/2021    Abnormal CT scan, lung    Pain in right knee 06/26/2020    Right knee pain    Pain in right shoulder 03/30/2022    Right shoulder pain    Proteinuria, unspecified 11/14/2022    Proteinuria    Proteinuria, unspecified 11/13/2015    Proteinuria    Type 2 diabetes mellitus without complications 12/22/2022    Diabetes mellitus    Unspecified abdominal pain 05/26/2022    Abdominal pain    Unspecified open wound of unspecified toe(s) without damage to nail, initial encounter 03/16/2020    Wound, open, toe   [2]   Past Surgical History:  Procedure Laterality Date    AMPUTATION Left     left 2nd toe    HAND SURGERY  04/04/2014    Hand Surgery                                                                                                                                                           OTHER SURGICAL HISTORY  06/17/2020    Colonoscopy   [3]   Family History  Problem Relation Name Age of Onset    Kidney cancer Mother      Dementia Father      No Known Problems Daughter      No Known Problems Daughter     [4] amLODIPine, 10 mg, oral, Daily  docusate sodium, 100 mg, oral, BID  empagliflozin, 25 mg, oral, Daily  enoxaparin, 40 mg, subcutaneous, q24h  fenofibrate, 54 mg, oral, Daily  insulin glargine, 43 Units, subcutaneous, q24h  insulin lispro, 0-5 Units, subcutaneous, Before meals & nightly  losartan, 100 mg, oral, Daily  pantoprazole, 40 mg, oral, Daily before breakfast  piperacillin-tazobactam, 3.375 g, intravenous, q6h  pravastatin, 20 mg, oral, Nightly  [START ON 6/30/2025] vancomycin, 1,250 mg, intravenous, q12h    [5]    [6] PRN medications: acetaminophen, melatonin, ondansetron, traMADol, vancomycin

## 2025-06-29 NOTE — CARE PLAN
The clinical goals for the shift include monitor for s/s of infection    Over the shift, the patient did make progress toward the following goals. Barriers to progression include       Problem: Pain - Adult  Goal: Verbalizes/displays adequate comfort level or baseline comfort level  Outcome: Progressing  Flowsheets (Taken 6/29/2025 1146)  Verbalizes/displays adequate comfort level or baseline comfort level:   Encourage patient to monitor pain and request assistance   Assess pain using appropriate pain scale   Administer analgesics based on type and severity of pain and evaluate response   Consider cultural and social influences on pain and pain management   Implement non-pharmacological measures as appropriate and evaluate response   Notify Licensed Independent Practitioner if interventions unsuccessful or patient reports new pain     Problem: Safety - Adult  Goal: Free from fall injury  Outcome: Progressing  Flowsheets (Taken 6/29/2025 1146)  Free from fall injury: Instruct family/caregiver on patient safety     Problem: Discharge Planning  Goal: Discharge to home or other facility with appropriate resources  Outcome: Progressing  Flowsheets (Taken 6/29/2025 1146)  Discharge to home or other facility with appropriate resources:   Identify barriers to discharge with patient and caregiver   Arrange for needed discharge resources and transportation as appropriate   Identify discharge learning needs (meds, wound care, etc)   Arrange for interpreters to assist at discharge as needed   Refer to discharge planning if patient needs post-hospital services based on physician order or complex needs related to functional status, cognitive ability or social support system     Problem: Chronic Conditions and Co-morbidities  Goal: Patient's chronic conditions and co-morbidity symptoms are monitored and maintained or improved  Outcome: Progressing  Flowsheets (Taken 6/29/2025 1146)  Care Plan - Patient's Chronic Conditions and  Co-Morbidity Symptoms are Monitored and Maintained or Improved:   Monitor and assess patient's chronic conditions and comorbid symptoms for stability, deterioration, or improvement   Collaborate with multidisciplinary team to address chronic and comorbid conditions and prevent exacerbation or deterioration   Update acute care plan with appropriate goals if chronic or comorbid symptoms are exacerbated and prevent overall improvement and discharge     Problem: Nutrition  Goal: Nutrient intake appropriate for maintaining nutritional needs  Outcome: Progressing

## 2025-06-30 LAB
ANION GAP SERPL CALC-SCNC: 15 MMOL/L (ref 10–20)
BUN SERPL-MCNC: 22 MG/DL (ref 6–23)
CALCIUM SERPL-MCNC: 8.8 MG/DL (ref 8.6–10.3)
CHLORIDE SERPL-SCNC: 107 MMOL/L (ref 98–107)
CO2 SERPL-SCNC: 23 MMOL/L (ref 21–32)
CREAT SERPL-MCNC: 1.1 MG/DL (ref 0.5–1.3)
EGFRCR SERPLBLD CKD-EPI 2021: 77 ML/MIN/1.73M*2
ERYTHROCYTE [DISTWIDTH] IN BLOOD BY AUTOMATED COUNT: 13.9 % (ref 11.5–14.5)
EST. AVERAGE GLUCOSE BLD GHB EST-MCNC: 137 MG/DL
GLUCOSE BLD MANUAL STRIP-MCNC: 101 MG/DL (ref 74–99)
GLUCOSE BLD MANUAL STRIP-MCNC: 132 MG/DL (ref 74–99)
GLUCOSE SERPL-MCNC: 83 MG/DL (ref 74–99)
HBA1C MFR BLD: 6.4 % (ref ?–5.7)
HCT VFR BLD AUTO: 44.8 % (ref 41–52)
HGB BLD-MCNC: 14.5 G/DL (ref 13.5–17.5)
MCH RBC QN AUTO: 26.1 PG (ref 26–34)
MCHC RBC AUTO-ENTMCNC: 32.4 G/DL (ref 32–36)
MCV RBC AUTO: 81 FL (ref 80–100)
NRBC BLD-RTO: 0 /100 WBCS (ref 0–0)
PLATELET # BLD AUTO: 249 X10*3/UL (ref 150–450)
POTASSIUM SERPL-SCNC: 3.9 MMOL/L (ref 3.5–5.3)
RBC # BLD AUTO: 5.56 X10*6/UL (ref 4.5–5.9)
SODIUM SERPL-SCNC: 141 MMOL/L (ref 136–145)
VANCOMYCIN SERPL-MCNC: 2.8 UG/ML (ref 5–20)
WBC # BLD AUTO: 8.6 X10*3/UL (ref 4.4–11.3)

## 2025-06-30 PROCEDURE — 80048 BASIC METABOLIC PNL TOTAL CA: CPT | Performed by: PHARMACIST

## 2025-06-30 PROCEDURE — 1100000001 HC PRIVATE ROOM DAILY

## 2025-06-30 PROCEDURE — 2500000001 HC RX 250 WO HCPCS SELF ADMINISTERED DRUGS (ALT 637 FOR MEDICARE OP): Performed by: INTERNAL MEDICINE

## 2025-06-30 PROCEDURE — 80202 ASSAY OF VANCOMYCIN: CPT | Performed by: PHARMACIST

## 2025-06-30 PROCEDURE — 82947 ASSAY GLUCOSE BLOOD QUANT: CPT

## 2025-06-30 PROCEDURE — 2500000004 HC RX 250 GENERAL PHARMACY W/ HCPCS (ALT 636 FOR OP/ED): Performed by: PHARMACIST

## 2025-06-30 PROCEDURE — 99232 SBSQ HOSP IP/OBS MODERATE 35: CPT | Performed by: INTERNAL MEDICINE

## 2025-06-30 PROCEDURE — 85027 COMPLETE CBC AUTOMATED: CPT | Performed by: INTERNAL MEDICINE

## 2025-06-30 PROCEDURE — 36415 COLL VENOUS BLD VENIPUNCTURE: CPT | Performed by: PHARMACIST

## 2025-06-30 RX ADMIN — PIPERACILLIN SODIUM AND TAZOBACTAM SODIUM 3.38 G: 3; .375 INJECTION, SOLUTION INTRAVENOUS at 15:07

## 2025-06-30 RX ADMIN — AMLODIPINE BESYLATE 10 MG: 10 TABLET ORAL at 09:13

## 2025-06-30 RX ADMIN — PRAVASTATIN SODIUM 20 MG: 20 TABLET ORAL at 20:07

## 2025-06-30 RX ADMIN — VANCOMYCIN HYDROCHLORIDE 1250 MG: 1.25 INJECTION, POWDER, LYOPHILIZED, FOR SOLUTION INTRAVENOUS at 12:05

## 2025-06-30 RX ADMIN — FENOFIBRATE 54 MG: 54 TABLET ORAL at 09:13

## 2025-06-30 RX ADMIN — PIPERACILLIN SODIUM AND TAZOBACTAM SODIUM 3.38 G: 3; .375 INJECTION, SOLUTION INTRAVENOUS at 02:59

## 2025-06-30 RX ADMIN — PIPERACILLIN SODIUM AND TAZOBACTAM SODIUM 3.38 G: 3; .375 INJECTION, SOLUTION INTRAVENOUS at 20:11

## 2025-06-30 RX ADMIN — VANCOMYCIN HYDROCHLORIDE 1250 MG: 1.25 INJECTION, POWDER, LYOPHILIZED, FOR SOLUTION INTRAVENOUS at 22:07

## 2025-06-30 RX ADMIN — LOSARTAN POTASSIUM 100 MG: 50 TABLET, FILM COATED ORAL at 09:13

## 2025-06-30 RX ADMIN — EMPAGLIFLOZIN 25 MG: 10 TABLET, FILM COATED ORAL at 09:13

## 2025-06-30 RX ADMIN — PIPERACILLIN SODIUM AND TAZOBACTAM SODIUM 3.38 G: 3; .375 INJECTION, SOLUTION INTRAVENOUS at 09:14

## 2025-06-30 ASSESSMENT — COGNITIVE AND FUNCTIONAL STATUS - GENERAL
DAILY ACTIVITIY SCORE: 24
MOBILITY SCORE: 24
DAILY ACTIVITIY SCORE: 24
MOBILITY SCORE: 24

## 2025-06-30 ASSESSMENT — PAIN - FUNCTIONAL ASSESSMENT
PAIN_FUNCTIONAL_ASSESSMENT: 0-10
PAIN_FUNCTIONAL_ASSESSMENT: 0-10

## 2025-06-30 ASSESSMENT — ACTIVITIES OF DAILY LIVING (ADL): LACK_OF_TRANSPORTATION: NO

## 2025-06-30 ASSESSMENT — PAIN SCALES - GENERAL
PAINLEVEL_OUTOF10: 1
PAINLEVEL_OUTOF10: 0 - NO PAIN

## 2025-06-30 NOTE — PROGRESS NOTES
"PODIATRIC MEDICINE & SURGERY - PROGRESS NOTE    Subjective   Franck Mckinley is a 59 y.o. male who is on day 2 of admission for 'Light-for-dates' infant with signs of fetal malnutrition ([facility]).      Interval HPI: Patient resting comfortably in bed. Reports pain is well-controlled. Denies current constitutional sx. No further complaints.     Overnight events: None    REVIEW OF SYSTEMS  A 10+ point ROS was completed and is negative unless as otherwise noted.    Past Medical History  Medical History[1]  Allergies  RX Allergies[2]  Medications  Scheduled Medications[3]  Continuous Medications[4]  PRN Medications[5]    Objective   Visit Vitals  /78 (BP Location: Left arm, Patient Position: Lying)   Pulse 63   Temp 37 °C (98.6 °F) (Temporal)   Resp 21   Ht (!) 2.083 m (6' 10\")   Wt (!) 152 kg (335 lb)   SpO2 97%   BMI 35.03 kg/m²   Smoking Status Former   BSA 2.97 m²       PHYSICAL EXAM  Constitutional: NAD and AAOx3.   HEENT: Head is normocephalic and atraumatic. External ear is normal B/L. Conjunctivae normal B/L. Nose is normal. Oropharynx is clear. Mouth is moist.   Cardiovascular: Normal rate.  Pulmonary: No increased work of breathing.  Psychological: Appropriate mood and behavior.    Lower Extremity Exam  Vascular: DP and PT pulses are palpable bilateral.  Capillary fill time is less than 3 seconds to the toes bilateral.  Skin temperature is warm proximal to distal lower extremity bilateral. Gross erythema and edema to the right 2nd toe.     Neurologic:  Light touch is intact to the foot bilateral. Protective sensation is absent to the foot bilateral.     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 Hallux malleus bilateral. Hallux varus, right. Rigid hammer toes 3-5 B/L. Prominent 2nd and 3rd metatarsal heads, right plantar mass from charcot. Ankle joint ROM is decreased with dorsiflexion B/L.    Dermatologic: The pedal interdigital spaces are clean and dry. Gross " erythema and edema of the right 2nd toe. Wound as described below    WOUND #1: Right 2nd toe  Wound measuring 2.5 cm x 1.2 x 0.1 cm at the dorsal aspect of the right 2nd toe. Does not probe to bone. Cellulitis extends to the base of the digit. Serosanguinous drainage with no malodor. The wound does not probe to bone.         Lab Results   Component Value Date    WBC 8.6 06/30/2025    HGB 14.5 06/30/2025    HCT 44.8 06/30/2025    MCV 81 06/30/2025     06/30/2025     Lab Results   Component Value Date    GLUCOSE 83 06/30/2025    CALCIUM 8.8 06/30/2025     06/30/2025    K 3.9 06/30/2025    CO2 23 06/30/2025     06/30/2025    BUN 22 06/30/2025    CREATININE 1.10 06/30/2025     Lab Results   Component Value Date    HGBA1C 6.4 (H) 06/28/2025     No results found for the last 90 days.    Imaging  XR foot right 3+ views  Result Date: 6/28/2025  Severe destructive arthritis in the 2nd MTP joint with erosive changes and osseous destruction of the 2nd proximal phalangeal base of the 2nd metatarsal head consistent with septic arthritis and osteomyelitis. Septic arthritis also present in the 1st MTP joint with widening and subluxation of the joint along with erosive changes of 1st metatarsal head. Findings concerning for osteomyelitis in the 3rd distal metatarsal joint as well versus remote fracture deformity. Soft tissue edema about the forefoot predominately in the 2nd toe. MRI can be performed for further evaluation of these findings.     MACRO: None   Signed by: Christiano River 6/28/2025 10:05 PM Dictation workstation:   [id number]    Cardiology, Vascular, and Other Imaging  No other imaging results found for the past 7 days    Assessment/Plan   Franck Mckinley is a 59 y.o. male who is on day 2 of admission for cellulitis of right foot.     #Chronic non-pressure ulceration of right toe with skin layer exposed - R 2nd toe  #Cellulitis, right foot  #T2DM with peripheral neuropathy  #Charcot  neuroarthropathy of right forefoot    Reviewed interval vitals, labs, imaging and notes  Reviewed XR right foot. Radiographic findings are consistent with Charcot neuroarthropathy at the 2nd and 3rd metatarsal phalangeal joints.    Patient with Charcot neuroarthropathy of the right foot, in addition to cellulitis of the right 2nd toe. Low suspicion for osteomyelitis of metatarsals at this time. No need for MRI at this time, as MRI cannot differentiate OM vs Charcot.     Discussed surgical and conservative treatment options, including amputation of the right 2nd toe vs right transmetatarsal amputation due to destruction of metatarsals from charcot. Discussed risks, benefits and alternatives to surgical intervention. Given current active Charcot episode, plan for outpatient surgical intervention after episode resolves. If we were to do any surgery at the moment it could worsen the charcot attack and cause bone loss of his metatarsals, leading to needing a tma. He is not interested in a TMA at the moment and is very emotional about it and wishes to continue care outpatient knowing that a TMA may be needed    Consider IV abx at discharge. Recommend consult to infectious disease.     Heel weight bearing as tolerated  Dressing: betadine soaked gauze to right 2nd toe, 4x4 gauze, kerlix, ACE    Will continue to follow.     Plan discussed with attending physician Dr. Yajaira Rodriguez DPM.    Erica Harris DPM PGY-3  Podiatric Medicine & Surgery      I saw and evaluated the patient. I personally obtained the key and critical portions of the history and physical exam or was physically present for key and critical portions performed by the resident/fellow. I reviewed the resident/fellow's documentation and discussed the patient with the resident/fellow. I agree with the resident/fellow's medical decision making as documented in the note.    I spent 55 minutes in the professional and overall care of this patient.    Yajaira  RAMON Rodriguez        [1]   Past Medical History:  Diagnosis Date    Abnormal finding of blood chemistry, unspecified 06/21/2017    Abnormal blood chemistry    Acute pharyngitis, unspecified 11/11/2019    Pharyngitis    Acute pharyngitis, unspecified 08/18/2020    Sore throat    Anosmia 04/19/2021    Anosmia    Calculus of kidney 01/04/2021    Kidney stone on right side    Cellulitis, unspecified 12/21/2022    Cellulitis    Chronic sinusitis, unspecified 04/26/2018    Sinusitis    Cough, unspecified 04/19/2021    Cough    Disorder of kidney and ureter, unspecified 11/14/2022    Renal insufficiency    Dorsalgia, unspecified 12/22/2020    Back pain    Edema, unspecified 11/16/2022    Edema    Encounter for screening for malignant neoplasm of colon 02/11/2020    Colon cancer screening    Encounter for screening for malignant neoplasm of prostate 01/19/2022    Screening for prostate cancer    Encounter for screening for malignant neoplasm of prostate 05/19/2020    Encounter for prostate cancer screening    Essential (primary) hypertension 11/16/2022    Benign essential hypertension    Fever, unspecified 04/19/2021    Fever    Follicular cyst of the skin and subcutaneous tissue, unspecified 11/11/2019    Infected cyst of skin    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    Low HDL (under 40)    Liver disease, unspecified 01/27/2021    Liver lesion    Male erectile dysfunction, unspecified 02/07/2020    Erectile dysfunction    Myalgia, unspecified site 08/18/2020    Myalgia    Other chest pain 01/19/2022    Chest discomfort    Other conditions influencing health status 01/19/2022    Skin tear    Other fatigue 03/21/2019    Fatigue    Other malaise 08/18/2020    Malaise and fatigue    Other nonspecific abnormal finding of lung field 02/10/2021    Abnormal CT scan, lung    Pain in right knee 06/26/2020    Right knee pain    Pain in right shoulder 03/30/2022    Right shoulder pain    Proteinuria, unspecified 11/14/2022    Proteinuria     Proteinuria, unspecified 11/13/2015    Proteinuria    Type 2 diabetes mellitus without complications 12/22/2022    Diabetes mellitus    Unspecified abdominal pain 05/26/2022    Abdominal pain    Unspecified open wound of unspecified toe(s) without damage to nail, initial encounter 03/16/2020    Wound, open, toe   [2]   Allergies  Allergen Reactions    Chocolate Unknown   [3] amLODIPine, 10 mg, oral, Daily  docusate sodium, 100 mg, oral, BID  empagliflozin, 25 mg, oral, Daily  enoxaparin, 40 mg, subcutaneous, q24h  fenofibrate, 54 mg, oral, Daily  insulin glargine, 43 Units, subcutaneous, q24h  insulin lispro, 0-5 Units, subcutaneous, Before meals & nightly  losartan, 100 mg, oral, Daily  pantoprazole, 40 mg, oral, Daily before breakfast  piperacillin-tazobactam, 3.375 g, intravenous, q6h  pravastatin, 20 mg, oral, Nightly  vancomycin, 1,250 mg, intravenous, q12h  [4]    [5] PRN medications: acetaminophen, melatonin, ondansetron, traMADol, vancomycin

## 2025-06-30 NOTE — PROGRESS NOTES
06/30/25 1017   Discharge Planning   Living Arrangements Spouse/significant other   Support Systems Spouse/significant other   Type of Residence Private residence   Number of Stairs to Enter Residence 0   Number of Stairs Within Residence 7   Expected Discharge Disposition Home   Does the patient need discharge transport arranged? No   Financial Resource Strain   How hard is it for you to pay for the very basics like food, housing, medical care, and heating? Not hard   Housing Stability   In the last 12 months, was there a time when you were not able to pay the mortgage or rent on time? N   At any time in the past 12 months, were you homeless or living in a shelter (including now)? N   Transportation Needs   In the past 12 months, has lack of transportation kept you from medical appointments or from getting medications? no   In the past 12 months, has lack of transportation kept you from meetings, work, or from getting things needed for daily living? No   Intensity of Service   Intensity of Service 0-30 min     [facility] spoke with pt on the phone. Explained my role as discharge planner. Winter with wife. Independent with ADLs and drives. Pt here toe pain. Podiatry following. MRI pend. Pt declines need for HHC. Wife will drive pt home when med ready for dc.      Diagnosis:    Plan:    Disposition:    Barrier:    ADOD:

## 2025-06-30 NOTE — CARE PLAN
The patient's goals for the shift include To get better    The clinical goals for the shift include monitor for s/s of infection    Over the shift, the patient did make progress toward the following goals. Barriers to progression include       Problem: Pain - Adult  Goal: Verbalizes/displays adequate comfort level or baseline comfort level  Outcome: Progressing  Flowsheets (Taken 6/30/2025 1300)  Verbalizes/displays adequate comfort level or baseline comfort level:   Encourage patient to monitor pain and request assistance   Assess pain using appropriate pain scale   Administer analgesics based on type and severity of pain and evaluate response   Implement non-pharmacological measures as appropriate and evaluate response   Consider cultural and social influences on pain and pain management   Notify Licensed Independent Practitioner if interventions unsuccessful or patient reports new pain     Problem: Safety - Adult  Goal: Free from fall injury  Outcome: Progressing  Flowsheets (Taken 6/30/2025 1300)  Free from fall injury: Instruct family/caregiver on patient safety     Problem: Discharge Planning  Goal: Discharge to home or other facility with appropriate resources  Outcome: Progressing  Flowsheets (Taken 6/30/2025 1300)  Discharge to home or other facility with appropriate resources:   Identify barriers to discharge with patient and caregiver   Arrange for needed discharge resources and transportation as appropriate   Identify discharge learning needs (meds, wound care, etc)   Arrange for interpreters to assist at discharge as needed   Refer to discharge planning if patient needs post-hospital services based on physician order or complex needs related to functional status, cognitive ability or social support system     Problem: Chronic Conditions and Co-morbidities  Goal: Patient's chronic conditions and co-morbidity symptoms are monitored and maintained or improved  Outcome: Progressing  Flowsheets (Taken  6/30/2025 1300)  Care Plan - Patient's Chronic Conditions and Co-Morbidity Symptoms are Monitored and Maintained or Improved:   Monitor and assess patient's chronic conditions and comorbid symptoms for stability, deterioration, or improvement   Collaborate with multidisciplinary team to address chronic and comorbid conditions and prevent exacerbation or deterioration   Update acute care plan with appropriate goals if chronic or comorbid symptoms are exacerbated and prevent overall improvement and discharge     Problem: Nutrition  Goal: Nutrient intake appropriate for maintaining nutritional needs  Outcome: Progressing

## 2025-06-30 NOTE — CARE PLAN
The patient's goals for the shift include To get better    The clinical goals for the shift include monitor for s/s of infection      Problem: Safety - Adult  Goal: Free from fall injury  Outcome: Progressing     Problem: Discharge Planning  Goal: Discharge to home or other facility with appropriate resources  Outcome: Progressing     Problem: Chronic Conditions and Co-morbidities  Goal: Patient's chronic conditions and co-morbidity symptoms are monitored and maintained or improved  Outcome: Progressing     Problem: Nutrition  Goal: Nutrient intake appropriate for maintaining nutritional needs  Outcome: Progressing

## 2025-06-30 NOTE — PROGRESS NOTES
Franck Mckinley is a 59 y.o. male     Awaiting MRI  Noted podiatry input    The toe is quite swollen red and if not infected now has a potential of getting infected very soon  Will defer to podiatry about management of the Charcot's toe    Review of Systems     Constitutional: no fever, no chills, not feeling poorly, not feeling tired   Cardiovascular: no chest pain   Respiratory: no cough, wheezing or shortness of breath a  Gastrointestinal: no abdominal pain, no constipation, no melena, no nausea, no diarrhea, no vomiting and no blood in stools.   Neurological: no headache,   All other systems have been reviewed and are negative for complaint.       Vitals:    06/30/25 0900   BP: 135/68   Pulse: 72   Resp: 21   Temp: 36 °C (96.8 °F)   SpO2: 94%        Scheduled medications  Scheduled Medications[1]  Continuous medications  Continuous Medications[2]  PRN medications  PRN Medications[3]    Lab Review   Results from last 7 days   Lab Units 06/30/25  0750 06/28/25  2118   WBC AUTO x10*3/uL 8.6 10.4   HEMOGLOBIN g/dL 14.5 13.5   HEMATOCRIT % 44.8 43.1   PLATELETS AUTO x10*3/uL 249 266     Results from last 7 days   Lab Units 06/30/25  0750 06/28/25  2118   SODIUM mmol/L 141 140   POTASSIUM mmol/L 3.9 3.9   CHLORIDE mmol/L 107 109*   CO2 mmol/L 23 24   BUN mg/dL 22 23   CREATININE mg/dL 1.10 1.21   CALCIUM mg/dL 8.8 8.8   GLUCOSE mg/dL 83 74            XR foot right 3+ views   Final Result   Severe destructive arthritis in the 2nd MTP joint with erosive   changes and osseous destruction of the 2nd proximal phalangeal base   of the 2nd metatarsal head consistent with septic arthritis and   osteomyelitis. Septic arthritis also present in the 1st MTP joint   with widening and subluxation of the joint along with erosive changes   of 1st metatarsal head. Findings concerning for osteomyelitis in the   3rd distal metatarsal joint as well versus remote fracture deformity.   Soft tissue edema about the forefoot predominately  in the 2nd toe.   MRI can be performed for further evaluation of these findings.             MACRO:   None        Signed by: Christiano River 6/28/2025 10:05 PM   Dictation workstation:   [id number]      MR foot right w and wo IV contrast    (Results Pending)         Physical Exam    Constitutional   General appearance: Alert and in no acute distress.   Pulmonary   Respiratory assessment: No respiratory distress, normal respiratory rhythm and effort.    Auscultation of Lungs: Clear bilateral breath sounds.   Cardiovascular   Auscultation of heart: Apical pulse normal, heart rate and rhythm normal, normal S1 and S2, no murmurs and no pericardial rub.    Exam for edema: No peripheral edema.   Abdomen   Abdominal Exam: No bruits, normal bowel sounds, soft, non-tender, no abdominal mass palpated.    Liver and Spleen exam: No hepato-splenomegaly.   Musculoskeletal   Examination of gait: Normal.    Inspection of digits and nails: No clubbing or cyanosis of the fingernails.    Swollen red sausage appearing toe  Neurologic   Cranial nerves: Nerves 2-12 were intact, no focal neuro defects.         Assessment/Plan      #Osteomyelitis of second right toe versus Charcot's toe  #Cellulitis  Continue IV antibiotics  Podiatry input noted  MRI pending     #Diabetes mellitus  Continue home meds     #Hypertension  Continue home medications     #Dyslipidemia  Continue statins     DVT prophylaxis       [1] amLODIPine, 10 mg, oral, Daily  docusate sodium, 100 mg, oral, BID  empagliflozin, 25 mg, oral, Daily  enoxaparin, 40 mg, subcutaneous, q24h  fenofibrate, 54 mg, oral, Daily  insulin glargine, 43 Units, subcutaneous, q24h  insulin lispro, 0-5 Units, subcutaneous, Before meals & nightly  losartan, 100 mg, oral, Daily  pantoprazole, 40 mg, oral, Daily before breakfast  piperacillin-tazobactam, 3.375 g, intravenous, q6h  pravastatin, 20 mg, oral, Nightly  vancomycin, 1,250 mg, intravenous, q12h    [2]    [3] PRN medications:  acetaminophen, melatonin, ondansetron, traMADol, vancomycin

## 2025-07-01 ENCOUNTER — DOCUMENTATION (OUTPATIENT)
Dept: HOME HEALTH SERVICES | Facility: HOME HEALTH | Age: 60
End: 2025-07-01
Payer: COMMERCIAL

## 2025-07-01 ENCOUNTER — TELEPHONE (OUTPATIENT)
Dept: PODIATRY | Facility: HOSPITAL | Age: 60
End: 2025-07-01

## 2025-07-01 ENCOUNTER — DOCUMENTATION (OUTPATIENT)
Dept: PODIATRY | Facility: HOSPITAL | Age: 60
End: 2025-07-01
Payer: COMMERCIAL

## 2025-07-01 ENCOUNTER — PHARMACY VISIT (OUTPATIENT)
Dept: PHARMACY | Facility: CLINIC | Age: 60
End: 2025-07-01
Payer: MEDICARE

## 2025-07-01 VITALS
DIASTOLIC BLOOD PRESSURE: 89 MMHG | BODY MASS INDEX: 36.45 KG/M2 | RESPIRATION RATE: 16 BRPM | WEIGHT: 315 LBS | SYSTOLIC BLOOD PRESSURE: 151 MMHG | HEART RATE: 70 BPM | TEMPERATURE: 98.6 F | OXYGEN SATURATION: 98 % | HEIGHT: 78 IN

## 2025-07-01 PROBLEM — M14.60 CHARCOT ARTHROPATHY: Status: ACTIVE | Noted: 2025-07-01

## 2025-07-01 LAB
ANION GAP SERPL CALC-SCNC: 15 MMOL/L (ref 10–20)
BUN SERPL-MCNC: 17 MG/DL (ref 6–23)
CALCIUM SERPL-MCNC: 8.5 MG/DL (ref 8.6–10.3)
CHLORIDE SERPL-SCNC: 109 MMOL/L (ref 98–107)
CO2 SERPL-SCNC: 21 MMOL/L (ref 21–32)
CREAT SERPL-MCNC: 1.06 MG/DL (ref 0.5–1.3)
EGFRCR SERPLBLD CKD-EPI 2021: 81 ML/MIN/1.73M*2
ERYTHROCYTE [DISTWIDTH] IN BLOOD BY AUTOMATED COUNT: 13.8 % (ref 11.5–14.5)
GLUCOSE BLD MANUAL STRIP-MCNC: 157 MG/DL (ref 74–99)
GLUCOSE BLD MANUAL STRIP-MCNC: 83 MG/DL (ref 74–99)
GLUCOSE SERPL-MCNC: 75 MG/DL (ref 74–99)
HCT VFR BLD AUTO: 45.9 % (ref 41–52)
HGB BLD-MCNC: 14.7 G/DL (ref 13.5–17.5)
MCH RBC QN AUTO: 26.1 PG (ref 26–34)
MCHC RBC AUTO-ENTMCNC: 32 G/DL (ref 32–36)
MCV RBC AUTO: 81 FL (ref 80–100)
NRBC BLD-RTO: 0 /100 WBCS (ref 0–0)
PLATELET # BLD AUTO: 226 X10*3/UL (ref 150–450)
POTASSIUM SERPL-SCNC: 3.7 MMOL/L (ref 3.5–5.3)
RBC # BLD AUTO: 5.64 X10*6/UL (ref 4.5–5.9)
SODIUM SERPL-SCNC: 141 MMOL/L (ref 136–145)
VANCOMYCIN SERPL-MCNC: 9.6 UG/ML (ref 5–20)
WBC # BLD AUTO: 9.4 X10*3/UL (ref 4.4–11.3)

## 2025-07-01 PROCEDURE — RXMED WILLOW AMBULATORY MEDICATION CHARGE

## 2025-07-01 PROCEDURE — 2500000004 HC RX 250 GENERAL PHARMACY W/ HCPCS (ALT 636 FOR OP/ED): Performed by: PHARMACIST

## 2025-07-01 PROCEDURE — 82947 ASSAY GLUCOSE BLOOD QUANT: CPT

## 2025-07-01 PROCEDURE — 2500000001 HC RX 250 WO HCPCS SELF ADMINISTERED DRUGS (ALT 637 FOR MEDICARE OP): Performed by: INTERNAL MEDICINE

## 2025-07-01 PROCEDURE — 99239 HOSP IP/OBS DSCHRG MGMT >30: CPT | Performed by: INTERNAL MEDICINE

## 2025-07-01 PROCEDURE — 80202 ASSAY OF VANCOMYCIN: CPT | Performed by: PHARMACIST

## 2025-07-01 PROCEDURE — 85027 COMPLETE CBC AUTOMATED: CPT | Performed by: INTERNAL MEDICINE

## 2025-07-01 PROCEDURE — 36415 COLL VENOUS BLD VENIPUNCTURE: CPT | Performed by: INTERNAL MEDICINE

## 2025-07-01 PROCEDURE — 80048 BASIC METABOLIC PNL TOTAL CA: CPT | Performed by: PHARMACIST

## 2025-07-01 RX ORDER — DOXYCYCLINE 100 MG/1
100 CAPSULE ORAL 2 TIMES DAILY
Qty: 14 CAPSULE | Refills: 0 | Status: SHIPPED | OUTPATIENT
Start: 2025-07-01 | End: 2025-07-08

## 2025-07-01 RX ORDER — AMOXICILLIN AND CLAVULANATE POTASSIUM 875; 125 MG/1; MG/1
1 TABLET, FILM COATED ORAL 2 TIMES DAILY
Qty: 14 TABLET | Refills: 0 | Status: SHIPPED | OUTPATIENT
Start: 2025-07-01 | End: 2025-07-08

## 2025-07-01 RX ADMIN — PIPERACILLIN SODIUM AND TAZOBACTAM SODIUM 3.38 G: 3; .375 INJECTION, SOLUTION INTRAVENOUS at 02:04

## 2025-07-01 RX ADMIN — FENOFIBRATE 54 MG: 54 TABLET ORAL at 08:29

## 2025-07-01 RX ADMIN — PIPERACILLIN SODIUM AND TAZOBACTAM SODIUM 3.38 G: 3; .375 INJECTION, SOLUTION INTRAVENOUS at 08:30

## 2025-07-01 RX ADMIN — AMLODIPINE BESYLATE 10 MG: 10 TABLET ORAL at 08:28

## 2025-07-01 RX ADMIN — LOSARTAN POTASSIUM 100 MG: 50 TABLET, FILM COATED ORAL at 08:28

## 2025-07-01 RX ADMIN — EMPAGLIFLOZIN 25 MG: 10 TABLET, FILM COATED ORAL at 08:28

## 2025-07-01 ASSESSMENT — COGNITIVE AND FUNCTIONAL STATUS - GENERAL
MOBILITY SCORE: 24
DAILY ACTIVITIY SCORE: 24

## 2025-07-01 ASSESSMENT — PAIN - FUNCTIONAL ASSESSMENT
PAIN_FUNCTIONAL_ASSESSMENT: 0-10
PAIN_FUNCTIONAL_ASSESSMENT: 0-10

## 2025-07-01 ASSESSMENT — PAIN SCALES - GENERAL
PAINLEVEL_OUTOF10: 0 - NO PAIN
PAINLEVEL_OUTOF10: 0 - NO PAIN

## 2025-07-01 NOTE — PROGRESS NOTES
"Franck Mckinley is a 59 y.o. male who was referred to the Clinical Pharmacy Team to complete a Transitions of Care encounter for discharge medication optimization. The patient was referred for their T2DM.    Attending: Lizz Ward MD    PCP: Piper Alvarado MD    _______________________________________________________________________  PHARMACY ASSESSMENT    [facility]  Meds to beds? yes    Affordability/Accessibility: reports medications are affordable   Adherence/Organization: no concerns   Adverse Effects: none reported   _______________________________________________________________________  DIABETES ASSESSMENT    CURRENT PHARMACOTHERAPY  - Mounjaro 15 mg weekly  - Toujeo 56 units nightly   - Novolog sliding scale  - Jardiance 25 mg daily       HISTORICAL PHARMACOTHERAPY  - n/a    BLOOD SUGAR TESTING AT HOME  -CGM: Yes    Diet:   - report his diet is \"okay\" as he does not have an appetite due to the Mounjaro    Social History:  - unable to assess     Exercise Routine:   - unable to exercise due to toe pain/Charcots    Additional Contributory Factors [medications, comorbidities]   - MARGE, obesity    SECONDARY PREVENTION  - Statin? Pravastatin 20 mg  - ACE-I/ARB? Losartan 100 mg  - Aspirin? none    Lab Results   Component Value Date    HGBA1C 6.4 (H) 06/28/2025       Lab Results   Component Value Date    CHOL 131 05/09/2025    CHOL 122 01/10/2025    CHOL 130 12/12/2023     Lab Results   Component Value Date    HDL 43 05/09/2025    HDL 39.6 01/10/2025    HDL 39.2 12/12/2023     Lab Results   Component Value Date    LDLCALC 64 05/09/2025    LDLCALC 51 01/10/2025    LDLCALC 54 12/12/2023     Lab Results   Component Value Date    TRIG 153 (H) 05/09/2025    TRIG 156 (H) 01/10/2025    TRIG 185 (H) 12/12/2023     No components found for: \"CHOLHDL\"  ___________________________________________________________________  PATIENT EDUCATION/GOALS  - Continue to work on diet and " lifestyle modifications   - Introduced post-discharge pharmacy team follow up and benefits of calls  - Answered all patient questions and concerns to best of my ability  _______________________________________________________________________  RECOMMENDATIONS/PLAN  Consider switching pravastatin to a high-intensity statin because of known diabetes and 10-year risk >=7.5%.   Continue all medications per medical team  Please send prescriptions to [facility] pharmacy for assistance on insurance prior authorization and copay. Prescriptions will be delivered to the patient's bedside prior to discharge with the Meds to Beds program.   Continuity of care will be provided by PCP and clinical pharmacy team      Hanna Tineo, PharmD  PGY-1 Pharmacy Resident     Verbal consent to manage patient's drug therapy was obtained from the patient. They were informed they may decline to participate or withdraw from participation in pharmacy services at any time.

## 2025-07-01 NOTE — CARE PLAN
The patient's goals for the shift include To get better    The clinical goals for the shift include monitor for s/s of infection

## 2025-07-01 NOTE — DISCHARGE SUMMARY
Discharge Diagnosis  Charcot's foot           Issues Requiring Follow-Up  Follow-up with podiatry    Discharge Meds     Medication List      START taking these medications     amoxicillin-clavulanate 875-125 mg tablet; Commonly known as: Augmentin;   Take 1 tablet by mouth 2 times a day for 7 days.   doxycycline 100 mg capsule; Commonly known as: Vibramycin; Take 1   capsule (100 mg) by mouth 2 times a day for 7 days. Take with at least 8   ounces (large glass) of water, do not lie down for 30 minutes after     CONTINUE taking these medications     acetaminophen 325 mg tablet; Commonly known as: Tylenol   amLODIPine 10 mg tablet; Commonly known as: Norvasc; TAKE 1 TABLET DAILY   atomoxetine 10 mg capsule; Commonly known as: Strattera; TAKE 1 CAPSULE   ONCE DAILY. SWALLOW WHOLE; DO NOT OPEN. IF OPENED ACCIDENTALLY, DO NOT   TOUCH EYES; WASH HANDS IMMEDIATELY (PRODUCT IS AN EYE IRRITANT)   cholecalciferol 25 mcg (1,000 units) tablet; Commonly known as: Vitamin   D-3   cyanocobalamin 1,000 mcg/mL injection; Commonly known as: Vitamin B-12   ergocalciferol 1250 mcg (50,000 units) capsule; Commonly known as:   Vitamin D-2; TAKE 1 CAPSULE BY MOUTH ONE TIME PER WEEK   fenofibrate 48 mg tablet; Commonly known as: Tricor; Take 1 tablet (48   mg) by mouth once daily. as directed   insulin aspart 100 unit/mL (3 mL) pen; Commonly known as: NovoLOG   Flexpen U-100 Insulin; Inject 12 units before each meal + sliding scale on   Mon-Thur; Inject 20 units before each meal plus sliding scale on Fri-sun.   Maximum daily dose 80 units   insulin glargine 300 unit/mL (1.5 mL) pen; Commonly known as: Toujeo   SoloStar U-300 Insulin; INJECT 53 UNITS SUBCUTANEOUSLY AT BEDTIME   Jardiance 25 mg tablet; Generic drug: empagliflozin; Take 1 tablet (25   mg) by mouth once daily.   losartan 100 mg tablet; Commonly known as: Cozaar; Take 1 tablet (100   mg) by mouth once daily.   Mounjaro 15 mg/0.5 mL pen injector; Generic drug: tirzepatide;  Patient to floor Post-Procedure. "Inject 15   mg under the skin 1 (one) time per week.   pen needle, diabetic 31 gauge x 3/16\" needle; Commonly known as: BD   Ultra-Fine Mini Pen Needle; USE TO INJECT INSULIN 4    TIMES A DAY   pravastatin 20 mg tablet; Commonly known as: Pravachol; Take 1 tablet   (20 mg) by mouth once daily at bedtime.   sildenafil 100 mg tablet; Commonly known as: Viagra; Take 1 tablet (100   mg) by mouth if needed for erectile dysfunction (Start with half tab. Take   1 hour prior to intercourse on an empty stomach. If you have an erection   for over 4 hours, please go to the emergency room.).   * testosterone 20.25 mg/1.25 gram (1.62 %) gel in metered-dose pump;   Place 4 Pump (2 pumps to each shoulder) on the skin once daily.   * testosterone 20.25 mg/1.25 gram (1.62 %) gel in metered-dose pump;   Place 4 Pump on the skin once daily. 2 pumps to each shoulder   * testosterone 20.25 mg/1.25 gram (1.62 %) gel in metered-dose pump;   Place 2 pumps on the skin once daily. 1 pumps to each shoulder  * This list has 3 medication(s) that are the same as other medications   prescribed for you. Read the directions carefully, and ask your doctor or   other care provider to review them with you.       Test Results Pending At Discharge  Pending Labs       Order Current Status    Blood Culture Preliminary result            Hospital Course   Patient with a past medical history of pretension dyslipidemia type 2 diabetes mellitus insulin-dependent morbid obesity previous history of osteomyelitis of the foot status post amputation presents with increasing swelling redness of the right foot over the last few days  Patient had seen his podiatrist recently and was prescribed doxycycline and was advised that if symptoms got worse he should come to the ER and asked what he did  Denies any increased pain because of foot feels mostly numb    Initially podiatry had recommended MRI of the foot to rule out osteomyelitis  But then the MRI was canceled as " their opinion was that MRI cannot differentiate between Charcot's foot and osteomyelitis  Patient has remained afebrile with normal white counts  We consulted infectious disease for their opinion and they agree that this is more likely Charcot's foot  However they will use antibiotics empirically  He will go home on 100 doxycycline twice daily and Augmentin 875 twice daily for 1 week  Patient does not have any pain because of neuropathy  Diabetes remains well-controlled with an A1c of 6.4    Discharged with outpatient follow-up with podiatry    Pertinent Physical Exam At Time of Discharge  Physical Exam      Constitutional   General appearance: Alert and in no acute distress.     Pulmonary   Respiratory assessment: No respiratory distress, normal respiratory rhythm and effort.    Auscultation of Lungs: Clear bilateral breath sounds.   Cardiovascular   Auscultation of heart: Apical pulse normal, heart rate and rhythm normal, normal S1 and S2, no murmurs and no pericardial rub.    Exam for edema: No peripheral edema.   Abdomen   Abdominal Exam: No bruits, normal bowel sounds, soft, non-tender, no abdominal mass palpated.    Liver and Spleen exam: No hepato-splenomegaly.   Musculoskeletal   Sausage toe  Skin   Skin inspection: Normal skin color and pigmentation, normal skin turgor and no visible rash.   Neurologic   Cranial nerves: Nerves 2-12 were intact, no focal neuro defects.    Outpatient Follow-Up  Future Appointments   Date Time Provider Department Center   7/11/2025  8:00 AM PRIMARY MRSA SOLON3 RN 1 [id number] [facility]   7/22/2025  1:15 PM Ari Simmons MD [id number] [facility]   8/11/2025  8:40 [facility]   8/12/2025  9:00 AM Piper Alvarado MD [id number] [facility]   10/7/2025  9:45 AM [id number]   10/21/2025 10:00 AM Ari Simmons MD [id number] [facility]   1/8/2026  2:30 PM Lula Badillo APRN-CNP [id number]   1/20/2026  8:30 AM Ari SHORE  MD Iris [id number] [facility]   4/21/2026  8:45 AM Ari Simmons MD [id number] [facility]     Patient seen at bedside. Events from the last visit reviewed. Discussed with staff. Results of tests and investigations from last visit reviewed and discussed with patient/Family. Electronic chart on [facility] reviewed. Input / Recommendations  from consultants  appreciated and reviewed and agreed with.     discharge summary and profile completed. medications reviewed and discussed with patient and family.  scripts completed and signed.     total discharge time in excess of 30 minutes.      Lizz Ward MD

## 2025-07-01 NOTE — DISCHARGE INSTRUCTIONS
Please have Right Heel weight bearing as tolerated  Dressing: betadine soaked gauze to right 2nd toe, 4x4 gauze, kerlix, ACE wrap daily per Podietry  Please follow up with Podiatry in 2-3 wks after discharge  You are being sent home on two Antibiotics as below for Charcot arthropathy of your Right 2nd Toe amoxicillin-clavulanate (Augmentin)   doxycycline (Vibramycin)

## 2025-07-01 NOTE — PROGRESS NOTES
"PODIATRIC MEDICINE & SURGERY - PROGRESS NOTE    Subjective   Franck Mckinley is a 59 y.o. male who is on day 3 of admission for right foot cellulitis.     Interval HPI: Patient resting comfortably in bed. Reports pain is well-controlled. Denies current constitutional sx. No further complaints.     Overnight events: None    REVIEW OF SYSTEMS  A 10+ point ROS was completed and is negative unless as otherwise noted.    Past Medical History  Medical History[1]  Allergies  RX Allergies[2]  Medications  Scheduled Medications[3]  Continuous Medications[4]  PRN Medications[5]    Objective   Visit Vitals  /89 (BP Location: Left arm, Patient Position: Lying)   Pulse 70   Temp 37 °C (98.6 °F) (Temporal)   Resp 16   Ht (!) 2.083 m (6' 10\")   Wt (!) 152 kg (335 lb)   SpO2 98%   BMI 35.03 kg/m²   Smoking Status Former   BSA 2.97 m²       PHYSICAL EXAM  Constitutional: NAD and AAOx3.   HEENT: Head is normocephalic and atraumatic. External ear is normal B/L. Conjunctivae normal B/L. Nose is normal. Oropharynx is clear. Mouth is moist.   Cardiovascular: Normal rate.  Pulmonary: No increased work of breathing.  Psychological: Appropriate mood and behavior.    Lower Extremity Exam  Vascular: DP and PT pulses are palpable bilateral.  Capillary fill time is less than 3 seconds to the toes bilateral.  Skin temperature is warm proximal to distal lower extremity bilateral. Gross erythema and edema to the right 2nd toe.     Neurologic:  Light touch is intact to the foot bilateral. Protective sensation is absent to the foot bilateral.     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 Hallux malleus bilateral. Hallux varus, right. Rigid hammer toes 3-5 B/L. Prominent 2nd and 3rd metatarsal heads, right plantar mass from charcot. Ankle joint ROM is decreased with dorsiflexion B/L.    Dermatologic: The pedal interdigital spaces are clean and dry. Gross erythema and edema of the right 2nd toe. Wound as " described below    WOUND #1: Right 2nd toe  Wound measuring 2.5 cm x 1.2 x 0.1 cm at the dorsal aspect of the right 2nd toe. Cellulitis extends to the base of the digit. Serosanguinous drainage with no malodor. The wound does not probe to bone.         Lab Results   Component Value Date    WBC 9.4 07/01/2025    HGB 14.7 07/01/2025    HCT 45.9 07/01/2025    MCV 81 07/01/2025     07/01/2025     Lab Results   Component Value Date    GLUCOSE 75 07/01/2025    CALCIUM 8.5 (L) 07/01/2025     07/01/2025    K 3.7 07/01/2025    CO2 21 07/01/2025     (H) 07/01/2025    BUN 17 07/01/2025    CREATININE 1.06 07/01/2025     Lab Results   Component Value Date    HGBA1C 6.4 (H) 06/28/2025     No results found for the last 90 days.    Imaging  XR foot right 3+ views  Result Date: 6/28/2025  Severe destructive arthritis in the 2nd MTP joint with erosive changes and osseous destruction of the 2nd proximal phalangeal base of the 2nd metatarsal head consistent with septic arthritis and osteomyelitis. Septic arthritis also present in the 1st MTP joint with widening and subluxation of the joint along with erosive changes of 1st metatarsal head. Findings concerning for osteomyelitis in the 3rd distal metatarsal joint as well versus remote fracture deformity. Soft tissue edema about the forefoot predominately in the 2nd toe. MRI can be performed for further evaluation of these findings.     MACRO: None   Signed by: Christiano River 6/28/2025 10:05 PM Dictation workstation:   [id number]    Cardiology, Vascular, and Other Imaging  No other imaging results found for the past 7 days    Assessment/Plan   Franck Mckinley is a 59 y.o. male who is on day 3 of admission for cellulitis of right foot.     #Chronic non-pressure ulceration of right toe with skin layer exposed - R 2nd toe  #Cellulitis, right foot  #T2DM with peripheral neuropathy  #Charcot neuroarthropathy of right forefoot    Reviewed interval vitals, labs, imaging  and notes  Reviewed XR right foot. Radiographic findings are consistent with Charcot neuroarthropathy at the 2nd and 3rd metatarsal phalangeal joints.    Patient with Charcot neuroarthropathy of the right foot, in addition to cellulitis of the right 2nd toe. Low suspicion for osteomyelitis of metatarsals at this time. No need for MRI at this time, as MRI cannot differentiate OM vs Charcot.     Per ID recs, patient will be discharged on oral Abx.      Heel weight bearing as tolerated  Dressing: betadine soaked gauze to right 2nd toe, 4x4 gauze, claudia, ACE    Cleared for discharged from a podiatry stand point. Will follow up with Dr. Barksdale outpatient. See discharge orders for details.     Plan discussed with attending physician Dr. Yajaira Rodriguez DPM.    Alis Wilkins DPM PGY-1  Podiatric Medicine & Surgery               [1]   Past Medical History:  Diagnosis Date    Abnormal finding of blood chemistry, unspecified 06/21/2017    Abnormal blood chemistry    Acute pharyngitis, unspecified 11/11/2019    Pharyngitis    Acute pharyngitis, unspecified 08/18/2020    Sore throat    Anosmia 04/19/2021    Anosmia    Calculus of kidney 01/04/2021    Kidney stone on right side    Cellulitis, unspecified 12/21/2022    Cellulitis    Chronic sinusitis, unspecified 04/26/2018    Sinusitis    Cough, unspecified 04/19/2021    Cough    Disorder of kidney and ureter, unspecified 11/14/2022    Renal insufficiency    Dorsalgia, unspecified 12/22/2020    Back pain    Edema, unspecified 11/16/2022    Edema    Encounter for screening for malignant neoplasm of colon 02/11/2020    Colon cancer screening    Encounter for screening for malignant neoplasm of prostate 01/19/2022    Screening for prostate cancer    Encounter for screening for malignant neoplasm of prostate 05/19/2020    Encounter for prostate cancer screening    Essential (primary) hypertension 11/16/2022    Benign essential hypertension    Fever, unspecified 04/19/2021     Fever    Follicular cyst of the skin and subcutaneous tissue, unspecified 11/11/2019    Infected cyst of skin    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    Low HDL (under 40)    Liver disease, unspecified 01/27/2021    Liver lesion    Male erectile dysfunction, unspecified 02/07/2020    Erectile dysfunction    Myalgia, unspecified site 08/18/2020    Myalgia    Other chest pain 01/19/2022    Chest discomfort    Other conditions influencing health status 01/19/2022    Skin tear    Other fatigue 03/21/2019    Fatigue    Other malaise 08/18/2020    Malaise and fatigue    Other nonspecific abnormal finding of lung field 02/10/2021    Abnormal CT scan, lung    Pain in right knee 06/26/2020    Right knee pain    Pain in right shoulder 03/30/2022    Right shoulder pain    Proteinuria, unspecified 11/14/2022    Proteinuria    Proteinuria, unspecified 11/13/2015    Proteinuria    Type 2 diabetes mellitus without complications 12/22/2022    Diabetes mellitus    Unspecified abdominal pain 05/26/2022    Abdominal pain    Unspecified open wound of unspecified toe(s) without damage to nail, initial encounter 03/16/2020    Wound, open, toe   [2]   Allergies  Allergen Reactions    Chocolate Unknown   [3] amLODIPine, 10 mg, oral, Daily  cyanocobalamin, 1,000 mcg, intramuscular, Once  docusate sodium, 100 mg, oral, BID  empagliflozin, 25 mg, oral, Daily  enoxaparin, 40 mg, subcutaneous, q24h  fenofibrate, 54 mg, oral, Daily  insulin glargine, 43 Units, subcutaneous, q24h  insulin lispro, 0-5 Units, subcutaneous, Before meals & nightly  losartan, 100 mg, oral, Daily  pantoprazole, 40 mg, oral, Daily before breakfast  piperacillin-tazobactam, 3.375 g, intravenous, q6h  pravastatin, 20 mg, oral, Nightly  vancomycin, 1,500 mg, intravenous, q12h     [4]    [5] PRN medications: acetaminophen, melatonin, ondansetron, traMADol, vancomycin

## 2025-07-01 NOTE — CONSULTS
INFECTIOUS DISEASE INPATIENT INITIAL CONSULTATION    Referred By: Lizz Ward    Reason For Consult: Charcot's toe versus osteomyelitis     HPI:  This is a 59 y.o. male with PMH of DM II and HTN who presented with redness in the right 2nd toe.    Was on PO Doxy from his podiatrist for a few days prior to admission. Has more swelling/redness in the toe. No fever/chills.    Podiatry has seen here. He has been previously diagnosed with Charcot neuropathy flare. X-ray here with signs of possible septic arthritis/OM. Afebrile. WBC is normal. CRP is 9. On IV Vanc/Zosyn. Blood cx x1 NGTD.    Allergies:  Chocolate     Vitals (Last 24 Hours):  Heart Rate:  [61-74]   Temp:  [36 °C (96.8 °F)-37.2 °C (99 °F)]   Resp:  [14-21]   BP: (135-162)/(68-90)   SpO2:  [94 %-98 %]      PHYSICAL EXAM:  Gen - NAD  Heart - RRR  Abd - soft, no ttp, BS present  Right Foot - 2nd toe swollen with bruised appearance and some swelling of plantar side fat pad over the metatarsal. No warmth and no signs of ascending infection.  Skin - no rash    MEDS:  Current Medications[1]     LABS:  Lab Results   Component Value Date    WBC 9.4 07/01/2025    HGB 14.7 07/01/2025    HCT 45.9 07/01/2025    MCV 81 07/01/2025     07/01/2025      Results from last 72 hours   Lab Units 06/30/25  0750   SODIUM mmol/L 141   POTASSIUM mmol/L 3.9   CHLORIDE mmol/L 107   CO2 mmol/L 23   BUN mg/dL 22   CREATININE mg/dL 1.10   GLUCOSE mg/dL 83   CALCIUM mg/dL 8.8   ANION GAP mmol/L 15   EGFR mL/min/1.73m*2 77         Estimated Creatinine Clearance: 123.8 mL/min (by C-G formula based on SCr of 1.1 mg/dL).    C-Reactive Protein   Date/Time Value Ref Range Status   06/28/2025 09:18 PM 9.14 (H) <1.00 mg/dL Final     CRP   Date/Time Value Ref Range Status   07/23/2023 03:32 PM 2.50 (A) mg/dL Final     Comment:     REF VALUE  < 1.00     10/17/2017 09:12 AM 0.58 mg/dL Final     Comment:     REF VALUE  < 1.00       Sedimentation Rate   Date/Time Value Ref Range Status    06/28/2025 09:18 PM 62 (H) 0 - 20 mm/h Final   07/23/2023 03:32 PM 12 0 - 20 mm/h Final   10/17/2017 09:12 AM 5 0 - 20 mm/h Final       IMAGING:  X-Ray Right Foot 6/28  Impression:     Severe destructive arthritis in the 2nd MTP joint with erosive  changes and osseous destruction of the 2nd proximal phalangeal base  of the 2nd metatarsal head consistent with septic arthritis and  osteomyelitis. Septic arthritis also present in the 1st MTP joint  with widening and subluxation of the joint along with erosive changes  of 1st metatarsal head. Findings concerning for osteomyelitis in the  3rd distal metatarsal joint as well versus remote fracture deformity.  Soft tissue edema about the forefoot predominately in the 2nd toe.  MRI can be performed for further evaluation of these findings.       ASSESSMENT/PLAN:    Right 2nd Toe Charcot Arthropathy  DM II - well controlled, Hgb A1C 6.4% currently    Agree with podiatry this seems to be more Charcot related. He could have outpatient MRI in the near future and continued follow-up with podiatry to consider if surgical intervention is needed. Might need 2nd toe amputation in the future.    OK to go home with empiric PO abx. PO Doxy 100mg BID and PO Augmentin 875mg BID for 1 week.    Will sign off. Please call back with questions. Thanks!    Rohit Eng MD  [facility]  Office #[phone]           [1]   Current Facility-Administered Medications:     acetaminophen (Tylenol) tablet 650 mg, 650 mg, oral, q6h PRN, Lizz Ward MD    amLODIPine (Norvasc) tablet 10 mg, 10 mg, oral, Daily, Lizz Ward MD, 10 mg at 06/30/25 0913    docusate sodium (Colace) capsule 100 mg, 100 mg, oral, BID, Lizz Ward MD    empagliflozin (Jardiance) tablet 25 mg, 25 mg, oral, Daily, Lizz Ward MD, 25 mg at 06/30/25 0913    enoxaparin (Lovenox) syringe 40 mg, 40 mg, subcutaneous, q24h, Lizz Ward MD    fenofibrate (Tricor) tablet 54 mg, 54 mg, oral, Daily,  Lizz Ward MD, 54 mg at 06/30/25 0913    insulin glargine (Lantus) injection 43 Units, 43 Units, subcutaneous, q24h, Lizz Ward MD, 43 Units at 06/29/25 2200    insulin lispro injection 0-5 Units, 0-5 Units, subcutaneous, Before meals & nightly, Lizz Ward MD, 1 Units at 06/29/25 2200    losartan (Cozaar) tablet 100 mg, 100 mg, oral, Daily, Lizz Ward MD, 100 mg at 06/30/25 0913    melatonin tablet 3 mg, 3 mg, oral, Nightly PRN, Lizz Ward MD    ondansetron (Zofran) injection 4 mg, 4 mg, intravenous, q6h PRN, Lizz Ward MD    pantoprazole (ProtoNix) EC tablet 40 mg, 40 mg, oral, Daily before breakfast, Lizz Ward MD    piperacillin-tazobactam (Zosyn) 3.375 g in dextrose (iso) IV 50 mL, 3.375 g, intravenous, q6h, Olya BaptisteD, Stopped at 07/01/25 0236    pravastatin (Pravachol) tablet 20 mg, 20 mg, oral, Nightly, Lizz Ward MD, 20 mg at 06/30/25 2007    traMADol (Ultram) tablet 50 mg, 50 mg, oral, q6h PRN, Lizz Ward MD    vancomycin (Vancocin) 1,250 mg in dextrose 5%  mL, 1,250 mg, intravenous, q12h, Olya BaptisteD, Stopped at 06/30/25 2323    vancomycin (Vancocin) pharmacy to dose - pharmacy monitoring, , miscellaneous, Daily PRN, Olya BaptisteD

## 2025-07-01 NOTE — PROGRESS NOTES
Vancomycin Dosing by Pharmacy- FOLLOW UP    Franck Mckinley is a 59 y.o. year old male who Pharmacy has been consulted for vancomycin dosing for cellulitis, skin and soft tissue. Based on the patient's indication and renal status this patient is being dosed based on a goal AUC of 400-600.     Renal function is currently stable.    Current vancomycin dose: 1250 mg given every 12 hours    Estimated vancomycin AUC on current dose: 363 mg/L.hr     Visit Vitals  /89 (BP Location: Right arm, Patient Position: Lying)   Pulse 61   Temp 37.2 °C (99 °F) (Temporal)   Resp 14        Lab Results   Component Value Date    CREATININE 1.06 2025    CREATININE 1.10 2025    CREATININE 1.21 2025    CREATININE 1.18 2025    CREATININE 1.06 01/10/2025        Patient weight is as follows:   Vitals:    25   Weight: (!) 152 kg (335 lb)       Cultures:  No results found for the encounter in last 14 days.       I/O last 3 completed shifts:  In: 940 (6.2 mL/kg) [P.O.:490; IV Piggyback:450]  Out: - (0 mL/kg)   Weight: 152 kg   I/O during current shift:  No intake/output data recorded.    Temp (24hrs), Av.9 °C (98.4 °F), Min:36 °C (96.8 °F), Max:37.2 °C (99 °F)      Assessment/Plan    Below goal AUC. Orders placed for new vancomcyin regimen of 1500 every 12 hours to begin at 1100.     This dosing regimen is predicted by [employer] to result in the following pharmacokinetic parameters:  Exposure target: AUC24 (range) 400-600 mg/L.hr   RMI76-08: 403 mg/L.hr  AUC24,ss: 434 mg/L.hr  Probability of AUC24 > 400: 71 %  Ctrough,ss: 13.9 mg/L  Probability of Ctrough,ss > 20: 6 %  The next level will be obtained on  at 0500. May be obtained sooner if clinically indicated.   Will continue to monitor renal function daily while on vancomycin and order serum creatinine at least every 48 hours if not already ordered.  Follow for continued vancomycin needs, clinical response, and signs/symptoms of toxicity.        Juana Chamorro, PharmD

## 2025-07-01 NOTE — PROGRESS NOTES
07/01/25 1213   Discharge Planning   Home or Post Acute Services None   Expected Discharge Disposition Home     Pt has dc order in. PO Doxy 100mg BID and PO Augmentin 875mg BID for 1 week. Pt declines HHC at this time.

## 2025-07-01 NOTE — HH CARE COORDINATION
Home Care received a referral for Nursing. Unfortunately, we are unable to accept and process the referral at this time.    Reason:  Patient Declines Home Care , Patient is not Homebound per CMS Guidelines    Patients, please reach out to the referring provider or your PCP to assist in obtaining an alternative home care agency and/or guidance to meet your needs.    Providers, please reach out to  Home Care at [phone] with any questions regarding the declined referral.

## 2025-07-03 LAB — BACTERIA BLD CULT: NORMAL

## 2025-07-05 DIAGNOSIS — F98.8 ATTENTION DEFICIT DISORDER, UNSPECIFIED TYPE: ICD-10-CM

## 2025-07-08 DIAGNOSIS — M14.60 CHARCOT ARTHROPATHY: ICD-10-CM

## 2025-07-08 RX ORDER — ATOMOXETINE 10 MG/1
CAPSULE ORAL
Qty: 90 CAPSULE | Refills: 0 | Status: SHIPPED | OUTPATIENT
Start: 2025-07-08

## 2025-07-09 ENCOUNTER — PHARMACY VISIT (OUTPATIENT)
Dept: PHARMACY | Facility: CLINIC | Age: 60
End: 2025-07-09
Payer: MEDICARE

## 2025-07-09 DIAGNOSIS — E11.69 TYPE 2 DIABETES MELLITUS WITH OTHER SPECIFIED COMPLICATION, WITH LONG-TERM CURRENT USE OF INSULIN: Primary | ICD-10-CM

## 2025-07-09 DIAGNOSIS — Z79.4 TYPE 2 DIABETES MELLITUS WITH OTHER SPECIFIED COMPLICATION, WITH LONG-TERM CURRENT USE OF INSULIN: Primary | ICD-10-CM

## 2025-07-11 ENCOUNTER — APPOINTMENT (OUTPATIENT)
Dept: PRIMARY CARE | Facility: CLINIC | Age: 60
End: 2025-07-11
Payer: COMMERCIAL

## 2025-07-11 DIAGNOSIS — E53.8 B12 DEFICIENCY: Primary | ICD-10-CM

## 2025-07-11 PROCEDURE — 96372 THER/PROPH/DIAG INJ SC/IM: CPT | Performed by: INTERNAL MEDICINE

## 2025-07-11 RX ORDER — CYANOCOBALAMIN 1000 UG/ML
1000 INJECTION, SOLUTION INTRAMUSCULAR; SUBCUTANEOUS ONCE
Status: COMPLETED | OUTPATIENT
Start: 2025-07-11 | End: 2025-07-11

## 2025-07-11 RX ADMIN — CYANOCOBALAMIN 1000 MCG: 1000 INJECTION, SOLUTION INTRAMUSCULAR; SUBCUTANEOUS at 08:10

## 2025-07-18 ENCOUNTER — TELEPHONE (OUTPATIENT)
Dept: ENDOCRINOLOGY | Facility: CLINIC | Age: 60
End: 2025-07-18
Payer: COMMERCIAL

## 2025-07-18 NOTE — TELEPHONE ENCOUNTER
Received a DWO from [facility] on 7/14  Completed and Faxed back to [phone] on 7/18

## 2025-07-18 NOTE — TELEPHONE ENCOUNTER
Received faxed request from [facility] on 7/14 for most recent office notes.     Notes from June 2025 faxed to [phone] on 7/18

## 2025-07-20 DIAGNOSIS — E29.1 HYPOGONADISM IN MALE: ICD-10-CM

## 2025-07-22 ENCOUNTER — APPOINTMENT (OUTPATIENT)
Dept: DERMATOLOGY | Facility: CLINIC | Age: 60
End: 2025-07-22
Payer: COMMERCIAL

## 2025-07-22 DIAGNOSIS — Z85.820 PERSONAL HISTORY OF MALIGNANT MELANOMA OF SKIN: ICD-10-CM

## 2025-07-22 DIAGNOSIS — B35.3 TINEA PEDIS OF LEFT FOOT: ICD-10-CM

## 2025-07-22 DIAGNOSIS — L81.4 LENTIGO: ICD-10-CM

## 2025-07-22 DIAGNOSIS — D48.5 NEOPLASM OF UNCERTAIN BEHAVIOR OF SKIN: Primary | ICD-10-CM

## 2025-07-22 DIAGNOSIS — B35.3 TINEA PEDIS OF RIGHT FOOT: ICD-10-CM

## 2025-07-22 DIAGNOSIS — L82.1 SEBORRHEIC KERATOSIS: ICD-10-CM

## 2025-07-22 DIAGNOSIS — D22.5 MELANOCYTIC NEVUS OF TRUNK: ICD-10-CM

## 2025-07-22 DIAGNOSIS — L21.9 SEBORRHEIC DERMATITIS: ICD-10-CM

## 2025-07-22 DIAGNOSIS — L57.0 ACTINIC KERATOSIS: ICD-10-CM

## 2025-07-22 DIAGNOSIS — L57.8 DIFFUSE PHOTODAMAGE OF SKIN: ICD-10-CM

## 2025-07-22 PROCEDURE — 4010F ACE/ARB THERAPY RXD/TAKEN: CPT | Performed by: DERMATOLOGY

## 2025-07-22 PROCEDURE — 11302 SHAVE SKIN LESION 1.1-2.0 CM: CPT | Performed by: DERMATOLOGY

## 2025-07-22 PROCEDURE — 17000 DESTRUCT PREMALG LESION: CPT | Performed by: DERMATOLOGY

## 2025-07-22 PROCEDURE — 17003 DESTRUCT PREMALG LES 2-14: CPT | Performed by: DERMATOLOGY

## 2025-07-22 PROCEDURE — 3044F HG A1C LEVEL LT 7.0%: CPT | Performed by: DERMATOLOGY

## 2025-07-22 PROCEDURE — 99214 OFFICE O/P EST MOD 30 MIN: CPT | Performed by: DERMATOLOGY

## 2025-07-22 PROCEDURE — 11301 SHAVE SKIN LESION 0.6-1.0 CM: CPT | Performed by: DERMATOLOGY

## 2025-07-22 RX ORDER — KETOCONAZOLE 20 MG/G
CREAM TOPICAL 2 TIMES DAILY
Qty: 60 G | Refills: 11 | Status: SHIPPED | OUTPATIENT
Start: 2025-07-22

## 2025-07-22 RX ORDER — KETOCONAZOLE 20 MG/ML
SHAMPOO, SUSPENSION TOPICAL
Qty: 120 ML | Refills: 11 | Status: SHIPPED | OUTPATIENT
Start: 2025-07-22

## 2025-07-22 NOTE — Clinical Note
Tinea Pedis -left foot.  The fungal nature of this condition and treatment options were discussed extensively with the patient today.  At this time, I recommend topical anti-fungal therapy with Ketoconazole 2% cream, which the patient was instructed to apply twice daily to the affected areas of the feet for the next 3-4 weeks; the patient may repeat treatment in a similar fashion as needed for future flares.  The risks, benefits, and side effects of this medication were discussed.  The patient expressed understanding and is in agreement with this plan.

## 2025-07-22 NOTE — PROGRESS NOTES
Subjective     Franck Mckinley is a 59 y.o. male who presents for the following: Skin Check.  He states he has not noticed any changes in any of his brown spots recently, including in size, shape, or color, and they are all asymptomatic with no associated bleeding, itching, or pain.  He notes dry patches in his beard area occasionally, especially during the winter.  He denies any other new, changing, or concerning skin lesions; no bleeding, itching, or burning lesions.      Review of Systems:  No other skin or systemic complaints other than what is documented elsewhere in the note.    The following portions of the chart were reviewed this encounter and updated as appropriate:       Skin Cancer History  Biopsy Log Book  Biopsied Type Location Status   4/2/25 Melanoma in Situ Right Flank Refer Mohs/Surgeon  4/30/25       Additional History      Specialty Problems          Dermatology Problems    Hemangioma of skin and subcutaneous tissue    Melanocytic nevi of other parts of face    Melanocytic nevi of right lower limb, including hip    Melanocytic nevi of scalp and neck    Melanocytic nevi of trunk    Melanocytic nevi of unspecified part of face    Melanocytic nevi of unspecified upper limb, including shoulder    Neoplasm of uncertain behavior of skin    Scar condition and fibrosis of skin    Infected cyst of skin    Onychomycosis    Wound, open, toe       Past Dermatologic / Past Relevant Medical History:    - history of [facility] concerning for melanoma in situ on right flank diagnosed by Dr. Castillo on 4/2/25 s/p wide local excision with 5-mm margins by Dr. Rob on 4/30/25    - AKs    - mildly dysplastic compound nevus on right lower back diagnosed by Dr. Castillo on 1/29/20  - mildly dysplastic compound nevus on left anterior upper arm diagnosed by Dr. Castillo on 8/18/21  - mildly dysplastic compound nevus on left abdomen side upper diagnosed by Dr. 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    A full examination was performed including scalp, face, eyes, ears, nose, lips, neck, chest, axillae, abdomen, back, bilateral upper extremities, and bilateral lower extremities. All findings within normal limits unless otherwise noted below.    Assessment/Plan   Skin Exam  1. NEOPLASM OF UNCERTAIN BEHAVIOR OF SKIN (3)  Right Lower Flank  6 mm dark brown pigmented, asymmetric macule with an asymmetric pigment network and irregular borders     - Shave removal    Lesion length (cm):  0.6  Margin per side (cm):  0.2  Lesion diameter (cm):  1  Informed consent: discussed and consent obtained    Timeout: patient name, date of birth, surgical site, and procedure verified    Procedure prep:  Patient was prepped and draped  Anesthesia: the lesion was anesthetized in a standard fashion    Anesthetic:  1% lidocaine w/ epinephrine 1-100,000 local infiltration  Instrument used: flexible razor blade    Hemostasis achieved with: aluminum chloride    Outcome: patient tolerated procedure well    Post-procedure details: sterile dressing applied and wound care instructions given    Dressing type: bandage and petrolatum      - Staff Communication: Dermatology Local Anesthesia: 1 % Lidocaine / Epinephrine - Amount:0.5ml  Specimen 1 - Dermatopathology- DERM LAB  Differential Diagnosis: DN  Check Margins Yes/No?:    Comments:    Dermpath Lab: Routine Histopathology (formalin-fixed tissue)  Right Lateral Upper Back  7 mm dark brown  pigmented, asymmetric macule with an asymmetric pigment network and irregular borders     - Shave removal    Lesion length (cm):  0.7  Margin per side (cm):  0.2  Lesion diameter (cm):  1.1  Informed consent: discussed and consent obtained    Timeout: patient name, date of birth, surgical site, and procedure verified    Procedure prep:  Patient was prepped and draped  Anesthesia: the lesion was anesthetized in a standard fashion    Anesthetic:  1% lidocaine w/ epinephrine 1-100,000 local infiltration  Instrument used: flexible razor blade    Hemostasis achieved with: aluminum chloride    Outcome: patient tolerated procedure well    Post-procedure details: sterile dressing applied and wound care instructions given    Dressing type: bandage and petrolatum      - Staff Communication: Dermatology Local Anesthesia: 1 % Lidocaine / Epinephrine - Amount:0.5ml  Specimen 2 - Dermatopathology- DERM LAB  Differential Diagnosis: DN  Check Margins Yes/No?:    Comments:    Dermpath Lab: Routine Histopathology (formalin-fixed tissue)  Left Medial Lower Back  6 mm dark brown pigmented, asymmetric macule with an asymmetric pigment network and irregular borders     - Shave removal    Lesion length (cm):  0.6  Margin per side (cm):  0.2  Lesion diameter (cm):  1  Informed consent: discussed and consent obtained    Timeout: patient name, date of birth, surgical site, and procedure verified    Procedure prep:  Patient was prepped and draped  Anesthesia: the lesion was anesthetized in a standard fashion    Anesthetic:  1% lidocaine w/ epinephrine 1-100,000 local infiltration  Instrument used: flexible razor blade    Hemostasis achieved with: aluminum chloride    Outcome: patient tolerated procedure well    Post-procedure details: sterile dressing applied and wound care instructions given    Dressing type: bandage and petrolatum      - Staff Communication: Dermatology Local Anesthesia: 1 % Lidocaine / Epinephrine - Amount:0.5ml  Specimen  3 - Dermatopathology- DERM LAB  Differential Diagnosis: DN  Check Margins Yes/No?:    Comments:    Dermpath Lab: Routine Histopathology (formalin-fixed tissue)  2. ACTINIC KERATOSIS (6)  Scalp (6)  Scattered on the patient's scalp, there are 6 erythematous, gritty, scaly macules   Actinic Keratoses -scattered on scalp.  The pre-cancerous nature of these lesions and treatment options were discussed with the patient today.  At this time, I recommend treatment with liquid nitrogen cryotherapy.  The patient expressed understanding, is in agreement with this plan, and wishes to proceed with cryotherapy today.  - Destr of lesion - Scalp (6)  Complexity: simple    Destruction method: cryotherapy    Informed consent: discussed and consent obtained    Lesion destroyed using liquid nitrogen: Yes    Cryotherapy cycles:  1  Outcome: patient tolerated procedure well with no complications    Post-procedure details: wound care instructions given      3. MELANOCYTIC NEVUS OF TRUNK  Generalized  Scattered on the patient's face, neck, trunk, and extremities, there are multiple small, round- to oval-shaped, brown-pigmented and pink-colored, symmetric, uniform-appearing macules and dome-shaped papules  Clinically benign- to slightly atypical-appearing nevi - the clinically benign- to slightly atypical-appearing nature of the remainder of the patient's nevi was discussed with the patient today.  None of the patient's nevi, with the exception of the 3 noted above, meet threshold for biopsy today.  I emphasized the importance of performing monthly self-skin exams using the ABCDs of monitoring moles, which were reviewed with the patient today and an informational hand-out provided.  I also emphasized the importance of sun avoidance and sun protection with daily sunscreen use.  4. SEBORRHEIC KERATOSIS  Generalized  Scattered on the patient's face, neck, trunk, and extremities, there are multiple tan- to light brown-colored, hyperkeratotic,  stuck-on appearing papules of varying size and shape  Seborrheic Keratoses - the benign nature of these lesions was discussed with the patient today and reassurance provided.  No treatment is medically indicated for these lesions at this time.  5. LENTIGO  Photodistributed  Multiple tan- to light brown-colored, round- to oval-shaped, symmetric and uniform-appearing macules and small patches consistent with lentigines scattered in sun-exposed areas.  Solar Lentigines and photodamage.  The clinically benign-appearing nature of these lesions and their relation to chronic sun exposure were discussed with the patient today and reassurance provided.  None of these lesions meet threshold for biopsy today, and thus no treatment is medically indicated for these lesions at this time.  The signs and symptoms of skin cancer were reviewed and the patient was advised to practice sun protection and sun avoidance, use daily sunscreen, and perform regular self skin exams.  The patient was instructed to monitor these lesions for any changes, such as in size, shape, or color, or associated symptoms and to call our office to schedule a return visit for re-evaluation if any such changes or symptoms are noticed in the future.  The patient expressed understanding and is in agreement with this plan.  6. TINEA PEDIS OF LEFT FOOT  Left Foot - Anterior  On the patient's left foot, there is moist scaling with maceration and fissures in the lateral webspaces and erythematous, scaly, thin plaques in a moccasin-type distribution on the soles and heels.  Tinea Pedis -left foot.  The fungal nature of this condition and treatment options were discussed extensively with the patient today.  At this time, I recommend topical anti-fungal therapy with Ketoconazole 2% cream, which the patient was instructed to apply twice daily to the affected areas of the feet for the next 3-4 weeks; the patient may repeat treatment in a similar fashion as needed for  future flares.  The risks, benefits, and side effects of this medication were discussed.  The patient expressed understanding and is in agreement with this plan.  7. TINEA PEDIS OF LEFT FOOT      8. SEBORRHEIC DERMATITIS  Head - Anterior (Face)  On the patient's face, mainly in his beard area, there are pink, scaly patches with whitish-yellowish, greasy scale  Seborrheic Dermatitis -face, mainly beard area.  The potentially chronic and intermittently flaring nature of this condition and treatment options were discussed extensively with the patient today.  At this time, I recommend topical anti-fungal therapy with Ketoconazole 2% shampoo, which the patient was instructed to use 2-3 days per week, alternating with over-the-counter anti-dandruff shampoos, such as Head & Shoulders, Selsun Blue, and Neutrogena T-gel, every month.  The risks, benefits, and side effects of this medication were discussed.  The patient expressed understanding and is in agreement with this plan.  9. PERSONAL HISTORY OF MALIGNANT MELANOMA OF SKIN  Generalized  The sites of prior melanoma excision were examined.  There is no evidence of recurrent growth or pigmentation or recurrent disease, satellite papules, or nodules on exam today.  History of melanoma in situ, dysplastic nevi, and actinic keratoses and photodamage.  There is no evidence of local, regional, or distant recurrent disease or any new primary melanomas on exam today.  I emphasized the importance of continuing to perform monthly self-skin and lymph node exams using the ABCDs of monitoring moles, which were reviewed with the patient, as well as the importance of sun avoidance and sun protection with daily sunscreen use.  I will have the patient return to our office in 4 to 6 months, pending the above biopsy results, for routine melanoma follow-up and total body skin exam, and the patient was instructed to call our office should the patient notice any new, changing, symptomatic, or  otherwise concerning skin lesions before then.  The patient expressed understanding and is in agreement with this plan.  10. DIFFUSE PHOTODAMAGE OF SKIN  Photodistributed  Diffuse photodamage with actinic changes with telangiectasia and mottled pigmentation in sun-exposed areas.  Photodamage.  The signs and symptoms of skin cancer were reviewed and the patient was advised to practice sun protection and sun avoidance, use daily sunscreen, and perform regular self skin exams.  Sun protection was discussed, including avoiding the mid-day sun, wearing a sunscreen with SPF at least 50, and stressing the need for reapplication of sunscreen and applying more than they think they need.          [1]   Current Outpatient Medications:     acetaminophen (Tylenol) 325 mg tablet, Take 2 tablets (650 mg) by mouth every 4 hours if needed (pain)., Disp: , Rfl:     amLODIPine (Norvasc) 10 mg tablet, TAKE 1 TABLET DAILY, Disp: 90 tablet, Rfl: 1    atomoxetine (Strattera) 10 mg capsule, TAKE 1 CAPSULE ONCE DAILY. SWALLOW WHOLE; DO NOT OPEN. IF OPENED ACCIDENTALLY, DO NOT TOUCH EYES; WASH HANDS IMMEDIATELY (PRODUCT IS AN EYEIRRITANT), Disp: 90 capsule, Rfl: 0    cholecalciferol (Vitamin D-3) 25 MCG (1000 UT) tablet, Take 1 tablet (25 mcg) by mouth once daily., Disp: , Rfl:     cyanocobalamin (Vitamin B-12) 1,000 mcg/mL injection, Inject 2 mL (2,000 mcg) into the muscle. per AQ., Disp: , Rfl:     ergocalciferol (Vitamin D-2) 1250 mcg (50,000 units) capsule, TAKE 1 CAPSULE BY MOUTH ONE TIME PER WEEK, Disp: 4 capsule, Rfl: 5    fenofibrate (Tricor) 48 mg tablet, Take 1 tablet (48 mg) by mouth once daily. as directed, Disp: 90 tablet, Rfl: 3    insulin aspart (NovoLOG Flexpen U-100 Insulin) 100 unit/mL (3 mL) pen, Inject 12 units before each meal + sliding scale on Mon-Thur; Inject 20 units before each meal plus sliding scale on Fri-sun. Maximum daily dose 80 units, Disp: 75 mL, Rfl: 3    insulin glargine (Toujeo SoloStar U-300 Insulin) 300  "unit/mL (1.5 mL) pen, INJECT 53 UNITS SUBCUTANEOUSLY AT BEDTIME, Disp: 18 mL, Rfl: 3    Jardiance 25 mg tablet, Take 1 tablet (25 mg) by mouth once daily., Disp: 90 tablet, Rfl: 3    losartan (Cozaar) 100 mg tablet, Take 1 tablet (100 mg) by mouth once daily., Disp: 90 tablet, Rfl: 3    pen needle, diabetic (BD Ultra-Fine Mini Pen Needle) 31 gauge x 3/16\" needle, USE TO INJECT INSULIN 4    TIMES A DAY (Patient taking differently: USE TO INJECT INSULIN 4    TIMES A DAY), Disp: 400 each, Rfl: 1    pravastatin (Pravachol) 20 mg tablet, Take 1 tablet (20 mg) by mouth once daily at bedtime., Disp: 90 tablet, Rfl: 3    testosterone 20.25 mg/1.25 gram (1.62 %) gel in metered-dose pump, Place 4 Pump (2 pumps to each shoulder) on the skin once daily., Disp: 150 g, Rfl: 2    testosterone 20.25 mg/1.25 gram (1.62 %) gel in metered-dose pump, Place 4 Pump on the skin once daily. 2 pumps to each shoulder, Disp: 150 g, Rfl: 1    testosterone 20.25 mg/1.25 gram (1.62 %) gel in metered-dose pump, Place 2 pumps on the skin once daily. 1 pumps to each shoulder, Disp: 150 g, Rfl: 3    tirzepatide (Mounjaro) 15 mg/0.5 mL pen injector, Inject 15 mg under the skin 1 (one) time per week., Disp: 6 mL, Rfl: 3    sildenafil (Viagra) 100 mg tablet, Take 1 tablet (100 mg) by mouth if needed for erectile dysfunction (Start with half tab. Take 1 hour prior to intercourse on an empty stomach. If you have an erection for over 4 hours, please go to the emergency room.)., Disp: 30 tablet, Rfl: 6    Current Facility-Administered Medications:     cyanocobalamin (Vitamin B-12) injection 1,000 mcg, 1,000 mcg, intramuscular, Once, Piper Alvarado MD    " Alert-The patient is alert, awake and responds to voice. The patient is oriented to time, place, and person. The triage nurse is able to obtain subjective information.

## 2025-07-22 NOTE — Clinical Note
On the patient's left foot, there is moist scaling with maceration and fissures in the lateral webspaces and erythematous, scaly, thin plaques in a moccasin-type distribution on the soles and heels.

## 2025-07-22 NOTE — Clinical Note
Seborrheic Dermatitis -face, mainly beard area.  The potentially chronic and intermittently flaring nature of this condition and treatment options were discussed extensively with the patient today.  At this time, I recommend topical anti-fungal therapy with Ketoconazole 2% shampoo, which the patient was instructed to use 2-3 days per week, alternating with over-the-counter anti-dandruff shampoos, such as Head & Shoulders, Selsun Blue, and Neutrogena T-gel, every month.  The risks, benefits, and side effects of this medication were discussed.  The patient expressed understanding and is in agreement with this plan.

## 2025-07-22 NOTE — Clinical Note
History of melanoma in situ, dysplastic nevi, and actinic keratoses and photodamage.  There is no evidence of local, regional, or distant recurrent disease or any new primary melanomas on exam today.  I emphasized the importance of continuing to perform monthly self-skin and lymph node exams using the ABCDs of monitoring moles, which were reviewed with the patient, as well as the importance of sun avoidance and sun protection with daily sunscreen use.  I will have the patient return to our office in 4 to 6 months, pending the above biopsy results, for routine melanoma follow-up and total body skin exam, and the patient was instructed to call our office should the patient notice any new, changing, symptomatic, or otherwise concerning skin lesions before then.  The patient expressed understanding and is in agreement with this plan.

## 2025-07-22 NOTE — Clinical Note
Actinic Keratoses -scattered on scalp.  The pre-cancerous nature of these lesions and treatment options were discussed with the patient today.  At this time, I recommend treatment with liquid nitrogen cryotherapy.  The patient expressed understanding, is in agreement with this plan, and wishes to proceed with cryotherapy today.

## 2025-07-24 PROCEDURE — RXMED WILLOW AMBULATORY MEDICATION CHARGE

## 2025-07-24 RX ORDER — TESTOSTERONE 20.25 MG/1.25G
4 GEL TOPICAL DAILY
Qty: 150 G | Refills: 1 | Status: SHIPPED | OUTPATIENT
Start: 2025-07-24

## 2025-07-25 ENCOUNTER — PHARMACY VISIT (OUTPATIENT)
Dept: PHARMACY | Facility: CLINIC | Age: 60
End: 2025-07-25
Payer: MEDICARE

## 2025-08-05 ENCOUNTER — TELEPHONE (OUTPATIENT)
Dept: DERMATOLOGY | Facility: CLINIC | Age: 60
End: 2025-08-05
Payer: COMMERCIAL

## 2025-08-07 DIAGNOSIS — E55.9 VITAMIN D DEFICIENCY: ICD-10-CM

## 2025-08-07 PROCEDURE — RXMED WILLOW AMBULATORY MEDICATION CHARGE

## 2025-08-07 RX ORDER — ERGOCALCIFEROL 1.25 MG/1
1.25 CAPSULE ORAL
Qty: 4 CAPSULE | Refills: 5 | Status: SHIPPED | OUTPATIENT
Start: 2025-08-07

## 2025-08-08 ENCOUNTER — PHARMACY VISIT (OUTPATIENT)
Dept: PHARMACY | Facility: CLINIC | Age: 60
End: 2025-08-08
Payer: MEDICARE

## 2025-08-11 ENCOUNTER — APPOINTMENT (OUTPATIENT)
Dept: UROLOGY | Facility: HOSPITAL | Age: 60
End: 2025-08-11
Payer: COMMERCIAL

## 2025-08-12 ENCOUNTER — APPOINTMENT (OUTPATIENT)
Dept: PRIMARY CARE | Facility: CLINIC | Age: 60
End: 2025-08-12
Payer: COMMERCIAL

## 2025-08-12 ENCOUNTER — OFFICE VISIT (OUTPATIENT)
Dept: PRIMARY CARE | Facility: CLINIC | Age: 60
End: 2025-08-12
Payer: COMMERCIAL

## 2025-08-12 VITALS
OXYGEN SATURATION: 98 % | DIASTOLIC BLOOD PRESSURE: 70 MMHG | RESPIRATION RATE: 16 BRPM | SYSTOLIC BLOOD PRESSURE: 128 MMHG | BODY MASS INDEX: 31.16 KG/M2 | HEART RATE: 78 BPM | WEIGHT: 298 LBS

## 2025-08-12 DIAGNOSIS — E53.8 B12 DEFICIENCY: ICD-10-CM

## 2025-08-12 DIAGNOSIS — I10 BENIGN ESSENTIAL HYPERTENSION: ICD-10-CM

## 2025-08-12 DIAGNOSIS — E78.00 PURE HYPERCHOLESTEROLEMIA: ICD-10-CM

## 2025-08-12 DIAGNOSIS — G47.33 OSA (OBSTRUCTIVE SLEEP APNEA): ICD-10-CM

## 2025-08-12 DIAGNOSIS — D51.0 ANEMIA, PERNICIOUS: Primary | ICD-10-CM

## 2025-08-12 DIAGNOSIS — E29.1 HYPOGONADISM IN MALE: ICD-10-CM

## 2025-08-12 DIAGNOSIS — Z12.5 PROSTATE CANCER SCREENING: ICD-10-CM

## 2025-08-12 PROCEDURE — 99214 OFFICE O/P EST MOD 30 MIN: CPT | Performed by: INTERNAL MEDICINE

## 2025-08-12 PROCEDURE — 3074F SYST BP LT 130 MM HG: CPT | Performed by: INTERNAL MEDICINE

## 2025-08-12 PROCEDURE — 3078F DIAST BP <80 MM HG: CPT | Performed by: INTERNAL MEDICINE

## 2025-08-12 PROCEDURE — 3044F HG A1C LEVEL LT 7.0%: CPT | Performed by: INTERNAL MEDICINE

## 2025-08-12 PROCEDURE — 4010F ACE/ARB THERAPY RXD/TAKEN: CPT | Performed by: INTERNAL MEDICINE

## 2025-08-12 PROCEDURE — 96372 THER/PROPH/DIAG INJ SC/IM: CPT | Performed by: INTERNAL MEDICINE

## 2025-08-12 RX ORDER — CYANOCOBALAMIN 1000 UG/ML
1000 INJECTION, SOLUTION INTRAMUSCULAR; SUBCUTANEOUS ONCE
Status: COMPLETED | OUTPATIENT
Start: 2025-08-12 | End: 2025-08-12

## 2025-08-12 RX ORDER — MELOXICAM 7.5 MG/1
1 TABLET ORAL
COMMUNITY
Start: 2025-06-21

## 2025-08-12 RX ADMIN — CYANOCOBALAMIN 1000 MCG: 1000 INJECTION, SOLUTION INTRAMUSCULAR; SUBCUTANEOUS at 10:17

## 2025-08-22 PROCEDURE — RXMED WILLOW AMBULATORY MEDICATION CHARGE

## 2025-08-25 ENCOUNTER — PHARMACY VISIT (OUTPATIENT)
Dept: PHARMACY | Facility: CLINIC | Age: 60
End: 2025-08-25
Payer: MEDICARE

## 2025-08-30 PROCEDURE — RXMED WILLOW AMBULATORY MEDICATION CHARGE

## 2025-09-04 ENCOUNTER — PHARMACY VISIT (OUTPATIENT)
Dept: PHARMACY | Facility: CLINIC | Age: 60
End: 2025-09-04
Payer: MEDICARE

## 2025-09-08 ENCOUNTER — APPOINTMENT (OUTPATIENT)
Dept: DERMATOLOGY | Facility: CLINIC | Age: 60
End: 2025-09-08
Payer: COMMERCIAL

## 2025-09-12 ENCOUNTER — APPOINTMENT (OUTPATIENT)
Dept: PRIMARY CARE | Facility: CLINIC | Age: 60
End: 2025-09-12
Payer: COMMERCIAL

## 2025-09-15 ENCOUNTER — APPOINTMENT (OUTPATIENT)
Dept: UROLOGY | Facility: HOSPITAL | Age: 60
End: 2025-09-15
Payer: COMMERCIAL

## 2025-10-21 ENCOUNTER — APPOINTMENT (OUTPATIENT)
Dept: DERMATOLOGY | Facility: CLINIC | Age: 60
End: 2025-10-21
Payer: COMMERCIAL

## 2025-12-09 ENCOUNTER — APPOINTMENT (OUTPATIENT)
Dept: PRIMARY CARE | Facility: CLINIC | Age: 60
End: 2025-12-09
Payer: COMMERCIAL

## 2025-12-22 ENCOUNTER — APPOINTMENT (OUTPATIENT)
Dept: DERMATOLOGY | Facility: CLINIC | Age: 60
End: 2025-12-22
Payer: COMMERCIAL

## 2026-01-20 ENCOUNTER — APPOINTMENT (OUTPATIENT)
Dept: DERMATOLOGY | Facility: CLINIC | Age: 61
End: 2026-01-20
Payer: COMMERCIAL

## 2026-04-21 ENCOUNTER — APPOINTMENT (OUTPATIENT)
Dept: DERMATOLOGY | Facility: CLINIC | Age: 61
End: 2026-04-21
Payer: COMMERCIAL